# Patient Record
Sex: MALE | Race: WHITE | NOT HISPANIC OR LATINO | Employment: OTHER | URBAN - METROPOLITAN AREA
[De-identification: names, ages, dates, MRNs, and addresses within clinical notes are randomized per-mention and may not be internally consistent; named-entity substitution may affect disease eponyms.]

---

## 2020-10-26 ENCOUNTER — TRANSCRIBE ORDERS (OUTPATIENT)
Dept: ADMINISTRATIVE | Facility: HOSPITAL | Age: 75
End: 2020-10-26

## 2020-10-26 DIAGNOSIS — C44.229 SQUAMOUS CELL CARCINOMA OF SKIN OF LEFT EAR: Primary | ICD-10-CM

## 2020-11-09 ENCOUNTER — HOSPITAL ENCOUNTER (OUTPATIENT)
Dept: PET IMAGING | Facility: HOSPITAL | Age: 75
Discharge: HOME | End: 2020-11-09

## 2020-11-09 DIAGNOSIS — C44.229 SQUAMOUS CELL CARCINOMA OF SKIN OF LEFT EAR: ICD-10-CM

## 2020-11-09 LAB — GLUCOSE BLDC GLUCOMTR-MCNC: 90 MG/DL (ref 70–130)

## 2020-11-09 PROCEDURE — 0 FLUDEOXYGLUCOSE F18 SOLUTION: Performed by: OTOLARYNGOLOGY

## 2020-11-09 PROCEDURE — 78815 PET IMAGE W/CT SKULL-THIGH: CPT

## 2020-11-09 PROCEDURE — 82962 GLUCOSE BLOOD TEST: CPT

## 2020-11-09 PROCEDURE — A9552 F18 FDG: HCPCS | Performed by: OTOLARYNGOLOGY

## 2020-11-09 RX ADMIN — FLUDEOXYGLUCOSE F18 1 DOSE: 300 INJECTION INTRAVENOUS at 14:01

## 2020-11-16 ENCOUNTER — APPOINTMENT (OUTPATIENT)
Dept: PREADMISSION TESTING | Facility: HOSPITAL | Age: 75
End: 2020-11-16

## 2020-11-16 VITALS — WEIGHT: 205.03 LBS | HEIGHT: 67 IN | BODY MASS INDEX: 32.18 KG/M2

## 2020-11-16 LAB
ALBUMIN SERPL-MCNC: 4.3 G/DL (ref 3.5–5.2)
ALBUMIN/GLOB SERPL: 2.3 G/DL
ALP SERPL-CCNC: 68 U/L (ref 39–117)
ALT SERPL W P-5'-P-CCNC: 18 U/L (ref 1–41)
ANION GAP SERPL CALCULATED.3IONS-SCNC: 8 MMOL/L (ref 5–15)
AST SERPL-CCNC: 25 U/L (ref 1–40)
BILIRUB SERPL-MCNC: 0.2 MG/DL (ref 0–1.2)
BUN SERPL-MCNC: 20 MG/DL (ref 8–23)
BUN/CREAT SERPL: 19.4 (ref 7–25)
CALCIUM SPEC-SCNC: 9.5 MG/DL (ref 8.6–10.5)
CHLORIDE SERPL-SCNC: 106 MMOL/L (ref 98–107)
CO2 SERPL-SCNC: 27 MMOL/L (ref 22–29)
CREAT SERPL-MCNC: 1.03 MG/DL (ref 0.76–1.27)
DEPRECATED RDW RBC AUTO: 43.2 FL (ref 37–54)
ERYTHROCYTE [DISTWIDTH] IN BLOOD BY AUTOMATED COUNT: 12.7 % (ref 12.3–15.4)
GFR SERPL CREATININE-BSD FRML MDRD: 70 ML/MIN/1.73
GLOBULIN UR ELPH-MCNC: 1.9 GM/DL
GLUCOSE SERPL-MCNC: 93 MG/DL (ref 65–99)
HCT VFR BLD AUTO: 37.9 % (ref 37.5–51)
HGB BLD-MCNC: 12.6 G/DL (ref 13–17.7)
MCH RBC QN AUTO: 30.7 PG (ref 26.6–33)
MCHC RBC AUTO-ENTMCNC: 33.2 G/DL (ref 31.5–35.7)
MCV RBC AUTO: 92.4 FL (ref 79–97)
PLATELET # BLD AUTO: 179 10*3/MM3 (ref 140–450)
PMV BLD AUTO: 9.1 FL (ref 6–12)
POTASSIUM SERPL-SCNC: 4 MMOL/L (ref 3.5–5.2)
PROT SERPL-MCNC: 6.2 G/DL (ref 6–8.5)
QT INTERVAL: 400 MS
QTC INTERVAL: 409 MS
RBC # BLD AUTO: 4.1 10*6/MM3 (ref 4.14–5.8)
SODIUM SERPL-SCNC: 141 MMOL/L (ref 136–145)
WBC # BLD AUTO: 6.71 10*3/MM3 (ref 3.4–10.8)

## 2020-11-16 PROCEDURE — 85027 COMPLETE CBC AUTOMATED: CPT

## 2020-11-16 PROCEDURE — 36415 COLL VENOUS BLD VENIPUNCTURE: CPT

## 2020-11-16 PROCEDURE — 80053 COMPREHEN METABOLIC PANEL: CPT

## 2020-11-16 PROCEDURE — U0004 COV-19 TEST NON-CDC HGH THRU: HCPCS

## 2020-11-16 PROCEDURE — 93005 ELECTROCARDIOGRAM TRACING: CPT

## 2020-11-16 PROCEDURE — C9803 HOPD COVID-19 SPEC COLLECT: HCPCS

## 2020-11-16 PROCEDURE — 93010 ELECTROCARDIOGRAM REPORT: CPT | Performed by: INTERNAL MEDICINE

## 2020-11-16 RX ORDER — LISINOPRIL 10 MG/1
10 TABLET ORAL DAILY
COMMUNITY

## 2020-11-16 RX ORDER — MULTIPLE VITAMINS W/ MINERALS TAB 9MG-400MCG
1 TAB ORAL DAILY
COMMUNITY

## 2020-11-16 RX ORDER — SIMVASTATIN 40 MG
40 TABLET ORAL NIGHTLY
COMMUNITY

## 2020-11-16 NOTE — PAT

## 2020-11-17 LAB — SARS-COV-2 RNA RESP QL NAA+PROBE: NOT DETECTED

## 2020-11-18 ENCOUNTER — HOSPITAL ENCOUNTER (INPATIENT)
Facility: HOSPITAL | Age: 75
LOS: 2 days | Discharge: HOME | End: 2020-11-20
Attending: OTOLARYNGOLOGY | Admitting: OTOLARYNGOLOGY

## 2020-11-18 ENCOUNTER — ANESTHESIA EVENT (OUTPATIENT)
Dept: PERIOP | Facility: HOSPITAL | Age: 75
End: 2020-11-18

## 2020-11-18 ENCOUNTER — ANESTHESIA (OUTPATIENT)
Dept: PERIOP | Facility: HOSPITAL | Age: 75
End: 2020-11-18

## 2020-11-18 DIAGNOSIS — IMO0002 METASTATIC SQUAMOUS CELL CARCINOMA: ICD-10-CM

## 2020-11-18 PROBLEM — R22.1 NECK MASS: Status: ACTIVE | Noted: 2020-11-18

## 2020-11-18 PROCEDURE — 25010000002 SUCCINYLCHOLINE PER 20 MG: Performed by: NURSE ANESTHETIST, CERTIFIED REGISTERED

## 2020-11-18 PROCEDURE — 25010000002 FENTANYL CITRATE (PF) 100 MCG/2ML SOLUTION: Performed by: NURSE ANESTHETIST, CERTIFIED REGISTERED

## 2020-11-18 PROCEDURE — 00BR0ZZ EXCISION OF ACCESSORY NERVE, OPEN APPROACH: ICD-10-PCS | Performed by: OTOLARYNGOLOGY

## 2020-11-18 PROCEDURE — 25010000002 HYDROMORPHONE PER 4 MG: Performed by: OTOLARYNGOLOGY

## 2020-11-18 PROCEDURE — 25010000002 ONDANSETRON PER 1 MG: Performed by: NURSE ANESTHETIST, CERTIFIED REGISTERED

## 2020-11-18 PROCEDURE — 0KB30ZZ EXCISION OF LEFT NECK MUSCLE, OPEN APPROACH: ICD-10-PCS | Performed by: OTOLARYNGOLOGY

## 2020-11-18 PROCEDURE — 25010000002 PROPOFOL 10 MG/ML EMULSION: Performed by: NURSE ANESTHETIST, CERTIFIED REGISTERED

## 2020-11-18 PROCEDURE — 25010000002 DEXAMETHASONE PER 1 MG: Performed by: NURSE ANESTHETIST, CERTIFIED REGISTERED

## 2020-11-18 PROCEDURE — 0CT90ZZ RESECTION OF LEFT PAROTID GLAND, OPEN APPROACH: ICD-10-PCS | Performed by: OTOLARYNGOLOGY

## 2020-11-18 PROCEDURE — 0HB1XZX EXCISION OF FACE SKIN, EXTERNAL APPROACH, DIAGNOSTIC: ICD-10-PCS | Performed by: OTOLARYNGOLOGY

## 2020-11-18 PROCEDURE — 25010000002 PHENYLEPHRINE PER 1 ML: Performed by: NURSE ANESTHETIST, CERTIFIED REGISTERED

## 2020-11-18 PROCEDURE — 88305 TISSUE EXAM BY PATHOLOGIST: CPT | Performed by: OTOLARYNGOLOGY

## 2020-11-18 PROCEDURE — 07B20ZZ EXCISION OF LEFT NECK LYMPHATIC, OPEN APPROACH: ICD-10-PCS | Performed by: OTOLARYNGOLOGY

## 2020-11-18 PROCEDURE — 88307 TISSUE EXAM BY PATHOLOGIST: CPT | Performed by: OTOLARYNGOLOGY

## 2020-11-18 DEVICE — LIGACLIP MCA MULTIPLE CLIP APPLIERS, 20 SMALL CLIPS
Type: IMPLANTABLE DEVICE | Status: FUNCTIONAL
Brand: LIGACLIP

## 2020-11-18 RX ORDER — LIDOCAINE HYDROCHLORIDE 10 MG/ML
0.5 INJECTION, SOLUTION EPIDURAL; INFILTRATION; INTRACAUDAL; PERINEURAL ONCE AS NEEDED
Status: COMPLETED | OUTPATIENT
Start: 2020-11-18 | End: 2020-11-18

## 2020-11-18 RX ORDER — DEXAMETHASONE SODIUM PHOSPHATE 4 MG/ML
INJECTION, SOLUTION INTRA-ARTICULAR; INTRALESIONAL; INTRAMUSCULAR; INTRAVENOUS; SOFT TISSUE AS NEEDED
Status: DISCONTINUED | OUTPATIENT
Start: 2020-11-18 | End: 2020-11-18 | Stop reason: SURG

## 2020-11-18 RX ORDER — SODIUM CHLORIDE 0.9 % (FLUSH) 0.9 %
10 SYRINGE (ML) INJECTION AS NEEDED
Status: DISCONTINUED | OUTPATIENT
Start: 2020-11-18 | End: 2020-11-18 | Stop reason: HOSPADM

## 2020-11-18 RX ORDER — SODIUM CHLORIDE, SODIUM LACTATE, POTASSIUM CHLORIDE, CALCIUM CHLORIDE 600; 310; 30; 20 MG/100ML; MG/100ML; MG/100ML; MG/100ML
9 INJECTION, SOLUTION INTRAVENOUS CONTINUOUS
Status: DISCONTINUED | OUTPATIENT
Start: 2020-11-18 | End: 2020-11-18

## 2020-11-18 RX ORDER — FAMOTIDINE 10 MG/ML
20 INJECTION, SOLUTION INTRAVENOUS ONCE
Status: CANCELLED | OUTPATIENT
Start: 2020-11-18 | End: 2020-11-18

## 2020-11-18 RX ORDER — MAGNESIUM HYDROXIDE 1200 MG/15ML
LIQUID ORAL AS NEEDED
Status: DISCONTINUED | OUTPATIENT
Start: 2020-11-18 | End: 2020-11-18 | Stop reason: HOSPADM

## 2020-11-18 RX ORDER — HYDROMORPHONE HYDROCHLORIDE 1 MG/ML
0.5 INJECTION, SOLUTION INTRAMUSCULAR; INTRAVENOUS; SUBCUTANEOUS
Status: DISCONTINUED | OUTPATIENT
Start: 2020-11-18 | End: 2020-11-18 | Stop reason: HOSPADM

## 2020-11-18 RX ORDER — ONDANSETRON 2 MG/ML
4 INJECTION INTRAMUSCULAR; INTRAVENOUS ONCE AS NEEDED
Status: DISCONTINUED | OUTPATIENT
Start: 2020-11-18 | End: 2020-11-18 | Stop reason: HOSPADM

## 2020-11-18 RX ORDER — LIDOCAINE HYDROCHLORIDE 10 MG/ML
INJECTION, SOLUTION EPIDURAL; INFILTRATION; INTRACAUDAL; PERINEURAL AS NEEDED
Status: DISCONTINUED | OUTPATIENT
Start: 2020-11-18 | End: 2020-11-18 | Stop reason: SURG

## 2020-11-18 RX ORDER — SODIUM CHLORIDE 0.9 % (FLUSH) 0.9 %
10 SYRINGE (ML) INJECTION AS NEEDED
Status: DISCONTINUED | OUTPATIENT
Start: 2020-11-18 | End: 2020-11-20 | Stop reason: HOSPADM

## 2020-11-18 RX ORDER — CLINDAMYCIN PHOSPHATE 900 MG/50ML
900 INJECTION INTRAVENOUS ONCE
Status: COMPLETED | OUTPATIENT
Start: 2020-11-18 | End: 2020-11-18

## 2020-11-18 RX ORDER — DIAPER,BRIEF,INFANT-TODD,DISP
EACH MISCELLANEOUS 3 TIMES DAILY
Status: DISCONTINUED | OUTPATIENT
Start: 2020-11-18 | End: 2020-11-20 | Stop reason: HOSPADM

## 2020-11-18 RX ORDER — IBUPROFEN 400 MG/1
400 TABLET ORAL EVERY 4 HOURS PRN
Status: DISCONTINUED | OUTPATIENT
Start: 2020-11-18 | End: 2020-11-20 | Stop reason: HOSPADM

## 2020-11-18 RX ORDER — HYDROMORPHONE HYDROCHLORIDE 1 MG/ML
0.5 INJECTION, SOLUTION INTRAMUSCULAR; INTRAVENOUS; SUBCUTANEOUS
Status: DISCONTINUED | OUTPATIENT
Start: 2020-11-18 | End: 2020-11-20 | Stop reason: HOSPADM

## 2020-11-18 RX ORDER — FAMOTIDINE 20 MG/1
20 TABLET, FILM COATED ORAL ONCE
Status: COMPLETED | OUTPATIENT
Start: 2020-11-18 | End: 2020-11-18

## 2020-11-18 RX ORDER — HYDRALAZINE HYDROCHLORIDE 20 MG/ML
5 INJECTION INTRAMUSCULAR; INTRAVENOUS
Status: DISCONTINUED | OUTPATIENT
Start: 2020-11-18 | End: 2020-11-18 | Stop reason: HOSPADM

## 2020-11-18 RX ORDER — HYDROCODONE BITARTRATE AND ACETAMINOPHEN 7.5; 325 MG/1; MG/1
1 TABLET ORAL EVERY 4 HOURS PRN
Status: DISCONTINUED | OUTPATIENT
Start: 2020-11-18 | End: 2020-11-20 | Stop reason: HOSPADM

## 2020-11-18 RX ORDER — SODIUM CHLORIDE 0.9 % (FLUSH) 0.9 %
3 SYRINGE (ML) INJECTION EVERY 12 HOURS SCHEDULED
Status: DISCONTINUED | OUTPATIENT
Start: 2020-11-18 | End: 2020-11-20 | Stop reason: HOSPADM

## 2020-11-18 RX ORDER — ONDANSETRON 2 MG/ML
4 INJECTION INTRAMUSCULAR; INTRAVENOUS ONCE AS NEEDED
Status: DISCONTINUED | OUTPATIENT
Start: 2020-11-18 | End: 2020-11-20 | Stop reason: HOSPADM

## 2020-11-18 RX ORDER — FENTANYL CITRATE 50 UG/ML
50 INJECTION, SOLUTION INTRAMUSCULAR; INTRAVENOUS
Status: DISCONTINUED | OUTPATIENT
Start: 2020-11-18 | End: 2020-11-18 | Stop reason: HOSPADM

## 2020-11-18 RX ORDER — NALOXONE HCL 0.4 MG/ML
0.4 VIAL (ML) INJECTION
Status: DISCONTINUED | OUTPATIENT
Start: 2020-11-18 | End: 2020-11-20 | Stop reason: HOSPADM

## 2020-11-18 RX ORDER — HYDROCODONE BITARTRATE AND ACETAMINOPHEN 10; 325 MG/1; MG/1
1 TABLET ORAL EVERY 4 HOURS PRN
Status: DISCONTINUED | OUTPATIENT
Start: 2020-11-18 | End: 2020-11-20 | Stop reason: HOSPADM

## 2020-11-18 RX ORDER — NALOXONE HCL 0.4 MG/ML
0.1 VIAL (ML) INJECTION
Status: DISCONTINUED | OUTPATIENT
Start: 2020-11-18 | End: 2020-11-20 | Stop reason: HOSPADM

## 2020-11-18 RX ORDER — LABETALOL HYDROCHLORIDE 5 MG/ML
5 INJECTION, SOLUTION INTRAVENOUS
Status: DISCONTINUED | OUTPATIENT
Start: 2020-11-18 | End: 2020-11-18 | Stop reason: HOSPADM

## 2020-11-18 RX ORDER — SUCCINYLCHOLINE CHLORIDE 20 MG/ML
INJECTION INTRAMUSCULAR; INTRAVENOUS AS NEEDED
Status: DISCONTINUED | OUTPATIENT
Start: 2020-11-18 | End: 2020-11-18 | Stop reason: SURG

## 2020-11-18 RX ORDER — ROCURONIUM BROMIDE 10 MG/ML
INJECTION, SOLUTION INTRAVENOUS AS NEEDED
Status: DISCONTINUED | OUTPATIENT
Start: 2020-11-18 | End: 2020-11-18 | Stop reason: SURG

## 2020-11-18 RX ORDER — IPRATROPIUM BROMIDE AND ALBUTEROL SULFATE 2.5; .5 MG/3ML; MG/3ML
3 SOLUTION RESPIRATORY (INHALATION) ONCE AS NEEDED
Status: DISCONTINUED | OUTPATIENT
Start: 2020-11-18 | End: 2020-11-18 | Stop reason: HOSPADM

## 2020-11-18 RX ORDER — SODIUM CHLORIDE 0.9 % (FLUSH) 0.9 %
10 SYRINGE (ML) INJECTION EVERY 12 HOURS SCHEDULED
Status: DISCONTINUED | OUTPATIENT
Start: 2020-11-18 | End: 2020-11-18 | Stop reason: HOSPADM

## 2020-11-18 RX ORDER — FENTANYL CITRATE 50 UG/ML
INJECTION, SOLUTION INTRAMUSCULAR; INTRAVENOUS AS NEEDED
Status: DISCONTINUED | OUTPATIENT
Start: 2020-11-18 | End: 2020-11-18 | Stop reason: SURG

## 2020-11-18 RX ORDER — PROPOFOL 10 MG/ML
VIAL (ML) INTRAVENOUS AS NEEDED
Status: DISCONTINUED | OUTPATIENT
Start: 2020-11-18 | End: 2020-11-18 | Stop reason: SURG

## 2020-11-18 RX ORDER — DIPHENHYDRAMINE HCL 25 MG
25 CAPSULE ORAL EVERY 6 HOURS PRN
Status: DISCONTINUED | OUTPATIENT
Start: 2020-11-18 | End: 2020-11-20 | Stop reason: HOSPADM

## 2020-11-18 RX ORDER — ASPIRIN 81 MG/1
81 TABLET ORAL DAILY
COMMUNITY

## 2020-11-18 RX ORDER — MORPHINE SULFATE 4 MG/ML
4 INJECTION, SOLUTION INTRAMUSCULAR; INTRAVENOUS
Status: DISCONTINUED | OUTPATIENT
Start: 2020-11-18 | End: 2020-11-20 | Stop reason: HOSPADM

## 2020-11-18 RX ORDER — LIDOCAINE HYDROCHLORIDE AND EPINEPHRINE 10; 10 MG/ML; UG/ML
INJECTION, SOLUTION INFILTRATION; PERINEURAL AS NEEDED
Status: DISCONTINUED | OUTPATIENT
Start: 2020-11-18 | End: 2020-11-18 | Stop reason: HOSPADM

## 2020-11-18 RX ORDER — SODIUM CHLORIDE, SODIUM LACTATE, POTASSIUM CHLORIDE, CALCIUM CHLORIDE 600; 310; 30; 20 MG/100ML; MG/100ML; MG/100ML; MG/100ML
100 INJECTION, SOLUTION INTRAVENOUS CONTINUOUS
Status: DISCONTINUED | OUTPATIENT
Start: 2020-11-18 | End: 2020-11-19

## 2020-11-18 RX ORDER — ONDANSETRON 2 MG/ML
INJECTION INTRAMUSCULAR; INTRAVENOUS AS NEEDED
Status: DISCONTINUED | OUTPATIENT
Start: 2020-11-18 | End: 2020-11-18 | Stop reason: SURG

## 2020-11-18 RX ADMIN — EPHEDRINE SULFATE 10 MG: 50 INJECTION INTRAMUSCULAR; INTRAVENOUS; SUBCUTANEOUS at 12:36

## 2020-11-18 RX ADMIN — FENTANYL CITRATE 50 MCG: 0.05 INJECTION, SOLUTION INTRAMUSCULAR; INTRAVENOUS at 19:17

## 2020-11-18 RX ADMIN — BACITRACIN: 500 OINTMENT TOPICAL at 20:44

## 2020-11-18 RX ADMIN — CLINDAMYCIN PHOSPHATE 900 MG: 18 INJECTION, SOLUTION INTRAVENOUS at 17:45

## 2020-11-18 RX ADMIN — FENTANYL CITRATE 50 MCG: 50 INJECTION, SOLUTION INTRAMUSCULAR; INTRAVENOUS at 13:20

## 2020-11-18 RX ADMIN — EPHEDRINE SULFATE 5 MG: 50 INJECTION INTRAMUSCULAR; INTRAVENOUS; SUBCUTANEOUS at 12:38

## 2020-11-18 RX ADMIN — SODIUM CHLORIDE, PRESERVATIVE FREE 3 ML: 5 INJECTION INTRAVENOUS at 20:34

## 2020-11-18 RX ADMIN — LIDOCAINE HYDROCHLORIDE 0.2 ML: 10 INJECTION, SOLUTION EPIDURAL; INFILTRATION; INTRACAUDAL; PERINEURAL at 10:24

## 2020-11-18 RX ADMIN — ROCURONIUM BROMIDE 10 MG: 10 INJECTION INTRAVENOUS at 12:26

## 2020-11-18 RX ADMIN — EPHEDRINE SULFATE 10 MG: 50 INJECTION INTRAMUSCULAR; INTRAVENOUS; SUBCUTANEOUS at 12:34

## 2020-11-18 RX ADMIN — LIDOCAINE HYDROCHLORIDE 50 MG: 10 INJECTION, SOLUTION EPIDURAL; INFILTRATION; INTRACAUDAL; PERINEURAL at 12:26

## 2020-11-18 RX ADMIN — PROPOFOL 200 MG: 10 INJECTION, EMULSION INTRAVENOUS at 12:26

## 2020-11-18 RX ADMIN — DEXAMETHASONE SODIUM PHOSPHATE 8 MG: 4 INJECTION, SOLUTION INTRAMUSCULAR; INTRAVENOUS at 12:33

## 2020-11-18 RX ADMIN — SODIUM CHLORIDE, POTASSIUM CHLORIDE, SODIUM LACTATE AND CALCIUM CHLORIDE: 600; 310; 30; 20 INJECTION, SOLUTION INTRAVENOUS at 15:14

## 2020-11-18 RX ADMIN — HYDROMORPHONE HYDROCHLORIDE 0.5 MG: 1 INJECTION, SOLUTION INTRAMUSCULAR; INTRAVENOUS; SUBCUTANEOUS at 20:44

## 2020-11-18 RX ADMIN — FAMOTIDINE 20 MG: 20 TABLET, FILM COATED ORAL at 10:24

## 2020-11-18 RX ADMIN — SODIUM CHLORIDE, POTASSIUM CHLORIDE, SODIUM LACTATE AND CALCIUM CHLORIDE 9 ML/HR: 600; 310; 30; 20 INJECTION, SOLUTION INTRAVENOUS at 10:24

## 2020-11-18 RX ADMIN — FENTANYL CITRATE 50 MCG: 50 INJECTION, SOLUTION INTRAMUSCULAR; INTRAVENOUS at 12:26

## 2020-11-18 RX ADMIN — SODIUM CHLORIDE, POTASSIUM CHLORIDE, SODIUM LACTATE AND CALCIUM CHLORIDE 100 ML/HR: 600; 310; 30; 20 INJECTION, SOLUTION INTRAVENOUS at 20:43

## 2020-11-18 RX ADMIN — SUCCINYLCHOLINE CHLORIDE 100 MG: 20 INJECTION, SOLUTION INTRAMUSCULAR; INTRAVENOUS; PARENTERAL at 12:26

## 2020-11-18 RX ADMIN — CLINDAMYCIN PHOSPHATE 900 MG: 18 INJECTION, SOLUTION INTRAVENOUS at 12:20

## 2020-11-18 RX ADMIN — ONDANSETRON 4 MG: 2 INJECTION INTRAMUSCULAR; INTRAVENOUS at 18:46

## 2020-11-18 RX ADMIN — PHENYLEPHRINE HYDROCHLORIDE 1 MCG/KG/MIN: 10 INJECTION INTRAVENOUS at 12:43

## 2020-11-18 NOTE — ANESTHESIA PROCEDURE NOTES
Airway  Urgency: elective    Date/Time: 11/18/2020 12:27 PM  Airway not difficult    General Information and Staff    Patient location during procedure: OR  CRNA: Ericka Sigala CRNA    Indications and Patient Condition  Indications for airway management: airway protection    Preoxygenated: yes  MILS not maintained throughout  Mask difficulty assessment: 2 - vent by mask + OA or adjuvant +/- NMBA    Final Airway Details  Final airway type: endotracheal airway      Successful airway: ETT and NIM tube  Cuffed: yes   Successful intubation technique: direct laryngoscopy  Facilitating devices/methods: intubating stylet  Endotracheal tube insertion site: oral  Blade: Ivan  Blade size: 4  ETT size (mm): 7.0  Cormack-Lehane Classification: grade I - full view of glottis  Placement verified by: chest auscultation and capnometry   Measured from: lips  ETT/EBT  to lips (cm): 20  Number of attempts at approach: 1  Assessment: lips, teeth, and gum same as pre-op and atraumatic intubation    Additional Comments  Negative epigastric sounds, Breath sound equal bilaterally with symmetric chest rise and fall

## 2020-11-18 NOTE — INTERVAL H&P NOTE
"[facility] Pre-op    Full history and physical note from office is up to date.  See office note attached.    Pulse 61   Temp 98.6 °F (37 °C) (Temporal)   Resp 16   Ht 170.2 cm (67\")   Wt 93 kg (205 lb)   SpO2 99%   BMI 32.11 kg/m²       LAB Results:  Lab Results   Component Value Date    WBC 6.71 11/16/2020    HGB 12.6 (L) 11/16/2020    HCT 37.9 11/16/2020    MCV 92.4 11/16/2020     11/16/2020    GLUCOSE 93 11/16/2020    BUN 20 11/16/2020    CREATININE 1.03 11/16/2020    EGFRIFNONA 70 11/16/2020     11/16/2020    K 4.0 11/16/2020     11/16/2020    CO2 27.0 11/16/2020    CALCIUM 9.5 11/16/2020    ALBUMIN 4.30 11/16/2020    AST 25 11/16/2020    ALT 18 11/16/2020    BILITOT 0.2 11/16/2020     *I have reviewed the patient's recent clinical results.    11/16/20 COVID-19: negative    Cancer Staging (if applicable)  Cancer Patient: _X_ yes __no __unknown__N/A; If yes, clinical stage T:__ N:__M:__, stage group or __N/A      Virgie Almonte, APRN 11/18/2020 10:56 EST  "

## 2020-11-18 NOTE — ANESTHESIA PREPROCEDURE EVALUATION
Anesthesia Evaluation     Patient summary reviewed and Nursing notes reviewed   no history of anesthetic complications:  NPO Solid Status: > 8 hours  NPO Liquid Status: > 8 hours           Airway   Mallampati: II  TM distance: >3 FB  Neck ROM: full  No difficulty expected  Dental      Pulmonary - negative pulmonary ROS and normal exam   Cardiovascular - normal exam    (+) hypertension, hyperlipidemia,       Neuro/Psych- negative ROS  GI/Hepatic/Renal/Endo      Musculoskeletal     Abdominal    Substance History      OB/GYN          Other   arthritis,                      Anesthesia Plan    ASA 3     general     intravenous induction     Anesthetic plan, all risks, benefits, and alternatives have been provided, discussed and informed consent has been obtained with: patient.    Plan discussed with CRNA.

## 2020-11-19 PROCEDURE — 97165 OT EVAL LOW COMPLEX 30 MIN: CPT

## 2020-11-19 RX ADMIN — BACITRACIN: 500 OINTMENT TOPICAL at 15:11

## 2020-11-19 RX ADMIN — HYDROCODONE BITARTRATE AND ACETAMINOPHEN 1 TABLET: 7.5; 325 TABLET ORAL at 18:32

## 2020-11-19 RX ADMIN — HYDROCODONE BITARTRATE AND ACETAMINOPHEN 1 TABLET: 7.5; 325 TABLET ORAL at 03:52

## 2020-11-19 RX ADMIN — BACITRACIN 1 APPLICATION: 500 OINTMENT TOPICAL at 20:35

## 2020-11-19 RX ADMIN — BACITRACIN: 500 OINTMENT TOPICAL at 08:54

## 2020-11-19 RX ADMIN — HYDROCODONE BITARTRATE AND ACETAMINOPHEN 1 TABLET: 7.5; 325 TABLET ORAL at 11:13

## 2020-11-19 RX ADMIN — SODIUM CHLORIDE, PRESERVATIVE FREE 3 ML: 5 INJECTION INTRAVENOUS at 08:54

## 2020-11-19 NOTE — PLAN OF CARE
Problem: Adult Inpatient Plan of Care  Goal: Plan of Care Review  Outcome: Ongoing, Progressing  Goal: Patient-Specific Goal (Individualized)  Outcome: Ongoing, Progressing  Goal: Absence of Hospital-Acquired Illness or Injury  Outcome: Ongoing, Progressing  Intervention: Identify and Manage Fall Risk  Recent Flowsheet Documentation  Taken 11/19/2020 1200 by Oliva Encarnacion RN  Safety Promotion/Fall Prevention:   activity supervised   fall prevention program maintained   nonskid shoes/slippers when out of bed   room organization consistent   safety round/check completed  Taken 11/19/2020 1000 by Oliva Encarnacion RN  Safety Promotion/Fall Prevention:   activity supervised   fall prevention program maintained   nonskid shoes/slippers when out of bed   room organization consistent   safety round/check completed  Taken 11/19/2020 0800 by Oliva Encarnacion RN  Safety Promotion/Fall Prevention:   activity supervised   fall prevention program maintained   nonskid shoes/slippers when out of bed   room organization consistent   safety round/check completed  Intervention: Prevent and Manage VTE (venous thromboembolism) Risk  Recent Flowsheet Documentation  Taken 11/19/2020 1200 by Oliva Encarnacion RN  VTE Prevention/Management:   bilateral   sequential compression devices off  Taken 11/19/2020 1000 by Oliva Encarnacion RN  VTE Prevention/Management: (ambulating)   bilateral   sequential compression devices off   other (see comments)  Taken 11/19/2020 0800 by Oliva Encarnacion RN  VTE Prevention/Management:   bilateral   sequential compression devices off  Goal: Optimal Comfort and Wellbeing  Outcome: Ongoing, Progressing  Intervention: Provide Person-Centered Care  Recent Flowsheet Documentation  Taken 11/19/2020 0800 by Oliva Encarnacion RN  Trust Relationship/Rapport:   care explained   choices provided   emotional support provided   empathic listening provided   questions answered   questions encouraged   reassurance  provided   thoughts/feelings acknowledged  Goal: Readiness for Transition of Care  Outcome: Ongoing, Progressing     Problem: Skin Injury Risk Increased  Goal: Skin Health and Integrity  Outcome: Ongoing, Progressing  Intervention: Optimize Skin Protection  Recent Flowsheet Documentation  Taken 11/19/2020 1200 by Oliva Encarnacion RN  Pressure Reduction Techniques: frequent weight shift encouraged  Pressure Reduction Devices: pressure-redistributing mattress utilized  Skin Protection: skin sealant/moisture barrier applied  Taken 11/19/2020 1000 by Oliva Encarnacion, RN  Pressure Reduction Techniques: frequent weight shift encouraged  Pressure Reduction Devices: pressure-redistributing mattress utilized  Skin Protection: skin sealant/moisture barrier applied  Taken 11/19/2020 0800 by Oliva Encarnacion, RN  Pressure Reduction Techniques: frequent weight shift encouraged  Pressure Reduction Devices: pressure-redistributing mattress utilized  Skin Protection:   skin sealant/moisture barrier applied   adhesive use limited   incontinence pads utilized   tubing/devices free from skin contact     Problem: Pain Acute  Goal: Optimal Pain Control  Outcome: Ongoing, Progressing  Intervention: Develop Pain Management Plan  Recent Flowsheet Documentation  Taken 11/19/2020 1113 by Oliva Encarnacion, RN  Pain Management Interventions:   see MAR   quiet environment facilitated     Problem: Bleeding (Surgery Nonspecified)  Goal: Absence of Bleeding  Outcome: Ongoing, Progressing     Problem: Bowel Elimination Impaired (Surgery Nonspecified)  Goal: Effective Bowel Elimination  Outcome: Ongoing, Progressing     Problem: Infection (Surgery Nonspecified)  Goal: Absence of Infection Signs and Symptoms  Outcome: Ongoing, Progressing     Problem: Ongoing Anesthesia Effects (Surgery Nonspecified)  Goal: Anesthesia/Sedation Recovery  Outcome: Ongoing, Progressing  Intervention: Optimize Anesthesia Recovery  Recent Flowsheet Documentation  Taken  11/19/2020 1200 by Oliva Encarnacion, RN  Safety Promotion/Fall Prevention:   activity supervised   fall prevention program maintained   nonskid shoes/slippers when out of bed   room organization consistent   safety round/check completed  Taken 11/19/2020 1000 by Oliva Encarnacion, RN  Safety Promotion/Fall Prevention:   activity supervised   fall prevention program maintained   nonskid shoes/slippers when out of bed   room organization consistent   safety round/check completed  Taken 11/19/2020 0800 by Oliva Encarnacion, RN  Safety Promotion/Fall Prevention:   activity supervised   fall prevention program maintained   nonskid shoes/slippers when out of bed   room organization consistent   safety round/check completed     Problem: Pain (Surgery Nonspecified)  Goal: Acceptable Pain Control  Outcome: Ongoing, Progressing  Intervention: Prevent or Manage Pain  Recent Flowsheet Documentation  Taken 11/19/2020 1113 by Oliva Encarnacion, RN  Pain Management Interventions:   see MAR   quiet environment facilitated     Problem: Postoperative Nausea and Vomiting (Surgery Nonspecified)  Goal: Nausea and Vomiting Relief  Outcome: Ongoing, Progressing     Problem: Postoperative Urinary Retention (Surgery Nonspecified)  Goal: Effective Urinary Elimination  Outcome: Ongoing, Progressing   Goal Outcome Evaluation:  Plan of Care Reviewed With: patient  Progress: no change

## 2020-11-19 NOTE — BRIEF OP NOTE
NECK DISSECTION, PAROTIDECTOMY  Progress Note    Ajith Gallego  11/18/2020    Pre-op Diagnosis:   SCCa left neck and parotid  Left facial skin lesion       Post-Op Diagnosis Codes:  same    Procedure/CPT® Codes:        Procedure(s):  LEFT MODIFIED RADICAL NECK DISSECTION  LEFT PAROTIDECTOMY WITH FACIAL NERVE DISSECTION  EXCISION LEFT FACIAL SKIN LESION 1 CM    Surgeon(s):  Adams Patten MD Alexander, Keith J, MD    Anesthesia: General    Staff:   Circulator: Yulisa Costello RN; Yordan Wall RN; Tonya Valdivia RN; Lali Pascual RN  Scrub Person: Celeste Ruiz; Herber Frederick; Kip Shah RN         Estimated Blood Loss: 100 cc    Urine Voided: 300 mL    Specimens:                Specimens     ID Source Type Tests Collected By Collected At Frozen?      A Face Tissue · TISSUE PATHOLOGY EXAM   Adams Patten MD 11/18/20 1322 No     Description: LEFT FACIAL SKIN LESION, CLIP IS  AT 1200    B Neck Tissue · TISSUE PATHOLOGY EXAM   Adams Patten MD 11/18/20 1622 No     Description: left neck dissection level 2-4    C Parotid Tissue · TISSUE PATHOLOGY EXAM   Adams Patten MD 11/18/20 1815      Description: Left superior parotidectomy for permanent    D Head Tissue · TISSUE PATHOLOGY EXAM   Adams Patten MD 11/18/20 1830      Description: Skull base for permanent                Drains:   Closed/Suction Drain 1 Left Neck Bulb 19 Fr. (Active)   Drainage Appearance Bright red;Bloody 11/18/20 1915       [REMOVED] Urethral Catheter Silicone 16 Fr. (Removed)       [REMOVED] Urethral Catheter Latex 14 Fr. (Removed)       Findings: large left neck mass invading CN XI and SCM   Abnormal appearing left facial skin lesion excised   Firm mass in inferior aspect of left parotid       Complications: none           Adams Patten MD     Date: 11/18/2020  Time: 19:18 EST

## 2020-11-19 NOTE — PLAN OF CARE
Problem: Adult Inpatient Plan of Care  Goal: Plan of Care Review  Recent Flowsheet Documentation  Taken 11/19/2020 0750 by Tatiana Hightower OT  Plan of Care Reviewed With: patient  Outcome Summary: OT eval complete.  Pt reports 0/10 neck pain and tolerated neck and UE ROM.  Pt educated in neck and shld ROM HEP to carryover, and pt able to give return demo. Pt was independent with bed mobility and ambulating 200ft in hallway without AD. Pt independent to don/doff socks, and Ind with toileting tasks. Pt does not demo any deficits which require OT intervention at this time.  Recommend home upon d/c.

## 2020-11-19 NOTE — ANESTHESIA POSTPROCEDURE EVALUATION
Patient: Ajith Gallego    Procedure Summary     Date: 11/18/20 Room / Location:  [facility] /  [facility]    Anesthesia Start: 1220 Anesthesia Stop:     Procedures:       LEFT MODIFIED RADICAL NECK DISSECTION (Left Neck)      LEFT PAROTIDECTOMY WITH FACIAL NERVE DISSECTION (Left Neck) Diagnosis:     Surgeon: Adams Patten MD Provider: Jan Holt Jr., MD    Anesthesia Type: general ASA Status: 3          Anesthesia Type: general    Vitals  No vitals data found for the desired time range.          Post Anesthesia Care and Evaluation    Patient location during evaluation: PACU  Patient participation: complete - patient participated  Level of consciousness: awake and alert  Pain management: adequate  Airway patency: patent  Anesthetic complications: No anesthetic complications  PONV Status: none  Cardiovascular status: hemodynamically stable and acceptable  Respiratory status: nonlabored ventilation, acceptable and nasal cannula  Hydration status: acceptable

## 2020-11-19 NOTE — PROGRESS NOTES
ENT Progress Note    Patient seen and examined around 0615 this AM - note reflects that encounter    Subjective   NAONE   Pain controlled   Drinking water  Has ambulated   Voiding without issue     Review of Systems:    All systems were reviewed and negative.     Objective     Vital Signs  Temp:  [97.6 °F (36.4 °C)-99.1 °F (37.3 °C)] 97.8 °F (36.6 °C)  Heart Rate:  [60-81] 69  Resp:  [16-18] 18  BP: ()/(38-68) 129/63    Physical Exam:  General Appearance:    Alert, cooperative, in no acute distress, no audible stridor   Head:    Normocephalic   Eyes:          conjunctivae and sclerae normal, corneas clear, PERRLA       Oral Exam:   No oral lesions   Neck:   Soft, incision CDI, no significant swelling, no hematoma or seroma, drain with SS output        Lungs:     No inc WOB   MSK: Expected left shoulder weakness, ROM relatively well maintained             Results Review:    Results from last 7 days   Lab Units 11/16/20  1503   WBC 10*3/mm3 6.71   HEMOGLOBIN g/dL 12.6*   HEMATOCRIT % 37.9   PLATELETS 10*3/mm3 179     Results from last 7 days   Lab Units 11/16/20  1503   SODIUM mmol/L 141   POTASSIUM mmol/L 4.0   CHLORIDE mmol/L 106   CO2 mmol/L 27.0   BUN mg/dL 20   CREATININE mg/dL 1.03   GLUCOSE mg/dL 93   CALCIUM mg/dL 9.5     Imaging Results (Last 24 Hours)     ** No results found for the last 24 hours. **          Assessment/Plan   POD1 left modified radical neck dissection, left superficial parotidectomy    -oob/a/IS  -OT for shoulder excises - CNXI, SCM taken during case  -encouraged PO   -cont to monitor drain output   -HLIV   -norco for pain control     Adams Patten MD  11/19/20  18:32 EST

## 2020-11-19 NOTE — THERAPY DISCHARGE NOTE
Acute Care - Occupational Therapy Discharge  [facility]    Patient Name: Ajith Gallego  : 1945    MRN: [id number]                              Today's Date: 2020       Admit Date: 2020    Visit Dx:     ICD-10-CM ICD-9-CM   1. Metastatic squamous cell carcinoma (CMS/HCC)  C79.9 199.1     Patient Active Problem List   Diagnosis   • Neck mass   • Metastatic squamous cell carcinoma (CMS/HCC)     Past Medical History:   Diagnosis Date   • Arthritis    • Cancer of neck (CMS/HCC)    • Elevated cholesterol    • Hypertension    • Pacemaker    • Prostate cancer (CMS/HCC)    • Tattoo    • Wears glasses      Past Surgical History:   Procedure Laterality Date   • NECK DISSECTION Left 2020    Procedure: MODIFIED RADICAL NECK DISSECTION LEFT;  Surgeon: Adams Patten MD;  Location: [facility];  Service: ENT;  Laterality: Left;   • PACEMAKER IMPLANTATION     • PAROTIDECTOMY Left 2020    Procedure: PAROTIDECTOMY WITH FACIAL NERVE DISSECTION LEFT;  Surgeon: Adams Patten MD;  Location: [facility];  Service: ENT;  Laterality: Left;   • PROSTATECTOMY       General Information     Row Name 20 0750          OT Time and Intention    Document Type  discharge evaluation/summary  -     Mode of Treatment  occupational therapy  -     Row Name 20 0750          General Information    Patient Profile Reviewed  yes  -AC     Prior Level of Function  independent:;ADL's;all household mobility;community mobility;driving  -     Existing Precautions/Restrictions  -- KATHERINE drain L neck; S/P  L neck dissection  -     Row Name 20 0750          Living Environment    Lives With  spouse  -     Row Name 20 0750          Home Main Entrance    Number of Stairs, Main Entrance  three  -AC     Stair Railings, Main Entrance  railings on both sides of stairs  -     Row Name 20 0750          Cognition    Orientation Status (Cognition)  oriented x 4  -AC       User Key  (r) = Recorded  By, (t) = Taken By, (c) = Cosigned By    Initials Name Provider Type    Tatiana Dalal, OT Occupational Therapist        Mobility/ADL's     Row Name 11/19/20 0750          Bed Mobility    Bed Mobility  supine-sit  -     Supine-Sit [place] (Bed Mobility)  independent  -     Row Name 11/19/20 0750          Transfers    Transfers  sit-stand transfer;toilet transfer  -     Sit-Stand [place] (Transfers)  independent  -     [place] Level (Toilet Transfer)  independent  -     Assistive Device (Toilet Transfer)  grab bars/safety frame  -     Row Name 11/19/20 0750          Sit-Stand Transfer    Assistive Device (Sit-Stand Transfers)  -- gt belt  -     Row Name 11/19/20 0750          Functional Mobility    Functional Mobility- Ind. Level  independent  -     Functional Mobility-Distance (Feet)  200  -       User Key  (r) = Recorded By, (t) = Taken By, (c) = Cosigned By    Initials Name Provider Type    Tatiana Dalal, OT Occupational Therapist        Obj/Interventions     Row Name 11/19/20 0838          Sensory Assessment (Somatosensory)    Sensory Assessment (Somatosensory)  UE sensation intact;bilateral UE  -     Row Name 11/19/20 0838          Vision Assessment/Intervention    Visual Impairment/Limitations  corrective lenses full-time  -     Row Name 11/19/20 0838          Range of Motion Comprehensive    General Range of Motion  bilateral upper extremity ROM WNL  -     Row Name 11/19/20 0838          Strength Comprehensive (MMT)    General Manual Muscle Testing (MMT) Assessment  no strength deficits identified  -     Row Name 11/19/20 0838          Therapeutic Exercise    Therapeutic Exercise  -- educated in neck ROM and shld f/e, ab/ad HEP, and pt gave return demo  -       User Key  (r) = Recorded By, (t) = Taken By, (c) = Cosigned By    Initials Name Provider Type    Tatiana Dalal, OT Occupational Therapist        Goals/Plan    No documentation.       Clinical  Impression     Row Name 11/19/20 [facility]          Pain Assessment    Additional Documentation  Pain Scale: Numbers Pre/Post-Treatment (Group)  -[facility] Name 11/19/20 [facility]          Pain Scale: Numbers Pre/Post-Treatment    Pretreatment Pain Rating  0/10 - no pain  -AC     Posttreatment Pain Rating  0/10 - no pain  -     Row Name 11/19/20 [facility]          Plan of Care Review    Plan of Care Reviewed With  patient  -AC     Outcome Summary  OT eval complete.  Pt reports 0/10 neck pain and tolerated neck and UE ROM.  Pt educated in neck and shld ROM HEP to carryover, and pt able to give return demo. Pt was independent with bed mobility and ambulating 200ft in hallway without AD. Pt independent to don/doff socks, and Ind with toileting tasks. Pt does not demo any deficits which require OT intervention at this time.  Recommend home upon d/c.  -[facility] Name 11/19/20 [facility]          Therapy Assessment/Plan (OT)    Therapy Frequency (OT)  evaluation only  -AC     Row Name 11/19/20 [facility]          Therapy Plan Review/Discharge Plan (OT)    Anticipated Discharge Disposition (OT)  home  -[facility] 11/19/20 [facility]          Vital Signs    Pre Systolic BP Rehab  132  -AC     Pre Treatment Diastolic BP  59  -AC     Post Systolic BP Rehab  151  -AC     Post Treatment Diastolic BP  62  -AC     Pretreatment Heart Rate (beats/min)  61  -AC     Posttreatment Heart Rate (beats/min)  66  -AC     Pre SpO2 (%)  97  -AC     O2 Delivery Pre Treatment  room air  -AC     Post SpO2 (%)  98  -AC     O2 Delivery Post Treatment  room air  -AC     Pre Patient Position  Supine  -AC     Intra Patient Position  Standing  -AC     Post Patient Position  Sitting  -[facility] 11/19/20 [facility]          Positioning and Restraints    Pre-Treatment Position  in bed  -AC     Post Treatment Position  chair  -AC       User Key  (r) = Recorded By, (t) = Taken By, (c) = Cosigned By    Initials Name Provider Type    Tatiana Dalal, OT Occupational Therapist         Outcome Measures     Row Name 11/19/20 0750          How much help from another is currently needed...    Putting on and taking off regular lower body clothing?  4  -AC     Bathing (including washing, rinsing, and drying)  4  -AC     Toileting (which includes using toilet bed pan or urinal)  4  -AC     Putting on and taking off regular upper body clothing  4  -AC     Taking care of personal grooming (such as brushing teeth)  4  -AC     Eating meals  4  -AC     AM-PAC 6 Clicks Score (OT)  24  -AC     Row Name 11/19/20 0750          Functional Assessment    Outcome Measure Options  AM-PAC 6 Clicks Daily Activity (OT)  -       User Key  (r) = Recorded By, (t) = Taken By, (c) = Cosigned By    Initials Name Provider Type    Tatiana Dalal OT Occupational Therapist        Occupational Therapy Education                 Title: PT OT SLP Therapies (In Progress)     Topic: Occupational Therapy (In Progress)     Point: ADL training (Done)     Description:   Instruct learner(s) on proper safety adaptation and remediation techniques during self care or transfers.   Instruct in proper use of assistive devices.              Learning Progress Summary           Patient Acceptance, E,TB,D, VU by  at 11/19/2020 0750    Comment: benefits of activity,  neck and shld ROM HEP                   Point: Home exercise program (Not Started)     Description:   Instruct learner(s) on appropriate technique for monitoring, assisting and/or progressing therapeutic exercises/activities.              Learner Progress:  Not documented in this visit.          Point: Precautions (Not Started)     Description:   Instruct learner(s) on prescribed precautions during self-care and functional transfers.              Learner Progress:  Not documented in this visit.          Point: Body mechanics (Not Started)     Description:   Instruct learner(s) on proper positioning and spine alignment during self-care, functional mobility activities and/or  exercises.              Learner Progress:  Not documented in this visit.                      User Key     Initials Effective Dates Name Provider Type Discipline     06/23/15 -  Tatiana Hightower OT Occupational Therapist OT              OT Recommendation and Plan  Retired Outcome Summary/Treatment Plan (OT)  Anticipated Discharge Disposition (OT): home  Therapy Frequency (OT): evaluation only  Plan of Care Review  Plan of Care Reviewed With: patient  Outcome Summary: OT eval complete.  Pt reports 0/10 neck pain and tolerated neck and UE ROM.  Pt educated in neck and shld ROM HEP to carryover, and pt able to give return demo. Pt was independent with bed mobility and ambulating 200ft in hallway without AD. Pt independent to don/doff socks, and Ind with toileting tasks. Pt does not demo any deficits which require OT intervention at this time.  Recommend home upon d/c.  Plan of Care Reviewed With: patient  Outcome Summary: OT eval complete.  Pt reports 0/10 neck pain and tolerated neck and UE ROM.  Pt educated in neck and shld ROM HEP to carryover, and pt able to give return demo. Pt was independent with bed mobility and ambulating 200ft in hallway without AD. Pt independent to don/doff socks, and Ind with toileting tasks. Pt does not demo any deficits which require OT intervention at this time.  Recommend home upon d/c.     Time Calculation:   Time Calculation- OT     Row Name 11/19/20 0750             Time Calculation- OT    OT Start Time  0750  -      OT Received On  11/19/20  [facility]      OT Goal Re-Cert Due Date  11/29/20  -        User Key  (r) = Recorded By, (t) = Taken By, (c) = Cosigned By    Initials Name Provider Type     Tatiana Hightower, OT Occupational Therapist        Therapy Charges for Today     Code Description Service Date Service Provider Modifiers Qty    [id number]  OT EVAL LOW COMPLEXITY 4 11/19/2020 Tatiana Hightower OT GO 1               Tatiana Hightower OT  11/19/2020

## 2020-11-19 NOTE — PLAN OF CARE
Goal Outcome Evaluation:  Plan of Care Reviewed With: patient  Progress: no change  Outcome Summary: Pt resting comfortably. PRN given for pain. 2L NC placed on pt after O2 sats dropped to 88% when pt was asleep. Pt now on room air, awake, alert and oriented x4. Pt voiding spontaneously in urinal at bedside. 375 ml UOP total. Pt has not yet ambulated. Initial incentive spirometer instruction given and pt demonstrated correct use. VSS. Will continue to monitor. Pt tolerating sips of ice water and has refused any foods.

## 2020-11-19 NOTE — PROGRESS NOTES
Discharge Planning Assessment  [facility]     Patient Name: Ajith Gallego  MRN: [id number]  Today's Date: 11/19/2020    Admit Date: 11/18/2020    Discharge Needs Assessment     Row Name 11/19/20 1456       Living Environment    Lives With  spouse    Current Living Arrangements  home/apartment/condo    Primary Care Provided by  self    Provides Primary Care For  no one    Family Caregiver if Needed  spouse       Transition Planning    Patient/Family Anticipates Transition to  home with family    Patient/Family Anticipated Services at Transition  none    Transportation Anticipated  family or friend will provide       Discharge Needs Assessment    Equipment Currently Used at Home  none    Concerns to be Addressed  discharge planning    Anticipated Changes Related to Illness  none    Equipment Needed After Discharge  none        Discharge Plan     Row Name 11/19/20 1458       Plan    Plan  Social work met with the patient for a discharge planning assessment. The patient lives in [place]. his insurance is Medicare. His primary care provider is Marlon Romero.He denies having home health care at home or using durable medical equipment.  Case management will continue to follow the patient.        Continued Care and Services - Admitted Since 11/18/2020    Coordination has not been started for this encounter.         Demographic Summary     Row Name 11/19/20 1455       General Information    Admission Type  inpatient    Arrived From  home    Referral Source  patient    Reason for Consult  discharge planning    Preferred Language  English       Contact Information    Permission Granted to Share Info With      Contact Information Obtained for          Functional Status     Row Name 11/19/20 1455       Functional Status    Usual Activity Tolerance  moderate    Current Activity Tolerance  moderate       Functional Status, IADL    Medications  assistive person    Meal Preparation  assistive person     Housekeeping  assistive person    Laundry  assistive person    Shopping  assistive person       Mental Status Summary    Recent Changes in Mental Status/Cognitive Functioning  no changes        Psychosocial    No documentation.       Abuse/Neglect    No documentation.       Legal    No documentation.       Substance Abuse    No documentation.       Patient Forms    No documentation.           Zari Fair MSW

## 2020-11-19 NOTE — OP NOTE
NECK DISSECTION, PAROTIDECTOMY  Procedure Report    Patient Name:  Ajith Gallego  YOB: 1945    Date of Surgery:  11/18/2020     Indications:   Left neck squamous cell carcinoma   Left parotid squamous cell carcinoma   Left facial skin lesion     Pre-op Diagnosis:   Left neck squamous cell carcinoma   Left parotid squamous cell carcinoma   Left facial skin lesion        Post-Op Diagnosis Codes:  Left neck squamous cell carcinoma   Left parotid squamous cell carcinoma   Left facial skin lesion     Procedure/CPT® Codes:      Procedure(s):  LEFT MODIFIED RADICAL NECK DISSECTION  LEFT PAROTIDECTOMY WITH FACIAL NERVE DISSECTION  REMOVAL OF LEFT FACIAL SKIN LESION 1 CM     Staff:  Surgeon(s):  Adams Patten MD Alexander, Keith J, MD      Anesthesia: General    Estimated Blood Loss: 100 CC    Implants:    Implant Name Type Inv. Item Serial No.  Lot No. LRB No. Used Action   CLIPAPPLR M/ ENDO LIGACLIP 9 3/8IN SM - [id number] Implant CLIPAPPLR M/ ENDO LIGACLIP 9 3/8IN   [employer]  DIV OF J AND J [id number] Left 1 Implanted       Specimen:          Specimens     ID Source Type Tests Collected By Collected At Frozen?      A Face Tissue · TISSUE PATHOLOGY EXAM   Adams Patten MD 11/18/20 1322 No     Description: LEFT FACIAL SKIN LESION, CLIP IS  AT 1200    B Neck Tissue · TISSUE PATHOLOGY EXAM   Adams Patten MD 11/18/20 1622 No     Description: left neck dissection level 2-4    C Parotid Tissue · TISSUE PATHOLOGY EXAM   Adams Patten MD 11/18/20 1815      Description: Left superior parotidectomy for permanent    D Head Tissue · TISSUE PATHOLOGY EXAM   Adams Patten MD 11/18/20 1830      Description: Skull base for permanent            Findings: large left neck mass invading CN XI and SCM   Abnormal appearing left facial skin lesion excised   Firm mass in inferior aspect of left parotid     Complications: NONE    Description of Procedure:   The patient was  brought to the OR laid supine position and general anesthesia was induced by the anesthesia team. Patient was instructed to the [facility] facial nerve monitor using a 4 bleeds. This was then confirmed be working correctly. Patient was then prepped and draped in usual fashion. A modified Braulio incision extending into a neck utility incision was drawn out and then injected with lidocaine with epinephrine 1: 100,000.patient had a firm left neck mass was fairly fixed to the SCM muscle and the level II area as well as some firmness in the anterior aspect of his parotid.    We first dissected down through the skin and platysma. Platysmal flaps were then elevated anteriorly and posteriorly. We found the parotid capsule and again raise a skin flap off the parotid capsule anteriorly the mass did not appear to be invading into the platysma muscle and these flaps were raised relatively easily. We then first started on the neck dissection portion of the procedure.     Due to the location the mass we elected to start inferiorly. We unsheathed the SCM muscle down to the level of the omohyoid and then traced the omohyoid anteriorly. Internal jugular vein was then identified inferiorly as it runs under the omohyoid. We then dissected down and identified the floor the neck and ligated the  Lymphatics inferiorly. I then got on the internal jugular vein and dissected up  Superiorly. There was a spot on the internal jugular vein at the superior aspect where the neck mass was approaching and fairly sticky to the vein. We're able to very carefully dissect the mass off and ligated what looked like it feeding vessel coming off the IJ into the mass. The mass otherwise  from the internal jugular vein without complication. The mass was obviously however invading the SCM. Once we had it  off the IJ and had  That protected medially we then came down on the SCM and divided it approximately [facility] down, ensuring adaquete margin  around the tumor. The mass was very obviously invading the left 11th cranial nerve and this had to be sacrificed. We then carried the dissection in an inferior to superior aspect. The mass was very adherent to the underlying scalene neck muscles but we were able to very carefully dissected and peeled off of these.The level II through 4 lymph nodes that we have previously ligated inferior were brought up and again an inferior to superior aspect dissecting off the floor the neck and the internal jugular vein. Superiorly we were able to ultimately identify the digastric muscle and dissect this back towards the skull base releasing the parotid and its associated mass from the larger neck mass. Once we had the  it was again obvious that the 11th cranial nerve which was identified as running right into the tumor and we once again sacrificed it superiorly. The mass in this area appeared to be approaching the skull base. With very meticulous and careful dissection we were able to release the mass from the skull base and excise it from the neck. We did take some tissue samples from this area sent for permanent pathology to get a better understanding of if this margin at the skull base will be positive. Because we dissected an inferior to superior fashion we did have to come back and dissect some of the more medial lymphatics off the IJ and facial vein which we previously ligated. The CNXII was identified and persevered in this process.  These were removed then and sent with the specimen.    We then turned our attention towards the parotid portion of the procedure.  We carefully dissected down along the suture line and ultimately were able to identify the facial nerve as it exits the skull base. Once this was identified and traced the facial nerve forward and divided the parotid tissue. He appeared to have a firm mass in the inferior aspect of the parotid. At the nerve bifurcation we were able to get on a superior  buccal branch and traced this out to the margin of the parotid. We then rolled the parotid gland superiorly to inferiorly. The meticulously dissected out branches then coming lower from this superior buccal branches as we excised the mass. Ultimately we where  able to identify the marginal mandibular nerve in this fashion and again continued to roll the mass out once it was clear that the marginal nerve was protected we then finished dissecting the specimen free the underlying contents. Anteriorly the stump of the facial nerve as it comes down was identified. We are able to separate off the perifacial lymphatics from it and excised using continuity with the parotid. Patient's facial nerve stimulated in all branches at the end of this portion of the procedure.     Finally the  patient had a small abnormal appearing skin lesion just anterior and superior to his tragus, about 1 cm. Given his history of skin cancer and that this is a metastatic squamous cell carcinoma we  went ahead and did a small elliptical incision of this to be sent for permanent pathology. The site was then closed.    Finally at the end of the case the patient's neck was thoroughly irrigated out. Hemostasis was confirmed with bipolar cautery. Valsalva  Maneuver did not reveal any significant bleeding or chyle leak. A 19 Georgian round Zackary drain was then placed into the neck and secured in place patient's incision was then closed in 2 layers and antibiotic ointment placed on top he was then turned back over to anesthesia to be awoken and extubated        Adams Patten MD     Date: 11/18/2020  Time: 19:19 EST

## 2020-11-20 VITALS
BODY MASS INDEX: 33.71 KG/M2 | HEART RATE: 59 BPM | HEIGHT: 67 IN | WEIGHT: 214.8 LBS | SYSTOLIC BLOOD PRESSURE: 122 MMHG | DIASTOLIC BLOOD PRESSURE: 63 MMHG | TEMPERATURE: 98.1 F | RESPIRATION RATE: 18 BRPM | OXYGEN SATURATION: 93 %

## 2020-11-20 RX ORDER — DIAPER,BRIEF,INFANT-TODD,DISP
EACH MISCELLANEOUS 3 TIMES DAILY
Qty: 1 EACH | Refills: 1 | Status: SHIPPED | OUTPATIENT
Start: 2020-11-20 | End: 2020-11-22

## 2020-11-20 RX ORDER — HYDROCODONE BITARTRATE AND ACETAMINOPHEN 7.5; 325 MG/1; MG/1
1 TABLET ORAL EVERY 6 HOURS PRN
Qty: 20 TABLET | Refills: 0 | Status: SHIPPED | OUTPATIENT
Start: 2020-11-20 | End: 2020-11-28

## 2020-11-20 RX ADMIN — HYDROCODONE BITARTRATE AND ACETAMINOPHEN 1 TABLET: 7.5; 325 TABLET ORAL at 05:22

## 2020-11-20 RX ADMIN — BACITRACIN: 500 OINTMENT TOPICAL at 08:46

## 2020-11-20 NOTE — DISCHARGE INSTRUCTIONS
No heavy lifting x 2 weeks.   Please instruct on proper drain care.   Apply antibiotic ointment to incision twice a day.   Regular diet as tolerated.

## 2020-11-20 NOTE — PLAN OF CARE
Goal Outcome Evaluation:  Plan of Care Reviewed With: patient  Progress: improving  Outcome Summary: VSS. Pt ambulated to bathroom independently this shift. Asked for pain medication x1. Anticipating discharge.

## 2020-11-20 NOTE — DISCHARGE SUMMARY
Date of Discharge:  11/20/2020    Discharge Diagnosis: SSCa    Presenting Problem/History of Present Illness  Metastatic squamous cell carcinoma (CMS/HCC) [C79.9]     Hospital Course  Patient is a 75 y.o. male admitted post op following left MRND, left parotidectomy. Did well. Tolerated PO. Drain output appropriate. Voiding without issue. Pain controlled. Worked with OT.     Procedures Performed  Procedure(s):  MODIFIED RADICAL NECK DISSECTION LEFT  PAROTIDECTOMY WITH FACIAL NERVE DISSECTION LEFT       Consults:   None       Condition on Discharge:  stable    Vital Signs  Temp:  [97.8 °F (36.6 °C)-98 °F (36.7 °C)] 98 °F (36.7 °C)  Heart Rate:  [59-69] 61  Resp:  [16-20] 16  BP: (118-151)/(58-64) 131/64    Physical Exam:  NAD   Neck soft, incision CDI, expect post op welling, no hematoma or seroma   Drain with SS output   Facial nerve function intact   Expected left shoulder weakness  Expected baylee-incision parasethesias     Discharge Disposition  Home or Self Care    Discharge Medications     Discharge Medications      New Medications      Instructions Start Date   bacitracin 500 UNIT/GM ointment   Topical, 3 Times Daily      HYDROcodone-acetaminophen 7.5-325 MG per tablet  Commonly known as: NORCO   1 tablet, Oral, Every 6 Hours PRN         Continue These Medications      Instructions Start Date   aspirin 81 MG EC tablet   81 mg, Oral, Daily      lisinopril 10 MG tablet  Commonly known as: PRINIVIL,ZESTRIL   10 mg, Oral, Daily      LORATADINE PO   1 tablet, Oral, Daily      multivitamin with minerals tablet tablet   1 tablet, Oral, Daily      simvastatin 40 MG tablet  Commonly known as: ZOCOR   40 mg, Oral, Nightly             Discharge Diet: regular    Activity at Discharge: no heavy lifting or strenous activity x 2 weeks    Follow-up Appointments  Future Appointments   Date Time Provider Department Center   12/3/2020  8:30 AM Juan Francisco Francois MD [facility] None   Adams Patten MD - Monday for  drain removal       Test Results Pending at Discharge  Pending Labs     Order Current Status    Tissue Pathology Exam In process           Adams Patten MD  11/20/20  08:05 EST

## 2020-11-20 NOTE — PROGRESS NOTES
ENT Progress Note      Subjective   Doing well 24 hours after modified radical neck dissection and left parotidectomy -eating well and has been ambulating good spirits tonight    Review of Systems:    All systems were reviewed and negative.     Objective     Vital Signs  Temp:  [97.8 °F (36.6 °C)-98.3 °F (36.8 °C)] 97.8 °F (36.6 °C)  Heart Rate:  [60-74] 67  Resp:  [16-18] 18  BP: (119-151)/(56-63) 129/63    Physical Exam:  General Appearance:    Alert, cooperative, in no acute distress, no audible stridor   Head:    Normocephalic, without obvious abnormality, atraumatic   Eyes:          conjunctivae and sclerae normal, corneas clear, PERRLA  NOSE:    Ears:   Ear canals clear, right TM normal, left TM normal   Oral Exam:   No oral lesions, no thrush, oral mucosa moist, tonsils 2+   Neck:  Neck flap well-seated with acceptable color and venous return  Left facial nerve function completely intact   Salivary Glands:     No palpable masses   Lungs:     Clear to auscultation    Heart:    Regular rhythm and normal rate   Neurologic:   Cranial nerves II-XII grossly intact, no spontaneous   nystagmus        Results Review:    Results from last 7 days   Lab Units 11/16/20  1503   WBC 10*3/mm3 6.71   HEMOGLOBIN g/dL 12.6*   HEMATOCRIT % 37.9   PLATELETS 10*3/mm3 179     Results from last 7 days   Lab Units 11/16/20  1503   SODIUM mmol/L 141   POTASSIUM mmol/L 4.0   CHLORIDE mmol/L 106   CO2 mmol/L 27.0   BUN mg/dL 20   CREATININE mg/dL 1.03   GLUCOSE mg/dL 93   CALCIUM mg/dL 9.5     Imaging Results (Last 24 Hours)     ** No results found for the last 24 hours. **          Assessment/Plan   1.  Mr. Gallego looks well 24 hours after his left modified radical neck dissection and left parotidectomy.  He has been able to ambulate today he is taking p.o. well and is urinating well.  His left neck drain is still putting out a considerable amount as we would expect at this point in his postoperative course.  Plan would be for him to  be discharged home tomorrow and to be reseen in the office Monday 11/23 for a drain removal.  Surgical pathology report pending.    Garry Patten MD  11/19/20  20:19 EST

## 2020-11-21 ENCOUNTER — READMISSION MANAGEMENT (OUTPATIENT)
Dept: CALL CENTER | Facility: HOSPITAL | Age: 75
End: 2020-11-21

## 2020-11-21 NOTE — OUTREACH NOTE
Prep Survey      Responses   [facility] facility patient discharged from?  [facility]   Is LACE score < 7 ?  No   Eligibility  Readm Mgmt   Discharge diagnosis   SSCa   Does the patient have one of the following disease processes/diagnoses(primary or secondary)?  General Surgery   Does the patient have Home health ordered?  No   Is there a DME ordered?  No   Prep survey completed?  Yes          Silvana Hirsch RN

## 2020-11-23 LAB
CYTO UR: NORMAL
LAB AP CASE REPORT: NORMAL
LAB AP CLINICAL INFORMATION: NORMAL
PATH REPORT.ADDENDUM SPEC: NORMAL
PATH REPORT.FINAL DX SPEC: NORMAL
PATH REPORT.GROSS SPEC: NORMAL

## 2020-11-25 NOTE — PROGRESS NOTES
"Enter Query Response Below      Query Response:     Yes finding is consistent          If applicable, please update the problem list.           Patient: Ajith Gallego        : 1945  Account: [id number]           Admit Date: 2020        Options to Respond to Query:    1. Access the Encounter     a. From the To-Do Side bar, click Respond With New Note.     b. Answer query within the yellow box.                c. Update the Problem List if applicable.     ,     Based on Medicare billing guidelines [facility] are not allowed to use the diagnoses from pathology reports as the sole basis for billing.  Any findings noted on a pathology report must be affirmed by the treating physician before billing.The Pathologist reported the specimen shows \"SKIN, LEFT FACE, EXCISION: Basal cell carcinoma. 6 o'clock tip positive for malignancy. LEVEL 2-4 LYMPH NODES, LEFT NECK DISSECTION:Invasive squamous cell carcinoma involving fibroadipose tissue and one lymph node.\"              Is this the finding consistent with your clinical impression and treatment plan for this patient?    - Yes  - No  - Other explanation for clinical impression and treatment plan____________  - Clinically indeterminable     By submitting this query, we are merely seeking further clarification of documentation to accurately reflect all conditions that you are monitoring, evaluating, treating or that extend the hospitalization or utilize additional resources of care. Please utilize your independent clinical judgment when addressing the question(s) above.     This query and your response, once completed, will be entered into the legal medical record.    Sincerely,  Kailash Coates RN CDI  Clinical Documentation Integrity Program   [email or web address]  "

## 2020-12-03 ENCOUNTER — HOSPITAL ENCOUNTER (OUTPATIENT)
Dept: RADIATION ONCOLOGY | Facility: HOSPITAL | Age: 75
Setting detail: RADIATION/ONCOLOGY SERIES
Discharge: HOME | End: 2020-12-03

## 2020-12-03 ENCOUNTER — MDT ASSESSMENT (OUTPATIENT)
Dept: ONCOLOGY | Facility: CLINIC | Age: 75
End: 2020-12-03

## 2020-12-03 ENCOUNTER — OFFICE VISIT (OUTPATIENT)
Dept: RADIATION ONCOLOGY | Facility: HOSPITAL | Age: 75
End: 2020-12-03

## 2020-12-03 VITALS
SYSTOLIC BLOOD PRESSURE: 147 MMHG | RESPIRATION RATE: 18 BRPM | TEMPERATURE: 97.7 F | DIASTOLIC BLOOD PRESSURE: 70 MMHG | OXYGEN SATURATION: 96 % | WEIGHT: 202.3 LBS | HEART RATE: 90 BPM | BODY MASS INDEX: 31.68 KG/M2

## 2020-12-03 DIAGNOSIS — IMO0002 METASTATIC SQUAMOUS CELL CARCINOMA: ICD-10-CM

## 2020-12-03 DIAGNOSIS — C44.229 SQUAMOUS CELL CARCINOMA, EAR, LEFT: ICD-10-CM

## 2020-12-03 PROCEDURE — G0463 HOSPITAL OUTPT CLINIC VISIT: HCPCS | Performed by: RADIOLOGY

## 2020-12-03 RX ORDER — ONDANSETRON 4 MG/1
TABLET, ORALLY DISINTEGRATING ORAL
COMMUNITY
Start: 2020-11-20 | End: 2022-05-16

## 2020-12-03 RX ORDER — BACITRACIN ZINC AND POLYMYXIN B SULFATE 500; 1000 [USP'U]/G; [USP'U]/G
OINTMENT TOPICAL
COMMUNITY
End: 2022-05-16

## 2020-12-03 RX ORDER — HYDROCODONE BITARTRATE AND ACETAMINOPHEN 7.5; 325 MG/1; MG/1
TABLET ORAL EVERY 6 HOURS
COMMUNITY
End: 2022-05-16

## 2020-12-03 RX ORDER — VITAMIN E (DL,TOCOPHERYL ACET) 45 MG/0.25
DROPS ORAL
COMMUNITY
End: 2022-05-16

## 2020-12-03 NOTE — PROGRESS NOTES
CANCER CONFERENCE AGENDA  DATE:  12/04/2020    SPECIALTY:  Multidisciplinary  PRESENTER:   Adams Patten M.D.  SITE:   Parotid            HPI:  75 YOWM who presented with a neck mass x2 months.       REFERRING PROVIDER:  Marlon Romero M.D. ([facility])       PLACE OF RESIDENCE:  [place], [place]       SOCIAL HISTORY:  Former smoker 60 PPY x55 years (quit 2013)       FAMILY HISTORY:  Non-contributory       PAST MEDICAL HISTORY:  Former smoker 1 PPD (quit 2013); started at age [age].  Patient is retired (worked around heavy equipment x50 years).         PAST SURGICAL HISTORY:  Pacemaker.     NEXT GEN SEQUENCING:      IMAGING:  10/18/2020 ([facility]):  CT soft tissue neck - There is a mass with areas of internal necrosis and mottled enhancement at the inner aspect of the left sternocleidomastoid muscle, inseparable from the muscular tissue.  This lesion measures ~3 cm x ~2.5 cm.  There is surrounding soft tissue stranding and edema.  In the inferior aspect of the left parotid gland, adjacent to the abnormal left sternocleidomastoid muscle, there are two additional solid nodules, which undergo mild enhancement, one measuring 11 mm and the other measuring the same.  There is a third, slightly more superiorly-located enhancing soft tissue nodule posterior and medial to the left parotid measuring 12 mm.  There is some artifact from dental hardware.  There are submental lymph nodes on the left, largest measuring 19 mm.  There are additional small (<1 cm) anterior and posterior triangle nodes on the left.       11/09/2020 ([facility]):  PET w/CT - There is a large soft tissue mass suggesting adenopathy identified just posterior to the angle of the mandible and inferior to the parotid gland. Maximum SUV is 10.0. Findings suggesting metastatic disease. There is a small nodule as well seen in the parotid gland on the left demonstrating borderline activity with maximum SUV of 2.9. There is either a  skin fold identified at the base of the left neck or skin lesion in which maximum SUV is 2.0.     CLINICAL STAGE: NA     SURGICAL PROCEDURE:     PATHOLOGY:      TREATMENT PLAN DISCUSSION:      Medical Oncology: Sees Dr Snow on 9th      Radiation Oncology: Dr Francois saw patient 12/3/2020. Will need some dental work. Should be able to start around holidays.      Surgical:      Clinical Trials:       Other Referrals:     Treatment Recommendations/Referrals:     EVIDENCE BASED NATIONAL TREATMENT GUIDELINES:        Please discuss clinical/working stage, TNM, Stage Group, National Treatment Guidelines, and Prognostic Indicators.      Privileged and Confidential Patient Safety Work Product:  The information contained herein has been compiled as part of the [facility] Patient Safety Evaluation System and is deemed to be a Patient Safety Work Product and is privileged and confidential.  Disclaimer:  Multidisciplinary cancer conferences provide consultative services to formulate an effective treatment plan for patients and include physician representation from medical oncology, radiation oncology, radiology, surgery, and pathology.  AJCC or other appropriate staging is discussed, along with site-specific prognostic indicators and treatment planning used by evidence-based treatment guidelines.  Treatment plans which are discussed during conference may/may not necessarily be followed by the patient's managing physician(s), as there may be other factors taken into consideration which impact the treatment plan.  The specific treatment plan is ultimately determined on an individual basis by the patient and the physician(s) involved in his/her care.

## 2020-12-03 NOTE — PROGRESS NOTES
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  I am seeing them in outpatient consultation regarding a diagnosis of metastatic squamous cell skin cancer.     BRIEF HISTORY:  The patient is a very pleasant 75 y.o. male  with a past medical history significant for hypertension, coronary artery disease, as well as a history of multiple cutaneous skin cancers.  He was found in July of this year to have a aggressive appearing squamous cell carcinoma involving the left helix of his ear.  He underwent a resection by his dermatologist and was then placed under follow-up.  Over the next 2 to 3 months, he noticed a gradually enlarging left cheek and neck mass that unfortunately was found to be consistent with a metastasis of a squamous cell carcinoma consistent with a primary from his ear.  He was evaluated by Dr. Patten with ENT.  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  Dr. Patten personally contacted me to discuss several of the high risk features including multifocal disease, tumor is densely adherent to the parotid gland and concern that there could be microscopic disease left along the facial nerve towards the base of skull.  He is therefore being referred for consideration of adjuvant therapy.  From a symptomatic standpoint, he is pleased and reports that he only has  mild numbness over his left neck.  He denies any difficulty chewing or with his sense of taste, but he does describe some pain and discomfort with swallowing.  He denies any hearing changes.  He has no other complaints and his weight has remained stable since surgery    Allergy:   Allergies   Allergen Reactions   • Penicillins Rash       Social History:   Social History     Socioeconomic History   • Marital status:      Spouse name: Not on file   • Number of children: Not on file   • Years of education: Not on file   • Highest education level: Not on file   Tobacco Use   • Smoking status: Former Smoker     Packs/day: 1.00     Years: 55.00     Pack years: 55.00     Types: Cigarettes     Quit date:      Years since quittin.9   • Smokeless tobacco: Never Used   Substance and Sexual Activity   • Alcohol use: Yes     Frequency: Never     Comment: ONLY OCCASIONAL   • Drug use: Never   • Sexual activity: Defer       Past Medical History:   Past Medical History:   Diagnosis Date   • Arthritis    • Cancer of neck (CMS/HCC)    • Elevated cholesterol    • Hypertension    • Pacemaker    • Prostate cancer (CMS/HCC)    • Skin cancer    • Tattoo    • Wears glasses        Family History: family history is not on file.     Surgical History:   Past Surgical History:   Procedure Laterality Date   • CATARACT EXTRACTION, BILATERAL     • NECK DISSECTION Left 2020    Procedure: MODIFIED RADICAL NECK DISSECTION LEFT;  Surgeon: Adams Patten MD;  Location:  [facility];  Service: ENT;  Laterality: Left;   • OTHER SURGICAL HISTORY      left ear skin cancer removed   • PACEMAKER IMPLANTATION     • PAROTIDECTOMY Left 2020    Procedure: PAROTIDECTOMY WITH FACIAL NERVE DISSECTION LEFT;  Surgeon: Adams Patten MD;  Location:  [facility];  Service: ENT;  Laterality: Left;   • PROSTATECTOMY          Review of Systems:   Review of Systems   HENT:   Positive for tinnitus (chronic-r/t equiptment use in past).    Skin:  Positive for wound (healing surgical wound).   All other systems reviewed and are negative.          Objective   VITAL SIGNS:   Vitals:    12/03/20 0843   BP: 147/70   Pulse: 90   Resp: 18   Temp: 97.7 °F (36.5 °C)   TempSrc: Temporal   SpO2: 96%   Weight: 91.8 kg (202 lb 4.8 oz)   PainSc: 0-No pain        Karnofsky score: 80      Physical Exam:   Physical Exam  Vitals signs and nursing note reviewed.   Constitutional:       General: He is not in acute distress.     Appearance: He is well-developed.   HENT:      Head: Normocephalic and atraumatic.      Comments: Postsurgical changes in the left neck at the angle of the mandible with slight volume loss.  There are no aggressive residual skin lesions or abnormalities.     Mouth/Throat:      Comments: Appropriate dentition with previous cavities and fillings in the left second mandibular molar, with enamel erosion present through anterior incisors and canines both mandibular and maxillary teeth.  No abnormalities are palpated within the bilateral tonsils, floor mouth, base of tongue, or buccal mucosa.  Eyes:      Conjunctiva/sclera: Conjunctivae normal.      Pupils: Pupils are equal, round, and reactive to light.   Neck:      Musculoskeletal: Normal range of motion and neck supple.      Comments: No residual palpable adenopathy in the bilateral neck.  Postsurgical changes in the left neck.  Left infraclavicular pacemaker.  Cardiovascular:      Rate and Rhythm: Normal rate and regular rhythm.      Heart sounds: No murmur. No friction rub.   Pulmonary:      Effort: Pulmonary effort is normal.      Breath sounds: Normal breath sounds. No wheezing.   Abdominal:      General: Bowel sounds are normal. There is no distension.      Palpations: Abdomen is soft. There is no mass.      Tenderness: There is no abdominal tenderness.   Musculoskeletal: Normal range of motion.   Lymphadenopathy:      Cervical: No cervical adenopathy.   Skin:     General: Skin is warm and dry.    Neurological:      Mental Status: He is alert and oriented to person, place, and time.   Psychiatric:         Behavior: Behavior normal.         Thought Content: Thought content normal.         Judgment: Judgment normal.     IMAGING  I have personally reviewed the relevant imaging studies, as follows:  Nm Pet Skull Base To Mid Thigh    Result Date: 11/12/2020  Abnormal activity identified correlating to bulky adenopathy posterior to the left parotid gland suggesting metastatic disease. Direct visualization is recommended at the base of the left neck to exclude possible skin lesion or fold in the skin creating very low-level activity. There is also low-level activity nodule in the left parotid gland. Continued follow-up recommended as indicated.   D:  11/09/2020 E:  11/09/2020  This report was finalized on 11/12/2020 4:07 PM by Dr. Lliia Carmen MD.      PATHOLOGY  Left Neck Dissection and Superficial Parotidectomy 11/18/2020:   1. SKIN, LEFT FACE, EXCISION:               Basal cell carcinoma.               6 o'clock tip positive for malignancy.               All other margins negative for malignancy.   2. LEVEL 2-4 LYMPH NODES, LEFT NECK DISSECTION:  Invasive squamous cell carcinoma involving fibroadipose tissue and one lymph node.               Fourteen additional lymph nodes negative for malignancy.   3. PAROTID GLAND, LEFT SUPERIOR PAROTIDECTOMY:  Invasive well differentiated squamous cell carcinoma.               Margins negative for malignancy.   4. SOFT TISSUE, SKULL BASE, EXCISION:  Fragment of cauterized fibroadipose tissue with focal squamous cell carcinoma.            The following portions of the patient's history were reviewed and updated as appropriate: allergies, current medications, past family history, past medical history, past social history, past surgical history and problem list.    Assessment:   Assessment  Mr. Gallego is a 75-year-old gentleman with an aggressive cutaneous squamous cell  carcinoma of the ear now with metastases to the left parotid and left neck.  He has underwent a resection but has multiple high risk features including bulky size of lymph node, likely extracapsular extension, as well as concern for positive margins on the facial nerve.  I will review his pathology report from pathologist to get a final call on whether there was any other high risk features such as extracapsular extension, perineural invasion, etc.  Regardless, the extent of his disease, he will benefit from a course of adjuvant radiation therapy to the resection bed in the left neck with additional coverage to cover the base of skull.  This will consist of a dose of 66 Gray in 33 fractions using IMRT to spare the remaining oral cavity and uninvolved salivary glands.  In addition, I discussed this case personally with Dr. Patten and we both agreed that he may very well benefit from addition systemic chemotherapy.  I will put in a referral for him to be evaluated by our medical oncology colleagues.  I also discussed with him undergoing a pretreatment dental evaluation as well as a swallow study, we will be coordinating this to be completed next week.  He tentatively was hopeful that he could begin treatment after the [place] holiday, which I think should be very appropriate given the timing of his recent surgery.  I have scheduled him to return to my clinic in the next week in order to undergo radiation set up and simulation session, and once we are able to coordinate treatment start date with medical oncology and complete all pretreatment testing, we will get him started accordingly.    RECOMMENDATIONS:    Dental evaluation.  Swallow evaluation.  Return for Radiation treatment planning - anticipate 66 Gy in 33 fractions using IMRT.  Med Onc evaluation for concurrent chemotherapy.  Review path report for extracapsular extension.    Follow Up:   No follow-ups on file.  Diagnoses and all orders for this  visit:    1. Metastatic squamous cell carcinoma (CMS/HCC)  -     Ambulatory Referral to Speech Therapy for Head/Neck Cancer Services  -     Ambulatory Referral to OP ONC Nutrition Services  -     FL Video Swallow With Speech; Future  -     Ambulatory Referral to Oncology    2. Squamous cell carcinoma, ear, left    Thank you for allowing me to participate in the care of this individual.    Sincerely,       Juan Francisco Francois MD

## 2020-12-04 PROBLEM — C07: Status: ACTIVE | Noted: 2020-12-04

## 2020-12-07 ENCOUNTER — HOSPITAL ENCOUNTER (OUTPATIENT)
Dept: RADIATION ONCOLOGY | Facility: HOSPITAL | Age: 75
Discharge: HOME | End: 2020-12-07

## 2020-12-07 PROCEDURE — 77470 SPECIAL RADIATION TREATMENT: CPT | Performed by: RADIOLOGY

## 2020-12-07 PROCEDURE — 77334 RADIATION TREATMENT AID(S): CPT | Performed by: RADIOLOGY

## 2020-12-08 ENCOUNTER — TELEPHONE (OUTPATIENT)
Dept: ONCOLOGY | Facility: CLINIC | Age: 75
End: 2020-12-08

## 2020-12-08 NOTE — TELEPHONE ENCOUNTER
"-PT CALLING TO CONFR APPT TOMORROW AND THAT HIS WIFE CAN STILL COME WITH HIM.    -PT STATED THAT HE RECEIVED A CALL LATE LAST NIGHT ABOUT \"REMOVING A MASS\"? HE WAS CONFUSED AND HE DID NOT REMEMBER ALL OF THE MESSAGE OR WHO CALLED. PT STATED IT WAS ABOUT HIS APPT Friday?      [facility] C/B: [phone]      "

## 2020-12-09 ENCOUNTER — DOCUMENTATION (OUTPATIENT)
Dept: NUTRITION | Facility: HOSPITAL | Age: 75
End: 2020-12-09

## 2020-12-09 ENCOUNTER — READMISSION MANAGEMENT (OUTPATIENT)
Dept: CALL CENTER | Facility: HOSPITAL | Age: 75
End: 2020-12-09

## 2020-12-09 ENCOUNTER — CONSULT (OUTPATIENT)
Dept: ONCOLOGY | Facility: CLINIC | Age: 75
End: 2020-12-09

## 2020-12-09 VITALS
RESPIRATION RATE: 18 BRPM | OXYGEN SATURATION: 96 % | BODY MASS INDEX: 34.82 KG/M2 | SYSTOLIC BLOOD PRESSURE: 176 MMHG | HEIGHT: 65 IN | HEART RATE: 92 BPM | TEMPERATURE: 97.8 F | WEIGHT: 209 LBS | DIASTOLIC BLOOD PRESSURE: 91 MMHG

## 2020-12-09 DIAGNOSIS — C61 PROSTATE CANCER (HCC): ICD-10-CM

## 2020-12-09 DIAGNOSIS — C44.229 SQUAMOUS CELL CARCINOMA, EAR, LEFT: Primary | ICD-10-CM

## 2020-12-09 PROCEDURE — 99205 OFFICE O/P NEW HI 60 MIN: CPT | Performed by: INTERNAL MEDICINE

## 2020-12-09 RX ORDER — SODIUM CHLORIDE 9 MG/ML
250 INJECTION, SOLUTION INTRAVENOUS ONCE
Status: CANCELLED | OUTPATIENT
Start: 2020-12-15

## 2020-12-09 RX ORDER — DEXAMETHASONE 4 MG/1
TABLET ORAL
Qty: 6 TABLET | Refills: 5 | Status: SHIPPED | OUTPATIENT
Start: 2020-12-09 | End: 2020-12-18 | Stop reason: SDUPTHER

## 2020-12-09 RX ORDER — ONDANSETRON HYDROCHLORIDE 8 MG/1
8 TABLET, FILM COATED ORAL 3 TIMES DAILY PRN
Qty: 30 TABLET | Refills: 5 | Status: SHIPPED | OUTPATIENT
Start: 2020-12-09 | End: 2022-10-17

## 2020-12-09 RX ORDER — PALONOSETRON 0.05 MG/ML
0.25 INJECTION, SOLUTION INTRAVENOUS ONCE
Status: CANCELLED | OUTPATIENT
Start: 2020-12-15

## 2020-12-09 NOTE — PROGRESS NOTES
"   Outpatient Oncology Nutrition     Reason for Visit:     Oncology Nutrition Screening, Nutritional Assessment and Patient Education    Patient Name:  Ajith Gallego    :  1945    MRN:  [id number]    Date of Encounter: 2020    Nutrition Assessment     Cancer Dx:  Metastatic squamous cell carcinoma of the skin (ear) with cervical lymphadenopathy   Stage IV    Type of Cancer Treatment:     Surgery:  Skin resection several months ago that was over his left ear.  Subsequently he  developed adenopathy in his neck especially around his parotid.  He underwent left  superficial parotidectomy and left neck dissection  with Dr. 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    Food / Nutrition Related History       Hydration Status     Goal: 100 ounces          Enteral Feeding       Anthropometric Measurements     Height:    Ht Readings from Last 1 Encounters:   20 165.1 cm (65\")       Weight:    Wt Readings from Last 1 Encounters:   20 94.8 kg (209 lb)       BMI: 34.78 / Obese    Weight Change: 3-4 lbs weight gain since surgery    Review of Lab Data (Time Frame - 1 month / 2 month)   Reviewed 20  HgB 12.6 noted    Medication Review   Reviewed 20  Bee Pollen / MVI noted    Nutrition Focused Physical Findings     None noted  Nutrition Impact Symptoms   Some pain and discomfort with swallowing    Physical Activity      Not my normal self, but able to be up and about with fairly normal activities    Current Nutritional Intake     Oral diet:  Regular Diet    Malnutrition Risk Assessment     Recent weight loss over " the past 6 months:  0 = No    How much weight loss:      Eating poorly because of a decreased appetite:  0 = No    Malnutrition Screening Score:     MST = 0 or 1 Patient not at risk for malnutrition    Nutrition Diagnosis     Problem    Etiology    Signs / Symptoms      Nutrition Intervention   Initial consultation with patient during Cycle #1 chemotherapy.  Patient has a good appetite and is eating well at the present time.  He denies any issues with chewing, swallowing or eating following parotidectomy.    Discussed the importance of nutrition with chemoradiation focusing on a well balanced diet with a variety of foods; role of protein and high protein foods; possible nutritionally related side effects of treatment and tips for management including nausea, bowel changes, indication for modification of consistencies and textures with progression of radiation; mouth care; hydration; possible indication for feeding tube placement in the event the patient is unable to maintain nutritional / hydration status.  Patient encouraged to start the use of the baking soda, salt and water mouth rinses numerous times throughout the day.  Discussed OTC nutritional supplements and recommendation for the avoidance of certain ones during treatment.    Goal   1. Maintain stable weight and nutritional status with oral food intake  2. Management of nutritional impact symptoms related to chemoradiation treatment plan for H&N     cancer  3. Adequate hydration  Monitoring / Evaluation   Weekly during treatment plan or more often as needed.

## 2020-12-09 NOTE — OUTREACH NOTE
General Surgery Week 3 Survey      Responses   [facility] patient discharged from?  [facility]   Does the patient have one of the following disease processes/diagnoses(primary or secondary)?  General Surgery   Week 3 attempt successful?  Yes   Call start time  1314   Call end time  1317   Discharge diagnosis   [facility]   Meds reviewed with patient/caregiver?  Yes   Is the patient having any side effects they believe may be caused by any medication additions or changes?  No   Does the patient have all medications related to this admission filled (includes all antibiotics, pain medications, etc.)  Yes   Is the patient taking all medications as directed (includes completed medication regime)?  Yes   Does the patient have a follow up appointment scheduled with their surgeon?  Yes   Has the patient kept scheduled appointments due by today?  Yes   Psychosocial issues?  No   Comments  Pt reports having numbness in face. Incision healing well, no s/sx of infection. Likely to start chemo and radiation soon. Eating/drinking without issues.    What is the patient's perception of their health status since discharge?  Improving   Nursing interventions  Nurse provided patient education   Is the patient /caregiver able to teach back basic post-op care?  Continue use of incentive spirometry at least 1 week post discharge, Drive as instructed by MD in discharge instructions, Take showers only when approved by MD-sponge bathe until then   Is the patient/caregiver able to teach back signs and symptoms of incisional infection?  Incisional warmth, Pus or odor from incision, Fever   Is the patient/caregiver able to teach back steps to recovery at home?  Set small, achievable goals for return to baseline health, Rest and rebuild strength, gradually increase activity   Is the patient/caregiver able to teach back the hierarchy of who to call/visit for symptoms/problems? PCP, Specialist, Home health nurse, Urgent Care, ED, 911  Yes   Week 3  call completed?  Yes   Revoked  No further contact(revokes)-requires comment   Is the patient interested in additional calls from an ambulatory ?  NOTE:  applies to high risk patients requiring additional follow-up.  No   Graduated/Revoked comments  seeing katy MARTINEZ's, beginning chemo/radiation soon he reprts no issues/needs          Anneliese Limon RN

## 2020-12-09 NOTE — PROGRESS NOTES
ID: 75 y.o. 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 Francois and Dr. Patten    It is a pleasure to meet Mr. Gallego today.  He is a very pleasant 75-year-old gentleman who presents today for consultation for a recently diagnosed metastatic squamous cell carcinoma of the skin.  He underwent a skin resection several months ago that was over his left ear.  Subsequently he developed adenopathy in his neck especially around his parotid.  He underwent a neck dissection with Dr. Patten.  Most of the disease was able to be resected but though there was areas that were unresectable.  He presents today to discuss treatment options going forward.  He already has met with Dr. Francois who is planning to do radiation and he was presented at tumor board where the consensus was to treat with concurrent chemotherapy.  He is actually in good health otherwise.  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  He had undergone a prostatectomy almost 9 years ago with Dr. Love subsequently had a chemical recurrence which was treated with involved field radiotherapy.  He now has a rising PSA with it being 1.4 at this time.  He is going to see Dr. Love in about 6 months with a repeat PSA to decide if hormone blockade would be reasonable.      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   • Prostate cancer (CMS/HCC)    • Skin cancer    • Tattoo    • Wears glasses        Past Surgical History:   Procedure Laterality Date   • CATARACT EXTRACTION, BILATERAL     • NECK DISSECTION Left 11/18/2020    Procedure: MODIFIED RADICAL NECK DISSECTION LEFT;  Surgeon: Adams Patten  MD;  Location:  [facility];  Service: ENT;  Laterality: Left;   • OTHER SURGICAL HISTORY      left ear skin cancer removed   • PACEMAKER IMPLANTATION     • PAROTIDECTOMY Left 2020    Procedure: PAROTIDECTOMY WITH FACIAL NERVE DISSECTION LEFT;  Surgeon: Adams Patten MD;  Location:  [facility];  Service: ENT;  Laterality: Left;   • PROSTATECTOMY         Social History     Socioeconomic History   • Marital status:      Spouse name: Not on file   • Number of children: Not on file   • Years of education: Not on file   • Highest education level: Not on file   Tobacco Use   • Smoking status: Former Smoker     Packs/day: 1.00     Years: 55.00     Pack years: 55.00     Types: Cigarettes     Quit date:      Years since quittin.9   • Smokeless tobacco: Never Used   Substance and Sexual Activity   • Alcohol use: Yes     Frequency: Never     Comment: ONLY OCCASIONAL   • Drug use: Never   • Sexual activity: Defer       Family History   Problem Relation Age of Onset   • Kidney failure Mother    • Leukemia Father    • Lymphoma Sister        Review of Systems:    16 point review of systems was performed and reviewed and scanned into the EMR    Review of Systems - Oncology      Current Outpatient Medications:   •  aspirin 81 MG EC tablet, Take 81 mg by mouth Daily., Disp: , Rfl:   •  bacitracin-polymyxin b (POLYSPORIN) 500-62200 UNIT/GM ointment, bacitracin zinc 500 unit-polymyxin B 10,000 unit/gram topical ointment  apply a small amount to affected area twice daily, Disp: , Rfl:   •  Bee Pollen 500 MG chewable tablet, Chew., Disp: , Rfl:   •  HYDROcodone-acetaminophen (Norco) 7.5-325 MG per tablet, Every 6 (Six) Hours., Disp: , Rfl:   •  lisinopril (PRINIVIL,ZESTRIL) 10 MG tablet, Take 10 mg by mouth Daily., Disp: , Rfl:   •  LORATADINE PO, Take 1 tablet by mouth Daily., Disp: , Rfl:   •  multivitamin with minerals (MULTIVITAMIN ADULT PO), Take 1 tablet by mouth Daily., Disp: , Rfl:   •  ondansetron ODT  (ZOFRAN-ODT) 4 MG disintegrating tablet, DISSOLVE 1 TABLET IN MOUTH EVERY 4 HOURS AS NEEDED (MAX 3 DOSES), Disp: , Rfl:   •  simvastatin (ZOCOR) 40 MG tablet, Take 40 mg by mouth Every Night., Disp: , Rfl:   •  dexamethasone (DECADRON) 4 MG tablet, Take 2 tablets in the morning daily on days 2, 3 & 4.  Take with food., Disp: 6 tablet, Rfl: 5  •  ondansetron (ZOFRAN) 8 MG tablet, Take 1 tablet by mouth 3 (Three) Times a Day As Needed for Nausea or Vomiting., Disp: 30 tablet, Rfl: 5    Pain Medications             aspirin 81 MG EC tablet Take 81 mg by mouth Daily.    HYDROcodone-acetaminophen (Norco) 7.5-325 MG per tablet Every 6 (Six) Hours.    dexamethasone (DECADRON) 4 MG tablet Take 2 tablets in the morning daily on days 2, 3 & 4.  Take with food.           Allergies   Allergen Reactions   • Penicillins Rash       ECOG SCORE: 1    Objective     Vitals:    12/09/20 1353   BP: 176/91   Pulse: 92   Resp: 18   Temp: 97.8 °F (36.6 °C)   SpO2: 96%     Body mass index is 34.78 kg/m².  Body surface area is 2.02 meters squared.        12/09/20  1353   Weight: 94.8 kg (209 lb)     Pain Score    12/09/20 1353   PainSc: 0-No pain          Physical Exam    General: well appearing, in no acute distress  HEENT: sclera anicteric, neck is supple, he does have a well-healed scar on his left neck.  Lymphatics: no cervical, supraclavicular, or axillary adenopathy  Cardiovascular: regular rate and rhythm, no murmurs, rubs or gallops  Lungs: clear to auscultation bilaterally  Abdomen: soft, nontender, nondistended.  No palpable organomegaly  Extremities: no lower extremity edema  Skin: no rashes, lesions, bruising, or petechiae  Msk:  Shows no weakness of the large muscle groups  Psych: Mood is stable        Lab Results   Component Value Date    GLUCOSE 93 11/16/2020    BUN 20 11/16/2020    CREATININE 1.03 11/16/2020     11/16/2020    K 4.0 11/16/2020     11/16/2020    CO2 27.0 11/16/2020    CALCIUM 9.5 11/16/2020     PROTEINTOT 6.2 11/16/2020    ALBUMIN 4.30 11/16/2020    BILITOT 0.2 11/16/2020    ALKPHOS 68 11/16/2020    AST 25 11/16/2020    ALT 18 11/16/2020       Lab Results   Component Value Date    HGB 12.6 (L) 11/16/2020    HCT 37.9 11/16/2020    MCV 92.4 11/16/2020     11/16/2020    WBC 6.71 11/16/2020       Nm Pet Skull Base To Mid Thigh    Result Date: 11/12/2020  Abnormal activity identified correlating to bulky adenopathy posterior to the left parotid gland suggesting metastatic disease. Direct visualization is recommended at the base of the left neck to exclude possible skin lesion or fold in the skin creating very low-level activity. There is also low-level activity nodule in the left parotid gland. Continued follow-up recommended as indicated.   D:  11/09/2020 E:  11/09/2020  This report was finalized on 11/12/2020 4:07 PM by Dr. Lilia Carmen MD.          Assessment/Plan      1.  Metastatic squamous cell carcinoma with mets to the cervical nodes.  He underwent a resection with most of the disease removed by Dr. Patten.  He does have disease present still.  We reviewed his PET scan which shows no distant disease.  Our plan is to start him on concurrent chemotherapy with cisplatin along with radiation with definitive treatment.  We discussed the side effects of the treatment itself.  We also discussed expectations of his disease.    2.  Chemical recurrence of his prostate cancer with a slowly rising PSA.  His PSA currently is 1.4.  He is undergoing watchful waiting.  He will follow-up with Dr. Love in 6 months with a PSA to decide if androgen ablation therapy would be reasonable.  I have recommended he become more active and fit in the next several months to deal with the sequela of androgen ablation.     Highly complex patient with discussion regarding his squamous cell carcinoma and treatment and side effects.  We also had a long discussion about his prostate cancer today.    Thank you for  allowing me to participate in the care of this patient.    Yours sincerely,    Jesus Manuel Viveros MD  [facility]  Hematology and Oncology    Return on: 12/28/20  Return in (Approximately): 3 weeks    Orders Placed This Encounter   Procedures   • Comprehensive metabolic panel     Standing Status:   Future     Standing Expiration Date:   12/15/2021   • Magnesium     Standing Status:   Future     Standing Expiration Date:   12/15/2021   • Ambulatory Referral for PICC     Referral Priority:   Routine     Referral Type:   Consultation     Number of Visits Requested:   1   • CBC and Differential     Standing Status:   Future     Standing Expiration Date:   12/15/2021     Order Specific Question:   Manual Differential     Answer:   No

## 2020-12-10 ENCOUNTER — OFFICE VISIT (OUTPATIENT)
Dept: ONCOLOGY | Facility: CLINIC | Age: 75
End: 2020-12-10

## 2020-12-10 ENCOUNTER — LAB (OUTPATIENT)
Dept: LAB | Facility: HOSPITAL | Age: 75
End: 2020-12-10

## 2020-12-10 VITALS
DIASTOLIC BLOOD PRESSURE: 85 MMHG | SYSTOLIC BLOOD PRESSURE: 174 MMHG | BODY MASS INDEX: 35.16 KG/M2 | HEART RATE: 92 BPM | WEIGHT: 211 LBS | RESPIRATION RATE: 18 BRPM | OXYGEN SATURATION: 94 % | TEMPERATURE: 97.5 F | HEIGHT: 65 IN

## 2020-12-10 DIAGNOSIS — C44.229 SQUAMOUS CELL CARCINOMA, EAR, LEFT: ICD-10-CM

## 2020-12-10 DIAGNOSIS — C44.229 SQUAMOUS CELL CARCINOMA, EAR, LEFT: Primary | ICD-10-CM

## 2020-12-10 LAB
ALBUMIN SERPL-MCNC: 4 G/DL (ref 3.5–5.2)
ALBUMIN/GLOB SERPL: 1.5 G/DL
ALP SERPL-CCNC: 71 U/L (ref 39–117)
ALT SERPL W P-5'-P-CCNC: 17 U/L (ref 1–41)
ANION GAP SERPL CALCULATED.3IONS-SCNC: 6 MMOL/L (ref 5–15)
AST SERPL-CCNC: 20 U/L (ref 1–40)
BILIRUB SERPL-MCNC: 0.2 MG/DL (ref 0–1.2)
BUN SERPL-MCNC: 20 MG/DL (ref 8–23)
BUN/CREAT SERPL: 20 (ref 7–25)
CALCIUM SPEC-SCNC: 9.4 MG/DL (ref 8.6–10.5)
CHLORIDE SERPL-SCNC: 106 MMOL/L (ref 98–107)
CO2 SERPL-SCNC: 28 MMOL/L (ref 22–29)
CREAT SERPL-MCNC: 1 MG/DL (ref 0.76–1.27)
ERYTHROCYTE [DISTWIDTH] IN BLOOD BY AUTOMATED COUNT: 13.3 % (ref 12.3–15.4)
GFR SERPL CREATININE-BSD FRML MDRD: 73 ML/MIN/1.73
GLOBULIN UR ELPH-MCNC: 2.6 GM/DL
GLUCOSE SERPL-MCNC: 91 MG/DL (ref 65–99)
HCT VFR BLD AUTO: 37 % (ref 37.5–51)
HGB BLD-MCNC: 12 G/DL (ref 13–17.7)
LYMPHOCYTES # BLD AUTO: 1.6 10*3/MM3 (ref 0.7–3.1)
LYMPHOCYTES NFR BLD AUTO: 22 % (ref 19.6–45.3)
MAGNESIUM SERPL-MCNC: 1.9 MG/DL (ref 1.6–2.4)
MCH RBC QN AUTO: 30.3 PG (ref 26.6–33)
MCHC RBC AUTO-ENTMCNC: 32.5 G/DL (ref 31.5–35.7)
MCV RBC AUTO: 93 FL (ref 79–97)
MONOCYTES # BLD AUTO: 0.5 10*3/MM3 (ref 0.1–0.9)
MONOCYTES NFR BLD AUTO: 6.7 % (ref 5–12)
NEUTROPHILS NFR BLD AUTO: 5.1 10*3/MM3 (ref 1.7–7)
NEUTROPHILS NFR BLD AUTO: 71.3 % (ref 42.7–76)
PLATELET # BLD AUTO: 280 10*3/MM3 (ref 140–450)
PMV BLD AUTO: 6 FL (ref 6–12)
POTASSIUM SERPL-SCNC: 4.4 MMOL/L (ref 3.5–5.2)
PROT SERPL-MCNC: 6.6 G/DL (ref 6–8.5)
RBC # BLD AUTO: 3.98 10*6/MM3 (ref 4.14–5.8)
SODIUM SERPL-SCNC: 140 MMOL/L (ref 136–145)
WBC # BLD AUTO: 7.2 10*3/MM3 (ref 3.4–10.8)

## 2020-12-10 PROCEDURE — 80053 COMPREHEN METABOLIC PANEL: CPT

## 2020-12-10 PROCEDURE — 85025 COMPLETE CBC W/AUTO DIFF WBC: CPT

## 2020-12-10 PROCEDURE — 99215 OFFICE O/P EST HI 40 MIN: CPT | Performed by: NURSE PRACTITIONER

## 2020-12-10 PROCEDURE — 36415 COLL VENOUS BLD VENIPUNCTURE: CPT

## 2020-12-10 PROCEDURE — 83735 ASSAY OF MAGNESIUM: CPT

## 2020-12-10 NOTE — PROGRESS NOTES
CHEMOTHERAPY PREPARATION    Ajith Gallego  [id number]  1945    Chief Complaint: chemo education     History of present illness:  Ajith Gallego is a 75 y.o. year old male who is here today for chemotherapy preparation and needs assessment. 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     Oncology History:    Oncology/Hematology History   Squamous cell carcinoma, ear, left   11/18/2020 Initial Diagnosis    Squamous cell carcinoma, ear, left     11/19/2020 Cancer Staged    Staging form: Cutaneous Carcinoma Of The Head And Neck, AJCC 8th Edition  - Clinical stage from 11/19/2020: Stage IV (cT1, cN2b(U), cM0) - Signed by Juan Francisco Francois MD on 12/3/2020     12/15/2020 -  Chemotherapy    OP HEAD & NECK CISplatin (weekly) + XRT         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   • Prostate cancer (CMS/HCC)    • Skin cancer    • Tattoo    • Wears glasses        Past Surgical History:   Procedure Laterality Date   • CATARACT EXTRACTION, BILATERAL     • NECK DISSECTION Left 11/18/2020    Procedure: MODIFIED RADICAL NECK DISSECTION LEFT;  Surgeon: Adams Patten MD;  Location: [facility];  Service: ENT;  Laterality: Left;   • OTHER SURGICAL HISTORY      left ear skin cancer removed   • PACEMAKER IMPLANTATION     • PAROTIDECTOMY Left 11/18/2020    Procedure: PAROTIDECTOMY WITH FACIAL NERVE DISSECTION LEFT;  Surgeon: Adams Patten MD;  Location: [facility] OR;  Service: ENT;  Laterality: Left;   • PROSTATECTOMY         Family History   Problem Relation Age of Onset   • Kidney failure Mother    • Leukemia Father    • Lymphoma Sister        Medications: The current medication list was reviewed and reconciled.     Allergies:  is allergic to  penicillins.    Review of Systems:    Review of Systems   Constitutional: Negative for appetite change, fatigue, fever and unexpected weight change.   HENT: Negative for mouth sores, sore throat and trouble swallowing.    Respiratory: Negative for cough, shortness of breath and wheezing.    Cardiovascular: Negative for chest pain, palpitations and leg swelling.   Gastrointestinal: Negative for abdominal distention, abdominal pain, constipation, diarrhea, nausea and vomiting.   Genitourinary: Negative for difficulty urinating, dysuria and frequency.   Musculoskeletal: Negative for arthralgias.   Skin: Negative for pallor, rash and wound.   Neurological: Negative for dizziness and weakness.   Hematological: Does not bruise/bleed easily.   Psychiatric/Behavioral: Negative for confusion and sleep disturbance. The patient is not nervous/anxious.        Physical Exam:    Vitals:    12/10/20 1049   BP: 174/85   Pulse: 92   Resp: 18   Temp: 97.5 °F (36.4 °C)   SpO2: 94%     Vitals:    12/10/20 1049   PainSc: 0-No pain          ECOG: (0) Fully Active - Able to Carry On All Pre-disease Performance Without Restriction    General: well appearing, in no acute distress  HEENT: sclera anicteric, oropharynx clear, neck is supple, healing surgical incision left side of neck  Lymphatics: no cervical, supraclavicular, or axillary adenopathy  Cardiovascular: regular rate and rhythm, no murmurs, rubs or gallops  Lungs: clear to auscultation bilaterally  Abdomen: soft, nontender, nondistended.  No palpable organomegaly  Extremities: no lower extremity edema  Skin: no rashes, lesions, bruising, or petechiae  Msk:  Shows no weakness of the large muscle groups  Psych: Mood is stable            NEEDS ASSESSMENTS    Genetics  The patient's new diagnosis and family history have been reviewed for genetic counseling needs. A genetic referral is not recommended.     Psychosocial  The patient has completed a PHQ-9 Depression Screening and the  "Distress Thermometer (DT) today.   PHQ-9 results show 0 (No Depression). The patient scored their distress today as 0 on a scale of 0-10 with 0 being no distress and 10 being extreme distress.   Problems marked by the patient as being an issue for them within the last week include no problems.   Results were reviewed along with psychosocial resources offered by our cancer center. Our oncology social worker will be flagged for a DT score of 4 or above, and a same day call will be made for a score of 9 or 10. A mental health referral is not recommended at this time. The patient is not accepting of a referral to [facility].   Copies of patient's questionnaires will be scanned into EMR for details and further reference.    Barriers to care  A barriers form was also completed by the patient today. We discussed services offered by our facility to help him have adequate access to care. The patient was given the name and card for our Oncology Social Worker, Nely Waddell. Based upon barriers assessment today, the patient will not require a follow-up call from the  to further discuss needs.   A copy of the barriers form will also be scanned into EMR for details and further reference.     VAD Assessment  The patient and I discussed planned intervenous chemotherapy as well as other IV treatments that are often needed throughout the course of treatment. These may include, but are not limited to blood transfusions, antibiotics, and IV hydration. Plan is for PICC line placement prior to starting treatment.      Advanced Care Planning  The patient and I discussed advanced care planning, \"Conversations that Matter\".   This service was offered, free of charge, for development of advance directives with a certified ACP facilitator.  The patient does not have an up-to-date advanced directive. The patient is not interested in an appointment with one of our facilitators to create or update their advanced " directives.      Palliative Care  The patient and I discussed palliative care services. Palliative care is not the same as Hospice care. This is specialized medical care for people living with serious illness with the goal of improving quality of life for the patient and their family. [facility] has partnered with [facility] to offer our patients outpatient palliative care early along with their treatment to assist in coordination of care, symptom management, pain management, and medical decision making.  Oncology criteria for palliative care referral is not met at this time. The patient is not interested in a palliative care consultation.     Additional Referral needs  none      CHEMOTHERAPY EDUCATION    Booklets Given: Chemotherapy and You [x]  Eating Hints [x]    Sexuality/Fertility Books []      Chemotherapy/Biotherapy Education Sheets: (list all that apply)  nausea management, acid reflux management, diarrhea management, Cancer resourse contacts information, skin and mouth care and vaccination information                                                                                                                                                                 Chemotherapy Regimen:   Treatment Plans     Name Type Plan Dates Plan Provider         Active    OP HEAD & NECK CISplatin (weekly) + XRT ONCOLOGY TREATMENT  12/13/2020 - Present Jesus Manuel Viveros MD                    TOPICS EDUCATION PROVIDED COMMENTS   ANEMIA:  role of RBC, cause, s/s, ways to manage, role of transfusion [x]    THROMBOCYTOPENIA:  role of platelet, cause, s/s, ways to prevent bleeding, things to avoid, when to seek help [x]    NEUTROPENIA:  role of WBC, cause, infection precautions, s/s of infection, when to call MD [x]    NUTRITION & APPETITE CHANGES:  importance of maintaining healthy diet & weight, ways to manage to improve intake, dietary consult, exercise regimen [x]    DIARRHEA:  causes, s/s of dehydration, ways to  manage, dietary changes, when to call MD [x]    CONSTIPATION:  causes, ways to manage, dietary changes, when to call MD [x]    NAUSEA & VOMITING:  cause, use of antiemetics, dietary changes, when to call MD [x]    MOUTH SORES:  causes, oral care, ways to manage [x]    ALOPECIA:  cause, ways to manage, resources [x]    INFERTILITY & SEXUALITY:  causes, fertility preservation options, sexuality changes, ways to manage, importance of birth control [x]    NERVOUS SYSTEM CHANGES:  causes, s/s, neuropathies, cognitive changes, ways to manage [x]    PAIN:  causes, ways to manage [x]    SKIN & NAIL CHANGES:  cause, s/s, ways to manage [x]    ORGAN TOXICITIES:  cause, s/s, need for diagnostic tests, labs, when to notify MD [x]    SURVIVORSHIP:  distress, distress assessment, secondary malignancies, early/late effects, follow-up, social issues, social support [x]    HOME CARE:  use of spill kits, storing of PO chemo, how to manage bodily fluids [x]    MISCELLANEOUS:  drug interactions, administration, vesicant, et [x]        Assessment and Plan:    Diagnoses and all orders for this visit:    1. Squamous cell carcinoma, ear, left (Primary)      This was a 45 minute face-to-face visit with 40 minutes spent in  counseling and coordination of care as documented above.   The patient and I have reviewed their new cancer diagnosis and scheduled treatment plan. Needs assessment was completed including genetics, psychosocial needs, barriers to care, VAD evaluation, advanced care planning, and palliative care services. Referrals have been ordered as appropriate based upon our evaluation and patient desires.     Chemotherapy teaching was also completed today as documented above. Adequate time was given to answer all questions to his satisfaction. Patient and family are aware of their care team members and contact information if they have questions or problems throughout the treatment course. Needs assessments and education has been  completed. The patient is adequately prepared to begin treatment as scheduled.     Reviewed with patient education regarding dexamethasone and Zofran prescriptions sent to pharmacy.       I advised the patient that she can take Tylenol or Ibuprofen as needed for aches/pains related to cancer/treatment. I also advised patient she could use Senakot or Miralax as needed for constipation or Imodium as needed for diarrhea.       I reviewed with the patient the care team members. I also reviewed the option of the urgent care clinic through our oncology office for evaluation and management of symptoms related to treatment.    Patient will have labs drawn today in anticipation of starting treatment on 12/15/2020.    Shantel Stapleton, [facility]  12/10/2020

## 2020-12-14 ENCOUNTER — HOSPITAL ENCOUNTER (OUTPATIENT)
Dept: SPEECH THERAPY | Facility: HOSPITAL | Age: 75
Setting detail: THERAPIES SERIES
Discharge: HOME | End: 2020-12-14

## 2020-12-14 ENCOUNTER — APPOINTMENT (OUTPATIENT)
Dept: INFUSION THERAPY | Facility: HOSPITAL | Age: 75
End: 2020-12-14

## 2020-12-14 ENCOUNTER — HOSPITAL ENCOUNTER (OUTPATIENT)
Dept: INFUSION THERAPY | Facility: HOSPITAL | Age: 75
Discharge: HOME | End: 2020-12-14
Admitting: INTERNAL MEDICINE

## 2020-12-14 VITALS
OXYGEN SATURATION: 98 % | DIASTOLIC BLOOD PRESSURE: 82 MMHG | TEMPERATURE: 98.2 F | RESPIRATION RATE: 16 BRPM | HEART RATE: 59 BPM | SYSTOLIC BLOOD PRESSURE: 161 MMHG

## 2020-12-14 DIAGNOSIS — C44.229 SQUAMOUS CELL CARCINOMA, EAR, LEFT: Primary | ICD-10-CM

## 2020-12-14 DIAGNOSIS — C44.229 SQUAMOUS CELL CARCINOMA, EAR, LEFT: ICD-10-CM

## 2020-12-14 PROCEDURE — C1751 CATH, INF, PER/CENT/MIDLINE: HCPCS

## 2020-12-14 PROCEDURE — 92610 EVALUATE SWALLOWING FUNCTION: CPT

## 2020-12-14 PROCEDURE — C1894 INTRO/SHEATH, NON-LASER: HCPCS

## 2020-12-14 RX ORDER — SODIUM CHLORIDE 0.9 % (FLUSH) 0.9 %
10 SYRINGE (ML) INJECTION EVERY 12 HOURS SCHEDULED
Status: DISCONTINUED | OUTPATIENT
Start: 2020-12-14 | End: 2020-12-16 | Stop reason: HOSPADM

## 2020-12-14 RX ORDER — SODIUM CHLORIDE 0.9 % (FLUSH) 0.9 %
10 SYRINGE (ML) INJECTION AS NEEDED
Status: DISCONTINUED | OUTPATIENT
Start: 2020-12-14 | End: 2020-12-16 | Stop reason: HOSPADM

## 2020-12-14 NOTE — NURSING NOTE
Arrived for PICC placement. Pleasant and cooperative. Denies c/o's at present.    1450- PICC placed  per  upper arm, occlusive dressing intact. Pt denies c/o's. Pt d/c'd to home.

## 2020-12-16 PROCEDURE — 77338 DESIGN MLC DEVICE FOR IMRT: CPT | Performed by: RADIOLOGY

## 2020-12-16 PROCEDURE — 77301 RADIOTHERAPY DOSE PLAN IMRT: CPT | Performed by: RADIOLOGY

## 2020-12-16 PROCEDURE — 77300 RADIATION THERAPY DOSE PLAN: CPT | Performed by: RADIOLOGY

## 2020-12-18 ENCOUNTER — EDUCATION (OUTPATIENT)
Dept: ONCOLOGY | Facility: HOSPITAL | Age: 75
End: 2020-12-18

## 2020-12-18 ENCOUNTER — HOSPITAL ENCOUNTER (OUTPATIENT)
Dept: ONCOLOGY | Facility: HOSPITAL | Age: 75
Setting detail: INFUSION SERIES
Discharge: HOME | End: 2020-12-18

## 2020-12-18 VITALS
DIASTOLIC BLOOD PRESSURE: 77 MMHG | WEIGHT: 208 LBS | HEART RATE: 75 BPM | BODY MASS INDEX: 34.66 KG/M2 | SYSTOLIC BLOOD PRESSURE: 165 MMHG | TEMPERATURE: 97.7 F | RESPIRATION RATE: 18 BRPM | HEIGHT: 65 IN

## 2020-12-18 DIAGNOSIS — C44.229 SQUAMOUS CELL CARCINOMA, EAR, LEFT: ICD-10-CM

## 2020-12-18 DIAGNOSIS — C44.229 SQUAMOUS CELL CARCINOMA, EAR, LEFT: Primary | ICD-10-CM

## 2020-12-18 PROCEDURE — 96366 THER/PROPH/DIAG IV INF ADDON: CPT

## 2020-12-18 PROCEDURE — 25010000002 PALONOSETRON 0.25 MG/5ML SOLUTION PREFILLED SYRINGE: Performed by: INTERNAL MEDICINE

## 2020-12-18 PROCEDURE — 25010000002 CISPLATIN PER 50 MG: Performed by: INTERNAL MEDICINE

## 2020-12-18 PROCEDURE — 25010000002 FOSAPREPITANT PER 1 MG: Performed by: INTERNAL MEDICINE

## 2020-12-18 PROCEDURE — 96375 TX/PRO/DX INJ NEW DRUG ADDON: CPT

## 2020-12-18 PROCEDURE — 25010000002 MAGNESIUM SULFATE PER 500 MG OF MAGNESIUM: Performed by: INTERNAL MEDICINE

## 2020-12-18 PROCEDURE — 96413 CHEMO IV INFUSION 1 HR: CPT

## 2020-12-18 PROCEDURE — 25010000002 POTASSIUM CHLORIDE PER 2 MEQ OF POTASSIUM: Performed by: INTERNAL MEDICINE

## 2020-12-18 PROCEDURE — 25010000002 DEXAMETHASONE SODIUM PHOSPHATE 100 MG/10ML SOLUTION: Performed by: INTERNAL MEDICINE

## 2020-12-18 PROCEDURE — 96367 TX/PROPH/DG ADDL SEQ IV INF: CPT

## 2020-12-18 PROCEDURE — 96376 TX/PRO/DX INJ SAME DRUG ADON: CPT

## 2020-12-18 RX ORDER — SODIUM CHLORIDE 9 MG/ML
250 INJECTION, SOLUTION INTRAVENOUS ONCE
Status: COMPLETED | OUTPATIENT
Start: 2020-12-18 | End: 2020-12-18

## 2020-12-18 RX ORDER — DEXAMETHASONE 4 MG/1
TABLET ORAL
Qty: 30 TABLET | Refills: 5 | Status: SHIPPED | OUTPATIENT
Start: 2020-12-18 | End: 2021-03-05

## 2020-12-18 RX ORDER — PALONOSETRON 0.05 MG/ML
0.25 INJECTION, SOLUTION INTRAVENOUS ONCE
Status: COMPLETED | OUTPATIENT
Start: 2020-12-18 | End: 2020-12-18

## 2020-12-18 RX ADMIN — POTASSIUM CHLORIDE 500 ML: 2 INJECTION, SOLUTION, CONCENTRATE INTRAVENOUS at 10:10

## 2020-12-18 RX ADMIN — DEXAMETHASONE SODIUM PHOSPHATE 12 MG: 10 INJECTION, SOLUTION INTRAMUSCULAR; INTRAVENOUS at 11:14

## 2020-12-18 RX ADMIN — PALONOSETRON HYDROCHLORIDE 0.25 MG: 0.05 INJECTION, SOLUTION INTRAVENOUS at 11:12

## 2020-12-18 RX ADMIN — CISPLATIN 82 MG: 1 INJECTION INTRAVENOUS at 11:48

## 2020-12-18 RX ADMIN — SODIUM CHLORIDE 150 MG: 9 INJECTION, SOLUTION INTRAVENOUS at 11:15

## 2020-12-18 RX ADMIN — POTASSIUM CHLORIDE 500 ML: 2 INJECTION, SOLUTION, CONCENTRATE INTRAVENOUS at 13:00

## 2020-12-18 RX ADMIN — SODIUM CHLORIDE 250 ML: 9 INJECTION, SOLUTION INTRAVENOUS at 11:12

## 2020-12-18 NOTE — PLAN OF CARE
[facility] • [address]  • [address] • [place] [address] • [phone]      CHEMOTHERAPY EDUCATION SHEET    NAME:  Ajith Gallego      : 1945           DATE: 20    Booklets Given: Chemotherapy and You []  Eating Hints []    Sexuality/Fertility Books []     Chemotherapy/Biotherapy Education Sheets: (list all that apply)  Cisplatin                                                                                                                                                                Chemotherapy Regimen: Cisplatin IV infused on day 1 of each 7 day cycle    TOPICS EDUCATION PROVIDED EDUCATION REINFORCED COMMENTS   ANEMIA:  role of RBC, cause, s/s, ways to manage, role of transfusion [x] [] Discussed the role of red blood cells in our body as well as the signs and symptoms of anemia, such as fatigue and weakness.   THROMBOCYTOPENIA:  role of platelet, cause, s/s, ways to prevent bleeding, things to avoid, when to seek help [x] [] Discussed the role of platelets in our body and the potential for decreased platelet counts with therapy which could lead to an increased risk of bleeding.   NEUTROPENIA:  role of WBC, cause, infection precautions, s/s of infection, when to call MD [x] [] Discussed the role of white blood cells and how they help to decrease infection in our body, and how these are often decreased with chemotherapy. Discussed the importance of hand hygiene, avoiding large crowds and people that could possibly be sick. Instructed patient to call MD if temperature reaches 100.4.    NUTRITION & APPETITE CHANGES:  importance of maintaining healthy diet & weight, ways to manage to improve intake, dietary consult, exercise regimen [x] [] Discussed risk of decreased appetite, reviewed plan for small more frequent meals.   DIARRHEA:  causes, s/s of dehydration, ways to manage, dietary changes, when to call MD [x] [] Discussed the possibility of diarrhea while on therapy. Instructed patient  that they can use OTC loperamide, but to contact MD if they find no relief in 24 hours.   CONSTIPATION:  causes, ways to manage, dietary changes, when to call MD [x] [] Discussed risk of constipation associated with antiemetic regimen. Reviewed over the counter use of stool softeners and stimulants as needed should constipation present as a problem.   NAUSEA & VOMITING:  cause, use of antiemetics, dietary changes, when to call MD [x] [] Reviewed risk of Nausea and vomiting, counseled on prn ondansetron and to call MD if taking the max dose and unrelieved.   Reviewed use of Dexamethasone scheduled on Days 2-4 of each cycle to prevent delayed N/V   MOUTH SORES:  causes, oral care, ways to manage [] []    ALOPECIA:  cause, ways to manage, resources [] []    INFERTILITY & SEXUALITY:  causes, fertility preservation options, sexuality changes, ways to manage, importance of birth control [] []    NERVOUS SYSTEM CHANGES:  causes, s/s, neuropathies, cognitive changes, ways to manage [x] [] Discussed the potential for neuropathy while on this therapy. Patient educated on what to expect and when to call MD.   PAIN:  causes, ways to manage [] [] ????   SKIN & NAIL CHANGES:  cause, s/s, ways to manage [] []    ORGAN TOXICITIES:  cause, s/s, need for diagnostic tests, labs, when to notify MD [x] [] Discussed the potential for renal toxicities. Informed patient that we would be monitoring patients organ function when we take their labs.   SURVIVORSHIP:  distress, distress assessment, secondary malignancies, early/late effects, follow-up, social issues, social support [] []    HOME CARE:  use of spill kits, storing of PO chemo, how to manage bodily fluids [x] [] Counseled on management of soiled linens and proper flush technique.  Discussed how to manage all the side effects at home and advised when to contact the MD office.    MISCELLANEOUS:  drug interactions, administration, vesicant, et [x] [] DDI: No DDI to discuss. Discussed  frequency and length of each infusion medication.     Referrals:    N/A    Notes:   Patient really appreciated the calendar and stated that it would help to keep everything organized. Patient asked really good questions and seemed comfortable with this therapy. Discussed above material with patient in the infusion center. All questions and concerns were addressed. Patient was provided with a personalized calendar, written educational materials, and Quiana Doss's card. Instructed patient to call should they have any additional questions

## 2020-12-21 ENCOUNTER — HOSPITAL ENCOUNTER (OUTPATIENT)
Dept: RADIATION ONCOLOGY | Facility: HOSPITAL | Age: 75
Discharge: HOME | End: 2020-12-21

## 2020-12-21 PROCEDURE — 77386: CPT | Performed by: RADIOLOGY

## 2020-12-22 ENCOUNTER — DOCUMENTATION (OUTPATIENT)
Dept: NUTRITION | Facility: HOSPITAL | Age: 75
End: 2020-12-22

## 2020-12-22 ENCOUNTER — HOSPITAL ENCOUNTER (OUTPATIENT)
Dept: RADIATION ONCOLOGY | Facility: HOSPITAL | Age: 75
Discharge: HOME | End: 2020-12-22

## 2020-12-22 ENCOUNTER — HOSPITAL ENCOUNTER (OUTPATIENT)
Dept: ONCOLOGY | Facility: HOSPITAL | Age: 75
Setting detail: INFUSION SERIES
Discharge: HOME | End: 2020-12-22

## 2020-12-22 VITALS
RESPIRATION RATE: 18 BRPM | BODY MASS INDEX: 34.16 KG/M2 | SYSTOLIC BLOOD PRESSURE: 135 MMHG | DIASTOLIC BLOOD PRESSURE: 73 MMHG | HEART RATE: 84 BPM | WEIGHT: 205 LBS | HEIGHT: 65 IN | TEMPERATURE: 97.5 F

## 2020-12-22 VITALS — WEIGHT: 206.8 LBS | BODY MASS INDEX: 34.41 KG/M2

## 2020-12-22 DIAGNOSIS — C44.229 SQUAMOUS CELL CARCINOMA, EAR, LEFT: Primary | ICD-10-CM

## 2020-12-22 PROCEDURE — 77386: CPT | Performed by: RADIOLOGY

## 2020-12-22 RX ORDER — PALONOSETRON 0.05 MG/ML
0.25 INJECTION, SOLUTION INTRAVENOUS ONCE
Status: CANCELLED | OUTPATIENT
Start: 2020-12-23

## 2020-12-22 RX ORDER — SODIUM CHLORIDE 9 MG/ML
250 INJECTION, SOLUTION INTRAVENOUS ONCE
Status: CANCELLED | OUTPATIENT
Start: 2020-12-23

## 2020-12-22 NOTE — PROGRESS NOTES
Pt came to be treated today, but it was too early to treat since last treatment was 12/18/20.  Pt will return tomorrow 12/23/20 for treatment per Dr Snow.

## 2020-12-22 NOTE — PROGRESS NOTES
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  Subsequently he developed adenopathy in his neck especially around his parotid.  He underwent left       superficial parotidectomy and left neck dissection  with Dr. Patten   Chemotherapy: Concurrent with radiation / Cisplatin / 7 weekly cycles   Radiation: 66 Gray in 33 fractions using IMRT to spare the remaining oral cavity and     uninvolved salivary glands    Weight 205 lbs    Patient denies any nutritional impact symptoms related to chemo treatment on Friday.  He continues with good appetite and eating well; stressed importance of focus on hydration.  Will continue to follow.

## 2020-12-23 ENCOUNTER — HOSPITAL ENCOUNTER (OUTPATIENT)
Dept: ONCOLOGY | Facility: HOSPITAL | Age: 75
Setting detail: INFUSION SERIES
Discharge: HOME | End: 2020-12-23

## 2020-12-23 ENCOUNTER — HOSPITAL ENCOUNTER (OUTPATIENT)
Dept: RADIATION ONCOLOGY | Facility: HOSPITAL | Age: 75
Discharge: HOME | End: 2020-12-23

## 2020-12-23 VITALS
WEIGHT: 206 LBS | SYSTOLIC BLOOD PRESSURE: 138 MMHG | RESPIRATION RATE: 20 BRPM | HEIGHT: 65 IN | BODY MASS INDEX: 34.32 KG/M2 | TEMPERATURE: 97.8 F | HEART RATE: 73 BPM | DIASTOLIC BLOOD PRESSURE: 69 MMHG

## 2020-12-23 DIAGNOSIS — C44.229 SQUAMOUS CELL CARCINOMA, EAR, LEFT: Primary | ICD-10-CM

## 2020-12-23 LAB
ANION GAP SERPL CALCULATED.3IONS-SCNC: 8 MMOL/L (ref 5–15)
BUN SERPL-MCNC: 22 MG/DL (ref 8–23)
BUN/CREAT SERPL: 26.5 (ref 7–25)
CALCIUM SPEC-SCNC: 8.4 MG/DL (ref 8.6–10.5)
CHLORIDE SERPL-SCNC: 106 MMOL/L (ref 98–107)
CO2 SERPL-SCNC: 23 MMOL/L (ref 22–29)
CREAT BLDA-MCNC: 0.8 MG/DL
CREAT BLDA-MCNC: 0.8 MG/DL (ref 0.6–1.3)
CREAT SERPL-MCNC: 0.83 MG/DL (ref 0.76–1.27)
ERYTHROCYTE [DISTWIDTH] IN BLOOD BY AUTOMATED COUNT: 14.1 % (ref 12.3–15.4)
GFR SERPL CREATININE-BSD FRML MDRD: 90 ML/MIN/1.73
GLUCOSE SERPL-MCNC: 76 MG/DL (ref 65–99)
HCT VFR BLD AUTO: 37.2 % (ref 37.5–51)
HGB BLD-MCNC: 12.1 G/DL (ref 13–17.7)
LYMPHOCYTES # BLD AUTO: 1.2 10*3/MM3 (ref 0.7–3.1)
LYMPHOCYTES NFR BLD AUTO: 15.4 % (ref 19.6–45.3)
MAGNESIUM SERPL-MCNC: 1.7 MG/DL (ref 1.6–2.4)
MCH RBC QN AUTO: 30.3 PG (ref 26.6–33)
MCHC RBC AUTO-ENTMCNC: 32.6 G/DL (ref 31.5–35.7)
MCV RBC AUTO: 92.8 FL (ref 79–97)
MONOCYTES # BLD AUTO: 0.5 10*3/MM3 (ref 0.1–0.9)
MONOCYTES NFR BLD AUTO: 5.6 % (ref 5–12)
NEUTROPHILS NFR BLD AUTO: 6.4 10*3/MM3 (ref 1.7–7)
NEUTROPHILS NFR BLD AUTO: 79 % (ref 42.7–76)
PLATELET # BLD AUTO: 145 10*3/MM3 (ref 140–450)
PMV BLD AUTO: 6.7 FL (ref 6–12)
POTASSIUM SERPL-SCNC: 3.8 MMOL/L (ref 3.5–5.2)
RBC # BLD AUTO: 4 10*6/MM3 (ref 4.14–5.8)
SODIUM SERPL-SCNC: 137 MMOL/L (ref 136–145)
WBC # BLD AUTO: 8.1 10*3/MM3 (ref 3.4–10.8)

## 2020-12-23 PROCEDURE — 80048 BASIC METABOLIC PNL TOTAL CA: CPT | Performed by: NURSE PRACTITIONER

## 2020-12-23 PROCEDURE — 83735 ASSAY OF MAGNESIUM: CPT | Performed by: NURSE PRACTITIONER

## 2020-12-23 PROCEDURE — 77386: CPT | Performed by: RADIOLOGY

## 2020-12-23 PROCEDURE — 96366 THER/PROPH/DIAG IV INF ADDON: CPT

## 2020-12-23 PROCEDURE — 96375 TX/PRO/DX INJ NEW DRUG ADDON: CPT

## 2020-12-23 PROCEDURE — 25010000002 DEXAMETHASONE SODIUM PHOSPHATE 100 MG/10ML SOLUTION: Performed by: NURSE PRACTITIONER

## 2020-12-23 PROCEDURE — 25010000002 FOSAPREPITANT PER 1 MG: Performed by: NURSE PRACTITIONER

## 2020-12-23 PROCEDURE — 25010000002 PALONOSETRON 0.25 MG/5ML SOLUTION PREFILLED SYRINGE: Performed by: NURSE PRACTITIONER

## 2020-12-23 PROCEDURE — 85025 COMPLETE CBC W/AUTO DIFF WBC: CPT | Performed by: NURSE PRACTITIONER

## 2020-12-23 PROCEDURE — 96360 HYDRATION IV INFUSION INIT: CPT

## 2020-12-23 PROCEDURE — 96367 TX/PROPH/DG ADDL SEQ IV INF: CPT

## 2020-12-23 PROCEDURE — 96361 HYDRATE IV INFUSION ADD-ON: CPT

## 2020-12-23 PROCEDURE — 25010000002 POTASSIUM CHLORIDE PER 2 MEQ OF POTASSIUM: Performed by: NURSE PRACTITIONER

## 2020-12-23 PROCEDURE — 77336 RADIATION PHYSICS CONSULT: CPT | Performed by: RADIOLOGY

## 2020-12-23 PROCEDURE — 25010000002 MAGNESIUM SULFATE PER 500 MG OF MAGNESIUM: Performed by: NURSE PRACTITIONER

## 2020-12-23 PROCEDURE — 96413 CHEMO IV INFUSION 1 HR: CPT

## 2020-12-23 PROCEDURE — 25010000002 CISPLATIN PER 50 MG: Performed by: NURSE PRACTITIONER

## 2020-12-23 PROCEDURE — 82565 ASSAY OF CREATININE: CPT

## 2020-12-23 RX ORDER — PALONOSETRON 0.05 MG/ML
0.25 INJECTION, SOLUTION INTRAVENOUS ONCE
Status: COMPLETED | OUTPATIENT
Start: 2020-12-23 | End: 2020-12-23

## 2020-12-23 RX ORDER — SODIUM CHLORIDE 9 MG/ML
250 INJECTION, SOLUTION INTRAVENOUS ONCE
Status: COMPLETED | OUTPATIENT
Start: 2020-12-23 | End: 2020-12-23

## 2020-12-23 RX ADMIN — SODIUM CHLORIDE 250 ML: 9 INJECTION, SOLUTION INTRAVENOUS at 09:14

## 2020-12-23 RX ADMIN — POTASSIUM CHLORIDE 500 ML: 2 INJECTION, SOLUTION, CONCENTRATE INTRAVENOUS at 11:03

## 2020-12-23 RX ADMIN — CISPLATIN 82 MG: 1 INJECTION INTRAVENOUS at 09:55

## 2020-12-23 RX ADMIN — DEXAMETHASONE SODIUM PHOSPHATE 12 MG: 10 INJECTION, SOLUTION INTRAMUSCULAR; INTRAVENOUS at 09:16

## 2020-12-23 RX ADMIN — SODIUM CHLORIDE 150 MG: 9 INJECTION, SOLUTION INTRAVENOUS at 09:14

## 2020-12-23 RX ADMIN — POTASSIUM CHLORIDE 500 ML: 2 INJECTION, SOLUTION, CONCENTRATE INTRAVENOUS at 08:00

## 2020-12-23 RX ADMIN — PALONOSETRON HYDROCHLORIDE 0.25 MG: 0.05 INJECTION, SOLUTION INTRAVENOUS at 09:13

## 2020-12-24 ENCOUNTER — HOSPITAL ENCOUNTER (OUTPATIENT)
Dept: RADIATION ONCOLOGY | Facility: HOSPITAL | Age: 75
Discharge: HOME | End: 2020-12-24

## 2020-12-24 PROCEDURE — 77386: CPT | Performed by: RADIOLOGY

## 2020-12-28 ENCOUNTER — OFFICE VISIT (OUTPATIENT)
Dept: ONCOLOGY | Facility: CLINIC | Age: 75
End: 2020-12-28

## 2020-12-28 ENCOUNTER — HOSPITAL ENCOUNTER (OUTPATIENT)
Dept: RADIATION ONCOLOGY | Facility: HOSPITAL | Age: 75
Discharge: HOME | End: 2020-12-28

## 2020-12-28 VITALS
BODY MASS INDEX: 34.72 KG/M2 | DIASTOLIC BLOOD PRESSURE: 79 MMHG | HEIGHT: 65 IN | RESPIRATION RATE: 16 BRPM | WEIGHT: 208.4 LBS | SYSTOLIC BLOOD PRESSURE: 135 MMHG | TEMPERATURE: 98 F | HEART RATE: 91 BPM | OXYGEN SATURATION: 99 %

## 2020-12-28 DIAGNOSIS — C44.229 SQUAMOUS CELL CARCINOMA, EAR, LEFT: Primary | ICD-10-CM

## 2020-12-28 PROCEDURE — 99214 OFFICE O/P EST MOD 30 MIN: CPT | Performed by: INTERNAL MEDICINE

## 2020-12-28 PROCEDURE — 77386: CPT | Performed by: RADIOLOGY

## 2020-12-28 RX ORDER — SODIUM CHLORIDE 9 MG/ML
250 INJECTION, SOLUTION INTRAVENOUS ONCE
Status: CANCELLED | OUTPATIENT
Start: 2021-01-06

## 2020-12-28 RX ORDER — PALONOSETRON 0.05 MG/ML
0.25 INJECTION, SOLUTION INTRAVENOUS ONCE
Status: CANCELLED | OUTPATIENT
Start: 2021-01-06

## 2020-12-28 RX ORDER — PALONOSETRON 0.05 MG/ML
0.25 INJECTION, SOLUTION INTRAVENOUS ONCE
Status: CANCELLED | OUTPATIENT
Start: 2020-12-30

## 2020-12-28 RX ORDER — SODIUM CHLORIDE 9 MG/ML
250 INJECTION, SOLUTION INTRAVENOUS ONCE
Status: CANCELLED | OUTPATIENT
Start: 2020-12-30

## 2020-12-28 NOTE — PROGRESS NOTES
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  male presents today for follow-up of metastatic squamous cell of the skin.  He is currently on cisplatin weekly with concurrent radiation due to unresectable disease.  He is doing reasonably well from the treatment.  Denies any issues with nausea vomiting.  Has some taste changes.  No recent infections.    Past medical history, social history and family history was reviewed and unchanged from prior visit.    Review of Systems:    Review of Systems - Oncology         Medications:        Current Outpatient Medications:   •  aspirin 81 MG EC tablet, Take 81 mg by mouth Daily., Disp: , Rfl:   •  bacitracin-polymyxin b (POLYSPORIN) 500-02731 UNIT/GM ointment, bacitracin zinc 500 unit-polymyxin B 10,000 unit/gram topical ointment  apply a small amount to affected area twice daily, Disp: , Rfl:   •  Bee Pollen 500 MG chewable tablet, Chew., Disp: , Rfl:   •  dexamethasone (DECADRON) 4 MG tablet, Take 2 tablets in the morning daily on days 2, 3 & 4.  Take with food., Disp: 30 tablet, Rfl: 5  •  HYDROcodone-acetaminophen (Norco) 7.5-325 MG per tablet, Every 6 (Six) Hours., Disp: , Rfl:   •  lisinopril (PRINIVIL,ZESTRIL) 10 MG tablet, Take 10 mg by mouth Daily., Disp: , Rfl:   •  LORATADINE PO, Take 1 tablet by mouth Daily., Disp: , Rfl:   •  multivitamin with minerals (MULTIVITAMIN ADULT PO), Take 1 tablet by mouth Daily., Disp: , Rfl:   •  ondansetron (ZOFRAN) 8 MG tablet,  "Take 1 tablet by mouth 3 (Three) Times a Day As Needed for Nausea or Vomiting., Disp: 30 tablet, Rfl: 5  •  ondansetron ODT (ZOFRAN-ODT) 4 MG disintegrating tablet, DISSOLVE 1 TABLET IN MOUTH EVERY 4 HOURS AS NEEDED (MAX 3 DOSES), Disp: , Rfl:   •  simvastatin (ZOCOR) 40 MG tablet, Take 40 mg by mouth Every Night., Disp: , Rfl:     Pain Medications             aspirin 81 MG EC tablet Take 81 mg by mouth Daily.    dexamethasone (DECADRON) 4 MG tablet Take 2 tablets in the morning daily on days 2, 3 & 4.  Take with food.    HYDROcodone-acetaminophen (Norco) 7.5-325 MG per tablet Every 6 (Six) Hours.             ALLERGIES:    Allergies   Allergen Reactions   • Penicillins Rash         Physical Exam    VITAL SIGNS:  /79   Pulse 91   Temp 98 °F (36.7 °C) (Infrared)   Resp 16   Ht 165.1 cm (65\")   Wt 94.5 kg (208 lb 6.4 oz)   SpO2 99%   BMI 34.68 kg/m²     Wt Readings from Last 3 Encounters:   12/28/20 94.5 kg (208 lb 6.4 oz)   12/23/20 93.4 kg (206 lb)   12/22/20 93.8 kg (206 lb 12.8 oz)       Body mass index is 34.68 kg/m². Body surface area is 2.01 meters squared.    Pain Score    12/28/20 0850   PainSc: 0-No pain         Performance Status: 0    General: well appearing, in no acute distress  HEENT: sclera anicteric, neck is supple  Lymphatics: no cervical, supraclavicular, or axillary adenopathy  Cardiovascular: regular rate and rhythm, no murmurs, rubs or gallops  Lungs: clear to auscultation bilaterally  Abdomen: soft, nontender, nondistended.  No palpable organomegaly  Extremities: no lower extremity edema  Skin: no rashes, lesions, bruising, or petechiae  Msk:  Shows no weakness of the large muscle groups  Psych: Mood is stable        RECENT LABS:    Lab Results   Component Value Date    HGB 12.1 (L) 12/23/2020    HCT 37.2 (L) 12/23/2020    MCV 92.8 12/23/2020     12/23/2020    WBC 8.10 12/23/2020    NEUTROABS 6.40 12/23/2020    LYMPHSABS 1.20 12/23/2020    MONOSABS 0.50 12/23/2020       Lab " Results   Component Value Date    GLUCOSE 76 12/23/2020    BUN 22 12/23/2020    CREATININE 0.80 12/23/2020     12/23/2020    K 3.8 12/23/2020     12/23/2020    CO2 23.0 12/23/2020    CALCIUM 8.4 (L) 12/23/2020    PROTEINTOT 6.6 12/10/2020    ALBUMIN 4.00 12/10/2020    BILITOT 0.2 12/10/2020    ALKPHOS 71 12/10/2020    AST 20 12/10/2020    ALT 17 12/10/2020       Nm Pet Skull Base To Mid Thigh    Result Date: 11/12/2020  Abnormal activity identified correlating to bulky adenopathy posterior to the left parotid gland suggesting metastatic disease. Direct visualization is recommended at the base of the left neck to exclude possible skin lesion or fold in the skin creating very low-level activity. There is also low-level activity nodule in the left parotid gland. Continued follow-up recommended as indicated.   D:  11/09/2020 E:  11/09/2020  This report was finalized on 11/12/2020 4:07 PM by Dr. Lilia Carmen MD.            Assessment/Plan    1.  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  He had resection.  Now he is undergoing chemotherapy and radiation to the unresectable disease.  Clinically tolerating it well.  Plan to continue weekly cisplatin with no dose changes.  Plan to see him back in my clinic in 2 weeks to make sure he is doing well.  I will likely need to give him fluids at some point if he becomes more nauseous and dehydrated.  He still early in his treatment.        Moderately complex patient with discussion regarding his treatment and symptoms and management of those symptoms.      Jesus Manuel Viveros MD  [facility] Hematology and Oncology    Return on: 01/11/21  Return in (Approximately): 2 weeks    Orders Placed This Encounter   Procedures   • Basic metabolic panel   • Magnesium   • Basic metabolic panel   • Magnesium   • CBC and Differential   • CBC and Differential       12/28/2020

## 2020-12-29 ENCOUNTER — HOSPITAL ENCOUNTER (OUTPATIENT)
Dept: RADIATION ONCOLOGY | Facility: HOSPITAL | Age: 75
Discharge: HOME | End: 2020-12-29

## 2020-12-29 VITALS — WEIGHT: 209.9 LBS | BODY MASS INDEX: 34.93 KG/M2

## 2020-12-29 PROCEDURE — 77386: CPT | Performed by: RADIOLOGY

## 2020-12-30 ENCOUNTER — HOSPITAL ENCOUNTER (OUTPATIENT)
Dept: RADIATION ONCOLOGY | Facility: HOSPITAL | Age: 75
Discharge: HOME | End: 2020-12-30

## 2020-12-30 ENCOUNTER — HOSPITAL ENCOUNTER (OUTPATIENT)
Dept: ONCOLOGY | Facility: HOSPITAL | Age: 75
Setting detail: INFUSION SERIES
Discharge: HOME | End: 2020-12-30

## 2020-12-30 ENCOUNTER — DOCUMENTATION (OUTPATIENT)
Dept: NUTRITION | Facility: HOSPITAL | Age: 75
End: 2020-12-30

## 2020-12-30 VITALS
RESPIRATION RATE: 18 BRPM | WEIGHT: 204 LBS | HEIGHT: 65 IN | SYSTOLIC BLOOD PRESSURE: 157 MMHG | HEART RATE: 78 BPM | DIASTOLIC BLOOD PRESSURE: 68 MMHG | TEMPERATURE: 97.7 F | BODY MASS INDEX: 33.99 KG/M2

## 2020-12-30 DIAGNOSIS — C44.229 SQUAMOUS CELL CARCINOMA, EAR, LEFT: Primary | ICD-10-CM

## 2020-12-30 LAB
ANION GAP SERPL CALCULATED.3IONS-SCNC: 10 MMOL/L (ref 5–15)
BUN SERPL-MCNC: 23 MG/DL (ref 8–23)
BUN/CREAT SERPL: 24.7 (ref 7–25)
CALCIUM SPEC-SCNC: 9.2 MG/DL (ref 8.6–10.5)
CHLORIDE SERPL-SCNC: 104 MMOL/L (ref 98–107)
CO2 SERPL-SCNC: 25 MMOL/L (ref 22–29)
CREAT BLDA-MCNC: 0.9 MG/DL (ref 0.6–1.3)
CREAT SERPL-MCNC: 0.93 MG/DL (ref 0.76–1.27)
ERYTHROCYTE [DISTWIDTH] IN BLOOD BY AUTOMATED COUNT: 14 % (ref 12.3–15.4)
GFR SERPL CREATININE-BSD FRML MDRD: 79 ML/MIN/1.73
GLUCOSE SERPL-MCNC: 92 MG/DL (ref 65–99)
HCT VFR BLD AUTO: 34.2 % (ref 37.5–51)
HGB BLD-MCNC: 11.2 G/DL (ref 13–17.7)
LYMPHOCYTES # BLD AUTO: 1.1 10*3/MM3 (ref 0.7–3.1)
LYMPHOCYTES NFR BLD AUTO: 12.6 % (ref 19.6–45.3)
MAGNESIUM SERPL-MCNC: 2.2 MG/DL (ref 1.6–2.4)
MCH RBC QN AUTO: 30.3 PG (ref 26.6–33)
MCHC RBC AUTO-ENTMCNC: 32.7 G/DL (ref 31.5–35.7)
MCV RBC AUTO: 92.7 FL (ref 79–97)
MONOCYTES # BLD AUTO: 0.5 10*3/MM3 (ref 0.1–0.9)
MONOCYTES NFR BLD AUTO: 5.8 % (ref 5–12)
NEUTROPHILS NFR BLD AUTO: 7.3 10*3/MM3 (ref 1.7–7)
NEUTROPHILS NFR BLD AUTO: 81.6 % (ref 42.7–76)
PLATELET # BLD AUTO: 161 10*3/MM3 (ref 140–450)
PMV BLD AUTO: 6.1 FL (ref 6–12)
POTASSIUM SERPL-SCNC: 4.5 MMOL/L (ref 3.5–5.2)
RBC # BLD AUTO: 3.69 10*6/MM3 (ref 4.14–5.8)
SODIUM SERPL-SCNC: 139 MMOL/L (ref 136–145)
WBC # BLD AUTO: 8.9 10*3/MM3 (ref 3.4–10.8)

## 2020-12-30 PROCEDURE — 80048 BASIC METABOLIC PNL TOTAL CA: CPT | Performed by: INTERNAL MEDICINE

## 2020-12-30 PROCEDURE — 25010000002 MAGNESIUM SULFATE PER 500 MG OF MAGNESIUM: Performed by: INTERNAL MEDICINE

## 2020-12-30 PROCEDURE — 25010000002 DEXAMETHASONE SODIUM PHOSPHATE 100 MG/10ML SOLUTION: Performed by: INTERNAL MEDICINE

## 2020-12-30 PROCEDURE — 25010000002 POTASSIUM CHLORIDE PER 2 MEQ OF POTASSIUM: Performed by: INTERNAL MEDICINE

## 2020-12-30 PROCEDURE — 96361 HYDRATE IV INFUSION ADD-ON: CPT

## 2020-12-30 PROCEDURE — 85025 COMPLETE CBC W/AUTO DIFF WBC: CPT | Performed by: INTERNAL MEDICINE

## 2020-12-30 PROCEDURE — 96374 THER/PROPH/DIAG INJ IV PUSH: CPT

## 2020-12-30 PROCEDURE — 96367 TX/PROPH/DG ADDL SEQ IV INF: CPT

## 2020-12-30 PROCEDURE — 96413 CHEMO IV INFUSION 1 HR: CPT

## 2020-12-30 PROCEDURE — 77336 RADIATION PHYSICS CONSULT: CPT | Performed by: RADIOLOGY

## 2020-12-30 PROCEDURE — 77386: CPT | Performed by: RADIOLOGY

## 2020-12-30 PROCEDURE — 82565 ASSAY OF CREATININE: CPT

## 2020-12-30 PROCEDURE — 96375 TX/PRO/DX INJ NEW DRUG ADDON: CPT

## 2020-12-30 PROCEDURE — 36592 COLLECT BLOOD FROM PICC: CPT

## 2020-12-30 PROCEDURE — 25010000002 FOSAPREPITANT PER 1 MG: Performed by: INTERNAL MEDICINE

## 2020-12-30 PROCEDURE — 25010000002 PALONOSETRON 0.25 MG/5ML SOLUTION PREFILLED SYRINGE: Performed by: INTERNAL MEDICINE

## 2020-12-30 PROCEDURE — 25010000002 CISPLATIN PER 50 MG: Performed by: INTERNAL MEDICINE

## 2020-12-30 PROCEDURE — 83735 ASSAY OF MAGNESIUM: CPT | Performed by: INTERNAL MEDICINE

## 2020-12-30 PROCEDURE — 96360 HYDRATION IV INFUSION INIT: CPT

## 2020-12-30 RX ORDER — PALONOSETRON 0.05 MG/ML
0.25 INJECTION, SOLUTION INTRAVENOUS ONCE
Status: COMPLETED | OUTPATIENT
Start: 2020-12-30 | End: 2020-12-30

## 2020-12-30 RX ORDER — SODIUM CHLORIDE 9 MG/ML
250 INJECTION, SOLUTION INTRAVENOUS ONCE
Status: COMPLETED | OUTPATIENT
Start: 2020-12-30 | End: 2020-12-30

## 2020-12-30 RX ADMIN — CISPLATIN 82 MG: 1 INJECTION INTRAVENOUS at 11:40

## 2020-12-30 RX ADMIN — DEXAMETHASONE SODIUM PHOSPHATE 12 MG: 10 INJECTION, SOLUTION INTRAMUSCULAR; INTRAVENOUS at 11:06

## 2020-12-30 RX ADMIN — PALONOSETRON 0.25 MG: 0.25 INJECTION, SOLUTION INTRAVENOUS at 11:04

## 2020-12-30 RX ADMIN — SODIUM CHLORIDE 150 MG: 9 INJECTION, SOLUTION INTRAVENOUS at 11:08

## 2020-12-30 RX ADMIN — POTASSIUM CHLORIDE 500 ML: 2 INJECTION, SOLUTION, CONCENTRATE INTRAVENOUS at 09:58

## 2020-12-30 RX ADMIN — POTASSIUM CHLORIDE 500 ML: 2 INJECTION, SOLUTION, CONCENTRATE INTRAVENOUS at 12:53

## 2020-12-30 RX ADMIN — SODIUM CHLORIDE 250 ML: 9 INJECTION, SOLUTION INTRAVENOUS at 11:03

## 2020-12-30 NOTE — PROGRESS NOTES
ONC Nutrition     Diagnosis: Metastatic squamous cell carcinoma of the skin (ear) with cervical lymphadenopathy / Stage IV  Surgery:  Skin resection several months ago that was over his left ear.  Subsequently he developed adenopathy in his neck especially around his parotid.  He underwent left superficial parotidectomy and left neck dissection  with Dr. Patten  Chemotherapy: Concurrent with radiation / Cisplatin / 7 weekly cycles  Radiation: 66 Gray in 33 fractions using IMRT to spare the remaining oral cavity and uninvolved salivary glands    Weight 209.9 lbs / Weight stable    FU with patient during status checks.  Patient states that the only side effect of chemotherapy is a slight HA.  He remains with a good appetite and is eating well.  He is experiencing dry mouth; continues to use the baking soda, salt and water mouth rinses numerous times throughout the day.  Discussed commercial products available for retail sale for dry mouth care.  Will continue to follow.

## 2020-12-31 ENCOUNTER — HOSPITAL ENCOUNTER (OUTPATIENT)
Dept: RADIATION ONCOLOGY | Facility: HOSPITAL | Age: 75
Discharge: HOME | End: 2020-12-31

## 2020-12-31 PROCEDURE — 77386: CPT | Performed by: RADIOLOGY

## 2021-01-03 ENCOUNTER — APPOINTMENT (OUTPATIENT)
Dept: PREADMISSION TESTING | Facility: HOSPITAL | Age: 76
End: 2021-01-03

## 2021-01-03 LAB — SARS-COV-2 RNA RESP QL NAA+PROBE: NOT DETECTED

## 2021-01-03 PROCEDURE — C9803 HOPD COVID-19 SPEC COLLECT: HCPCS

## 2021-01-03 PROCEDURE — U0004 COV-19 TEST NON-CDC HGH THRU: HCPCS

## 2021-01-04 ENCOUNTER — HOSPITAL ENCOUNTER (OUTPATIENT)
Dept: RADIATION ONCOLOGY | Facility: HOSPITAL | Age: 76
Setting detail: RADIATION/ONCOLOGY SERIES
Discharge: HOME | End: 2021-01-04

## 2021-01-04 ENCOUNTER — HOSPITAL ENCOUNTER (OUTPATIENT)
Dept: RADIATION ONCOLOGY | Facility: HOSPITAL | Age: 76
Discharge: HOME | End: 2021-01-04

## 2021-01-04 PROCEDURE — 77386: CPT | Performed by: RADIOLOGY

## 2021-01-05 ENCOUNTER — HOSPITAL ENCOUNTER (OUTPATIENT)
Dept: RADIATION ONCOLOGY | Facility: HOSPITAL | Age: 76
Discharge: HOME | End: 2021-01-05

## 2021-01-05 ENCOUNTER — HOSPITAL ENCOUNTER (OUTPATIENT)
Dept: GENERAL RADIOLOGY | Facility: HOSPITAL | Age: 76
Discharge: HOME | End: 2021-01-05
Admitting: RADIOLOGY

## 2021-01-05 VITALS — BODY MASS INDEX: 33.83 KG/M2 | WEIGHT: 203.3 LBS

## 2021-01-05 DIAGNOSIS — IMO0002 METASTATIC SQUAMOUS CELL CARCINOMA: ICD-10-CM

## 2021-01-05 DIAGNOSIS — R13.12 OROPHARYNGEAL DYSPHAGIA: Primary | ICD-10-CM

## 2021-01-05 PROCEDURE — 63710000001 BARIUM SULFATE 40 % PASTE: Performed by: RADIOLOGY

## 2021-01-05 PROCEDURE — A9270 NON-COVERED ITEM OR SERVICE: HCPCS | Performed by: RADIOLOGY

## 2021-01-05 PROCEDURE — 77386: CPT | Performed by: RADIOLOGY

## 2021-01-05 PROCEDURE — 63710000001 BARIUM SULFATE 40 % SUSPENSION: Performed by: RADIOLOGY

## 2021-01-05 PROCEDURE — 63710000001 BARIUM SULFATE 40 % RECONSTITUTED SUSPENSION: Performed by: RADIOLOGY

## 2021-01-05 PROCEDURE — 92611 MOTION FLUOROSCOPY/SWALLOW: CPT

## 2021-01-05 PROCEDURE — 74230 X-RAY XM SWLNG FUNCJ C+: CPT

## 2021-01-05 RX ADMIN — BARIUM SULFATE 50 ML: 400 SUSPENSION ORAL at 10:36

## 2021-01-05 RX ADMIN — BARIUM SULFATE 20 ML: 400 PASTE ORAL at 10:36

## 2021-01-05 RX ADMIN — BARIUM SULFATE 100 ML: 0.81 POWDER, FOR SUSPENSION ORAL at 10:36

## 2021-01-05 NOTE — MBS/VFSS/FEES
Outpatient Speech Language Pathology   Adult Swallow Initial Evaluation   Loren   Modified Barium Swallow Study (MBS)     Patient Name: Ajith Gallego  : 1945  MRN: [id number]  Today's Date: 2021         Visit Date: 2021   Patient Active Problem List   Diagnosis   • Neck mass   • Squamous cell carcinoma, ear, left   • Prostate cancer (CMS/HCC)        Past Medical History:   Diagnosis Date   • Arthritis    • Cancer of neck (CMS/HCC)    • Elevated cholesterol    • Hypertension    • Pacemaker    • Primary parotid gland squamous cell carcinoma (CMS/HCC) 2020   • Prostate cancer (CMS/HCC)    • Skin cancer    • Tattoo    • Wears glasses         Past Surgical History:   Procedure Laterality Date   • CATARACT EXTRACTION, BILATERAL     • NECK DISSECTION Left 2020    Procedure: MODIFIED RADICAL NECK DISSECTION LEFT;  Surgeon: Adasm Patten MD;  Location: [facility] OR;  Service: ENT;  Laterality: Left;   • OTHER SURGICAL HISTORY      left ear skin cancer removed   • PACEMAKER IMPLANTATION     • PAROTIDECTOMY Left 2020    Procedure: PAROTIDECTOMY WITH FACIAL NERVE DISSECTION LEFT;  Surgeon: Adams Patten MD;  Location: [facility] OR;  Service: ENT;  Laterality: Left;   • PROSTATECTOMY           Visit Dx:     ICD-10-CM ICD-9-CM   1. Oropharyngeal dysphagia  R13.12 787.22   2. Metastatic squamous cell carcinoma (CMS/HCC)  C79.9 199.1           SLP Adult Swallow Evaluation     Row Name 21 1020       Rehab Evaluation    Document Type  evaluation  -RD    Subjective Information  no complaints  -RD    Patient Observations  alert;cooperative;agree to therapy  -RD    Patient/Family/Caregiver Comments/Observations  No family present  -RD    Patient Effort  excellent  -RD       General Information    Patient Profile Reviewed  yes  -RD    Pertinent History Of Current Problem  Pt presents for OP MBS for baseline study prior to H/N radiation per OP SLP recs. Pt is following OP SLP for  prophylactic dysphagia treatment 2' H&N cancer. Pt reports no immediate dysphagia concerns, other that needing softer whole foods since resection and needing to eat slowly. Pt reports some coughing t/o day. PMH significant for Squamous Cell Carcinoma of L ear, L parotid, neck mass and facial lesion. S/p L modified radical neck dissection, L parotidectomy w/ facial nerve dissection and removal of 1cm skin lesion.   -RD    Current Method of Nutrition  soft textures;whole;thin liquids  -RD    Precautions/Limitations, Vision  WFL with corrective lenses;for purposes of eval  -RD    Precautions/Limitations, Hearing  WFL;for purposes of eval  -RD    Prior Level of Function-Communication  WFL  -RD    Prior Level of Function-Swallowing  mechanical soft textures;other (see comments) Pt reports being careful when eating  -RD    Plans/Goals Discussed with  patient;agreed upon  -RD    Barriers to Rehab  medically complex  -RD    Patient's Goals for Discharge  eat/drink without coughing/choking;return to regular diet  -RD       Pain    Additional Documentation  Pain Scale: Numbers Pre/Post-Treatment (Group)  -RD       Pain Scale: Numbers Pre/Post-Treatment    Pretreatment Pain Rating  0/10 - no pain  -RD    Posttreatment Pain Rating  0/10 - no pain  -RD       MBS/VFSS    Utensils Used  spoon;cup;straw  -RD    Consistencies Trialed  thin liquids;nectar/syrup-thick liquids;pudding thick;regular textures  -RD       MBS/VFSS Interpretation    Oral Phase, Comment  Mild oral dysphagia. Pre-spill of liquids to the pharynx 2' reduced lingual/back of tongue control. ? recent anatomy changes impacting somewhat. Mild lingual residue w/ all consistencies. Cleared w/ consecutive swallows. Mastication adequate for soft whole diet. Pt prefers softer solids 2' oral pain.   -RD       Initiation of Pharyngeal Swallow    Initiation of Pharyngeal Swallow  bolus in pyriform sinuses  -RD    Response to Aspiration  no response, silent aspiration  -RD     Rosenbek's Scale  thin:;8--->level 8;nectar:;pudding/puree:;2--->level 2;regular textures:;1--->level 1  -RD    Attempted Compensatory Maneuvers  bolus size;bolus presentation style;additional subsequent swallow;effortful (hard swallow);throat clear after swallow;combination of maneuvers (see comments);other (see comments) 3 second prep; hold bolus anteriorly prior to swallow  -RD    Response to Attempted Compensatory Maneuvers  did not prevent aspiration;other (see comments) only prevented aspiration w/ nectar-thick liquids  -RD    Pharyngeal Phase, Comment  Mild-moderate pharyngeal dysphagia. Penetration/aspiration before/during the swallow w/ thin liquids 2' pre-spill, delayed initiation, and delayed/reduced vestibular closure. Aspiration was completely silent. Compensations were unsuccessful to prevent aspiration w/ thin liquids. Penetration during the swallow w/ nectar-thick liquids and pudding x1, however penetration was high and cleared upon completion of the swallow. No aspiration w/ nectar-thick liquids, pudding, or solids. Mild-moderate diffuse pharyngeal residue (> on base of tongue and in valleculae) 2' reduced base of tongue retraction, poor elevation and excursion, and decr'd pharyngeal stripping. Reduced sustained hyolaryngeal excursion also impacting sustained opening of pharyngoesophageal segment (PES). Discussed silent aspiration and risks w/ pt as well as modified diet recs and need for oropharyngeal strengthening w/ OP SLP. Pt agreeable to OP SLP treatment and recommended diet modifications. Nectar-thick (simply thick) starter kit pack provided for pt and education provided on ordering thickener. Pt needs to follow up w/ OP SLP for further education as well.   -RD       Esophageal Phase    Esophageal Phase  esophageal retention with retrograde flow through PES;other (see comments) reduced PES opening contributing, minimal retrograde noted  -RD    Esophageal Phase, Comment  pt would benefit  from general reflux precautions.   -RD       Clinical Impression    SLP Swallowing Diagnosis  mild oral dysphagia and mild-moderate pharyngeal dysphagia  -RD    Functional Impact  risk of aspiration/pneumonia;risk of malnutrition;risk of dehydration  -RD    Rehab Potential/Prognosis, Swallowing  good, to achieve stated therapy goals  -RD    Swallow Criteria for Skilled Therapeutic Interventions Met  demonstrates skilled criteria  -RD       Recommendations    Therapy Frequency (Swallow)  evaluation only  -RD    Predicted Duration Therapy Intervention (Days)  other (see comments) per treating SLP & MD discretion  -RD    SLP Diet Recommendation 1. mechanical soft with no mixed consistencies (whole solids okay, avoid/modify mixed consistencies)  2. Nectar thick liquids  -RD    Recommended Diagnostics 3. reassess via VFSS (MBS)-as strength improves, per treating SLP discretion  -RD    Recommended Precautions and Strategies 4. upright posture during/after eating  5. small bites of food and sips of liquid  6. general aspiration and reflux precautions  -RD    Oral Care Recommendations 7. Oral Care BID/PRN  -RD    SLP Rec. for Method of Medication Administration 8. meds whole with pudding or applesauce as tolerated  -RD    Monitor for Signs of Aspiration  yes;notify SLP if any concerns  -RD    Anticipated Discharge Disposition (SLP) 9. home with OP services-anticipate therapy at next level of care- pt would benefit from cont'd OP SLP services to address current oropharyngeal dysphagia  -RD      User Key  (r) = Recorded By, (t) = Taken By, (c) = Cosigned By    Initials Name Provider Type    Isela Lopez MS CCC-SLP Speech and Language Pathologist                                              Time Calculation:   SLP Start Time: 1020    Therapy Charges for Today     Code Description Service Date Service Provider Modifiers Qty    [id number] HC ST MOTION FLUORO EVAL SWALLOW 5 1/5/2021 Isela Kim MS CCC-SLP GN 1             Patient was not wearing a face mask and did exhibit coughing during this therapy encounter.  Procedure performed was aerosolizing, involved close contact (within 6 feet for at least 15 minutes or longer), and did not involve contact with infectious secretions or specimens.  Therapist used appropriate personal protective equipment including gloves, standard procedure mask and eye protection.  Appropriate PPE was worn during the entire therapy session.  Hand hygiene was completed before and after therapy session.           Isela Kim MS CCC-SLP  1/5/2021

## 2021-01-06 ENCOUNTER — HOSPITAL ENCOUNTER (OUTPATIENT)
Dept: RADIATION ONCOLOGY | Facility: HOSPITAL | Age: 76
Discharge: HOME | End: 2021-01-06

## 2021-01-06 ENCOUNTER — DOCUMENTATION (OUTPATIENT)
Dept: NUTRITION | Facility: HOSPITAL | Age: 76
End: 2021-01-06

## 2021-01-06 ENCOUNTER — HOSPITAL ENCOUNTER (OUTPATIENT)
Dept: ONCOLOGY | Facility: HOSPITAL | Age: 76
Setting detail: INFUSION SERIES
Discharge: HOME | End: 2021-01-06

## 2021-01-06 VITALS
RESPIRATION RATE: 20 BRPM | WEIGHT: 204 LBS | SYSTOLIC BLOOD PRESSURE: 156 MMHG | BODY MASS INDEX: 33.99 KG/M2 | TEMPERATURE: 97.4 F | HEIGHT: 65 IN | DIASTOLIC BLOOD PRESSURE: 75 MMHG | HEART RATE: 107 BPM

## 2021-01-06 DIAGNOSIS — C44.229 SQUAMOUS CELL CARCINOMA, EAR, LEFT: Primary | ICD-10-CM

## 2021-01-06 LAB
ANION GAP SERPL CALCULATED.3IONS-SCNC: 13 MMOL/L (ref 5–15)
BUN SERPL-MCNC: 30 MG/DL (ref 8–23)
BUN/CREAT SERPL: 26.8 (ref 7–25)
CALCIUM SPEC-SCNC: 8.9 MG/DL (ref 8.6–10.5)
CHLORIDE SERPL-SCNC: 101 MMOL/L (ref 98–107)
CO2 SERPL-SCNC: 21 MMOL/L (ref 22–29)
CREAT BLDA-MCNC: 1.2 MG/DL
CREAT BLDA-MCNC: 1.2 MG/DL (ref 0.6–1.3)
CREAT SERPL-MCNC: 1.12 MG/DL (ref 0.76–1.27)
ERYTHROCYTE [DISTWIDTH] IN BLOOD BY AUTOMATED COUNT: 13.7 % (ref 12.3–15.4)
GFR SERPL CREATININE-BSD FRML MDRD: 64 ML/MIN/1.73
GLUCOSE SERPL-MCNC: 91 MG/DL (ref 65–99)
HCT VFR BLD AUTO: 34.5 % (ref 37.5–51)
HGB BLD-MCNC: 11.4 G/DL (ref 13–17.7)
LYMPHOCYTES # BLD AUTO: 0.5 10*3/MM3 (ref 0.7–3.1)
LYMPHOCYTES NFR BLD AUTO: 5.8 % (ref 19.6–45.3)
MAGNESIUM SERPL-MCNC: 1.9 MG/DL (ref 1.6–2.4)
MCH RBC QN AUTO: 30.6 PG (ref 26.6–33)
MCHC RBC AUTO-ENTMCNC: 33.2 G/DL (ref 31.5–35.7)
MCV RBC AUTO: 92.1 FL (ref 79–97)
MONOCYTES # BLD AUTO: 0.3 10*3/MM3 (ref 0.1–0.9)
MONOCYTES NFR BLD AUTO: 4.2 % (ref 5–12)
NEUTROPHILS NFR BLD AUTO: 7.1 10*3/MM3 (ref 1.7–7)
NEUTROPHILS NFR BLD AUTO: 90 % (ref 42.7–76)
PLATELET # BLD AUTO: 187 10*3/MM3 (ref 140–450)
PMV BLD AUTO: 5.5 FL (ref 6–12)
POTASSIUM SERPL-SCNC: 4.6 MMOL/L (ref 3.5–5.2)
RBC # BLD AUTO: 3.74 10*6/MM3 (ref 4.14–5.8)
SODIUM SERPL-SCNC: 135 MMOL/L (ref 136–145)
WBC # BLD AUTO: 7.9 10*3/MM3 (ref 3.4–10.8)

## 2021-01-06 PROCEDURE — 96366 THER/PROPH/DIAG IV INF ADDON: CPT

## 2021-01-06 PROCEDURE — 96367 TX/PROPH/DG ADDL SEQ IV INF: CPT

## 2021-01-06 PROCEDURE — 83735 ASSAY OF MAGNESIUM: CPT | Performed by: INTERNAL MEDICINE

## 2021-01-06 PROCEDURE — 96375 TX/PRO/DX INJ NEW DRUG ADDON: CPT

## 2021-01-06 PROCEDURE — 25010000002 FOSAPREPITANT PER 1 MG: Performed by: INTERNAL MEDICINE

## 2021-01-06 PROCEDURE — 25010000002 DEXAMETHASONE SODIUM PHOSPHATE 100 MG/10ML SOLUTION: Performed by: INTERNAL MEDICINE

## 2021-01-06 PROCEDURE — 80048 BASIC METABOLIC PNL TOTAL CA: CPT | Performed by: INTERNAL MEDICINE

## 2021-01-06 PROCEDURE — 25010000002 MAGNESIUM SULFATE PER 500 MG OF MAGNESIUM: Performed by: INTERNAL MEDICINE

## 2021-01-06 PROCEDURE — 82565 ASSAY OF CREATININE: CPT

## 2021-01-06 PROCEDURE — 25010000002 POTASSIUM CHLORIDE PER 2 MEQ OF POTASSIUM: Performed by: INTERNAL MEDICINE

## 2021-01-06 PROCEDURE — 25010000002 CISPLATIN PER 50 MG: Performed by: INTERNAL MEDICINE

## 2021-01-06 PROCEDURE — 77386: CPT | Performed by: RADIOLOGY

## 2021-01-06 PROCEDURE — 25010000002 PALONOSETRON 0.25 MG/5ML SOLUTION PREFILLED SYRINGE: Performed by: INTERNAL MEDICINE

## 2021-01-06 PROCEDURE — 85025 COMPLETE CBC W/AUTO DIFF WBC: CPT | Performed by: INTERNAL MEDICINE

## 2021-01-06 PROCEDURE — 96413 CHEMO IV INFUSION 1 HR: CPT

## 2021-01-06 RX ORDER — SODIUM CHLORIDE 9 MG/ML
250 INJECTION, SOLUTION INTRAVENOUS ONCE
Status: COMPLETED | OUTPATIENT
Start: 2021-01-06 | End: 2021-01-06

## 2021-01-06 RX ORDER — PALONOSETRON 0.05 MG/ML
0.25 INJECTION, SOLUTION INTRAVENOUS ONCE
Status: COMPLETED | OUTPATIENT
Start: 2021-01-06 | End: 2021-01-06

## 2021-01-06 RX ADMIN — POTASSIUM CHLORIDE 500 ML: 2 INJECTION, SOLUTION, CONCENTRATE INTRAVENOUS at 13:26

## 2021-01-06 RX ADMIN — POTASSIUM CHLORIDE 500 ML: 2 INJECTION, SOLUTION, CONCENTRATE INTRAVENOUS at 10:24

## 2021-01-06 RX ADMIN — PALONOSETRON 0.25 MG: 0.25 INJECTION, SOLUTION INTRAVENOUS at 11:30

## 2021-01-06 RX ADMIN — CISPLATIN 82 MG: 1 INJECTION INTRAVENOUS at 12:19

## 2021-01-06 RX ADMIN — SODIUM CHLORIDE 150 MG: 9 INJECTION, SOLUTION INTRAVENOUS at 11:34

## 2021-01-06 RX ADMIN — DEXAMETHASONE SODIUM PHOSPHATE 12 MG: 10 INJECTION, SOLUTION INTRAMUSCULAR; INTRAVENOUS at 11:34

## 2021-01-06 RX ADMIN — SODIUM CHLORIDE 250 ML: 9 INJECTION, SOLUTION INTRAVENOUS at 10:24

## 2021-01-06 NOTE — PROGRESS NOTES
ONC Nutrition     Diagnosis: Metastatic squamous cell carcinoma of the skin (ear) with cervical lymphadenopathy / Stage IV  Surgery:  Skin resection several months ago that was over his left ear.  Subsequently he developed adenopathy in his neck especially around his parotid.  He underwent left superficial parotidectomy and left neck dissection  with Dr. Patten  Chemotherapy: Concurrent with radiation / Cisplatin / 7 weekly cycles  Radiation: 66 Gray in 33 fractions using IMRT to spare the remaining oral cavity and uninvolved salivary glands / has completed 10/33 fractions     Weight 203.3 lbs / 6.6 lbs weight loss over the past week - ? Accuracy of last week's weight of 209.9 lbs.    Patient seen during [facility] ONC status checks.  He continues to eat well and denies any difficulty with swallowing most normal consistencies and textures at this time.      States that he was constipated for several days last week and didn't eat as much as normal, thus accounting for part of the weight loss experienced over the past week.  He has had a daily habit of using Metamucil 2x per day for years and stopped doing it at the beginning of treatment; strongly encouraged patient to restart, as this would have no effect on treatment and would aid to maintain regularity.    He continues to have dry mouth, but being proactive with doing the baking soda, salt and water mouth rinses several times throughout the day.    Reinforced his positive efforts to maintain nutritional status.  Discussed changes that he might experience over the coming week with progression of treatment.

## 2021-01-07 ENCOUNTER — HOSPITAL ENCOUNTER (OUTPATIENT)
Dept: SPEECH THERAPY | Facility: HOSPITAL | Age: 76
Setting detail: THERAPIES SERIES
Discharge: HOME | End: 2021-01-07

## 2021-01-07 ENCOUNTER — HOSPITAL ENCOUNTER (OUTPATIENT)
Dept: RADIATION ONCOLOGY | Facility: HOSPITAL | Age: 76
Discharge: HOME | End: 2021-01-07

## 2021-01-07 DIAGNOSIS — C44.229 SQUAMOUS CELL CARCINOMA, EAR, LEFT: Primary | ICD-10-CM

## 2021-01-07 PROCEDURE — 92526 ORAL FUNCTION THERAPY: CPT

## 2021-01-07 PROCEDURE — 77336 RADIATION PHYSICS CONSULT: CPT | Performed by: RADIOLOGY

## 2021-01-07 PROCEDURE — 77386: CPT | Performed by: RADIOLOGY

## 2021-01-07 NOTE — THERAPY TREATMENT NOTE
Outpatient Speech Language Pathology   Adult Swallow Treatment Note  [facility]     Patient Name: Ajith Gallego  : 1945  MRN: [id number]  Today's Date: 2021         Visit Date: 2021   Patient Active Problem List   Diagnosis   • Neck mass   • Squamous cell carcinoma, ear, left   • Prostate cancer (CMS/HCC)        Visit Dx:    ICD-10-CM ICD-9-CM   1. Squamous cell carcinoma, ear, left  C44.229 173.22                         SLP OP Goals     Row Name 21 0800          Goal Type Needed    Goal Type Needed  Dysphagia;Other Adult Goals  -HG        Subjective Comments    Subjective Comments  Pt alert, cooperative, reviewed MBS with pt.   -HG        Dysphagia Goals    Patient will safely consume the recommended diet without complications such as aspiration pneumonia  NTLs/mech soft diet.  -HG     Status: Patient will safely consume the recommended diet without complications such as aspiration pneumonia  Progressing as expected;Revised  -HG     Comments: Patient will safely consume the recommended diet without complications such as aspiration pneumonia  21: Pt with recent MBS with results: Mild-moderate pharyngeal dysphagia. Penetration/aspiration before/during the swallow w/ thin liquids 2' pre-spill, delayed initiation, and delayed/reduced vestibular closure. Aspiration was completely silent- please see MBS full report.  Diet and liquids changed to mech soft no mixed consistencies and NTLs.   -HG     Comments: Patient will improve oral skills to enhance safety and increase eating efficiency by increasing accuracy of back of tongue control  N/A at this time.  -HG     Patient will improve stage transition duration of swallow/improve timing to reduce food falling into the airway by decreasing swallow delay after neurosensory  stimulation  super-supraglottic swallow;mendelsohn maneuver;with cues  -HG     Status: Patient will improve stage transition duration of swallow/improve timing to reduce  food falling into the airway by decreasing swallow delay after neurosensory  stimulation  Progressing as expected  -HG     Comments: Patient will improve stage transition duration of swallow/improve timing to reduce food falling into the airway by decreasing swallow delay after neurosensory  stimulation  1/7/21: Pt completed each x 2-3x.   -HG     Patient will increase laryngeal elevation to reduce residue that might fall into airway by completing  Mendelsohn maneuver;super-supraglottic swallow;falsetto/pitch glide;with cues  -HG     Status: Patient will increase laryngeal elevation to reduce residue that might fall into airway by completing  Progressing as expected  -HG     Comments: Patient will increase laryngeal elevation to reduce residue that might fall into airway by completing  1/7/21: Pt feels that he has a small shah apple.  Mendelsohn x 3. Falsetto/pitch completed x 3.   -HG     Patient will increase closure of larynx to keep food from falling into the airway by completing  super-supraglottic swallow;with cues  -HG     Status: Patient will increase closure of larynx to keep food from falling into the airway by completing  Progressing as expected  -HG     Comments: Patient will increase closure of larynx to keep food from falling into the airway by completing  1/7/12: With use of water in session, pt completed super-supraglottic swallow x 2.   -HG     Patient will increase strength of tongue base and posterior pharyngeal walls to reduce residue that might fall into airway by completing  effotful swallow;Carla (tongue hold);with cues  -HG     Status: Patient will increase strength of tongue base and posterior pharyngeal walls to reduce residue that might fall into airway by completing  Progressing as expected  -HG     Comments: Patient will increase strength of tongue base and posterior pharyngeal walls to reduce residue that might fall into airway by completing  1/7/21: No difficulty with effortful  swallow. Carla is challenging and pt made 3 attempts during session.   -HG     Patient will improve hyolaryngeal elevation via completing Luis E (head lift)  sustained lift (comment #/duration of lifts);repetitive lift (comment #/duration of lifts);with cues 30 sustained and 15 reps.  -HG     Status: Patient will improve hyolaryngeal elevation via completing Luis E (head lift)  Progressing as expected  -HG     Comments: Patient will improve hyolaryngeal elevation via completing Luis E (head lift)  1/7/21: Pt reports timing himself and is able to hold for 35 seconds and completes reps with no neck pain.   -HG        Other Goals    Other Adult Goal- 1  Pt will complete stretching of strap muscles in order to improve ROM.  -HG     Status: Other Adult Goal- 1  Progressing as expected  -HG     Comments: Other Adult Goal- 1  1/7/21: Pt reports completing stretching exercises.   -HG     Other Adult Goal- 2  Pt will complete MBS with RECs to follow as indicated.   -HG     Status: Other Adult Goal- 2  Progressing as expected  -HG     Comments: Other Adult Goal- 2  1/7/21: MBS results from 1/5/21: Mild-moderate pharyngeal dysphagia. Penetration/aspiration before/during the swallow w/ thin liquids 2' pre-spill, delayed initiation, and delayed/reduced vestibular closure. Aspiration was completely silent.  -HG     Other Adult Goal- 3  Pt will tolerate water between meals only after oral care.  -HG     Status: Other Adult Goal- 3  New  -HG        SLP Time Calculation    SLP Goal Re-Cert Due Date  01/13/21  -HG       User Key  (r) = Recorded By, (t) = Taken By, (c) = Cosigned By    Initials Name Provider Type    Iris Duran MS CCC-SLP Speech and Language Pathologist          OP SLP Education     Row Name 01/07/21 0800       Education    Education Comments  Education for MBS findings, purpose of each exercise with demonstration and visual using a poster and his own MBS to better relate the exercises. Education and  instruction on water protocol.   -      User Key  (r) = Recorded By, (t) = Taken By, (c) = Cosigned By    Initials Name Effective Dates    Iris Duran MS CCC-SLP 08/09/20 -           OP SLP Assessment/Plan - 01/07/21 0800        SLP Plan    Plan Comments  Cont with Dysphagia tx.    -      User Key  (r) = Recorded By, (t) = Taken By, (c) = Cosigned By    Initials Name Provider Type    Iris Duran MS CCC-JOSELITO Speech and Language Pathologist                Time Calculation:   SLP Start Time: 0800    Therapy Charges for Today     Code Description Service Date Service Provider Modifiers Qty    [id number] HC ST TREATMENT SWALLOW 3 1/7/2021 Iris Zhang MS CCC-SLP GN 1                   MS JAMILA Fabian  1/7/2021

## 2021-01-08 ENCOUNTER — HOSPITAL ENCOUNTER (OUTPATIENT)
Dept: RADIATION ONCOLOGY | Facility: HOSPITAL | Age: 76
Discharge: HOME | End: 2021-01-08

## 2021-01-08 PROCEDURE — 77386: CPT | Performed by: RADIOLOGY

## 2021-01-11 ENCOUNTER — OFFICE VISIT (OUTPATIENT)
Dept: ONCOLOGY | Facility: CLINIC | Age: 76
End: 2021-01-11

## 2021-01-11 ENCOUNTER — HOSPITAL ENCOUNTER (OUTPATIENT)
Dept: RADIATION ONCOLOGY | Facility: HOSPITAL | Age: 76
Discharge: HOME | End: 2021-01-11

## 2021-01-11 VITALS
DIASTOLIC BLOOD PRESSURE: 82 MMHG | BODY MASS INDEX: 33.76 KG/M2 | SYSTOLIC BLOOD PRESSURE: 146 MMHG | RESPIRATION RATE: 16 BRPM | TEMPERATURE: 97.8 F | WEIGHT: 202.6 LBS | HEART RATE: 89 BPM | OXYGEN SATURATION: 96 % | HEIGHT: 65 IN

## 2021-01-11 DIAGNOSIS — C44.229 SQUAMOUS CELL CARCINOMA, EAR, LEFT: Primary | ICD-10-CM

## 2021-01-11 PROCEDURE — 99214 OFFICE O/P EST MOD 30 MIN: CPT | Performed by: INTERNAL MEDICINE

## 2021-01-11 PROCEDURE — 77386: CPT | Performed by: RADIOLOGY

## 2021-01-11 RX ORDER — SODIUM CHLORIDE 9 MG/ML
250 INJECTION, SOLUTION INTRAVENOUS ONCE
Status: CANCELLED | OUTPATIENT
Start: 2021-01-20

## 2021-01-11 RX ORDER — SODIUM CHLORIDE 9 MG/ML
250 INJECTION, SOLUTION INTRAVENOUS ONCE
Status: CANCELLED | OUTPATIENT
Start: 2021-01-13

## 2021-01-11 RX ORDER — PALONOSETRON 0.05 MG/ML
0.25 INJECTION, SOLUTION INTRAVENOUS ONCE
Status: CANCELLED | OUTPATIENT
Start: 2021-01-13

## 2021-01-11 RX ORDER — PALONOSETRON 0.05 MG/ML
0.25 INJECTION, SOLUTION INTRAVENOUS ONCE
Status: CANCELLED | OUTPATIENT
Start: 2021-01-20

## 2021-01-11 RX ORDER — PALONOSETRON 0.05 MG/ML
0.25 INJECTION, SOLUTION INTRAVENOUS ONCE
Status: CANCELLED | OUTPATIENT
Start: 2021-01-27

## 2021-01-11 RX ORDER — SODIUM CHLORIDE 9 MG/ML
250 INJECTION, SOLUTION INTRAVENOUS ONCE
Status: CANCELLED | OUTPATIENT
Start: 2021-01-27

## 2021-01-11 NOTE — PROGRESS NOTES
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  male presents today for follow-up of metastatic squamous cell of the skin.  He is currently on cisplatin weekly with concurrent radiation due to unresectable disease.  He has 20 more radiations left.  He is tolerating both treatments reasonably well.  Denies any issues other than some taste changes.  Does have some fatigue.    Past medical history, social history and family history was reviewed and unchanged from prior visit.    Review of Systems:    Review of Systems   Constitutional: Positive for fatigue.   HENT:  Negative.    Eyes: Negative.    Respiratory: Negative.    Cardiovascular: Negative.    Gastrointestinal: Negative.    Endocrine: Negative.    Genitourinary: Negative.     Musculoskeletal: Negative.    Skin: Negative.    Neurological: Negative.    Hematological: Negative.    Psychiatric/Behavioral: Negative.             Medications:        Current Outpatient Medications:   •  aspirin 81 MG EC tablet, Take 81 mg by mouth Daily., Disp: , Rfl:   •  bacitracin-polymyxin b (POLYSPORIN) 500-33344 UNIT/GM ointment, bacitracin zinc 500 unit-polymyxin B 10,000 unit/gram topical ointment  apply a small amount to affected area twice daily, Disp: , Rfl:   •  Bee Pollen 500 MG chewable tablet, Chew., Disp: , Rfl:   •  dexamethasone (DECADRON) 4 MG tablet, Take 2 tablets in the morning daily on days 2, 3 & 4.  Take with food., Disp: 30 tablet, Rfl: 5  •   "HYDROcodone-acetaminophen (Norco) 7.5-325 MG per tablet, Every 6 (Six) Hours., Disp: , Rfl:   •  lisinopril (PRINIVIL,ZESTRIL) 10 MG tablet, Take 10 mg by mouth Daily., Disp: , Rfl:   •  LORATADINE PO, Take 1 tablet by mouth Daily., Disp: , Rfl:   •  Magic mouthwash Radonc, Swish and swallow 10 mL 4 (Four) Times a Day Before Meals & at Bedtime., Disp: 480 mL, Rfl: 3  •  multivitamin with minerals (MULTIVITAMIN ADULT PO), Take 1 tablet by mouth Daily., Disp: , Rfl:   •  ondansetron (ZOFRAN) 8 MG tablet, Take 1 tablet by mouth 3 (Three) Times a Day As Needed for Nausea or Vomiting., Disp: 30 tablet, Rfl: 5  •  ondansetron ODT (ZOFRAN-ODT) 4 MG disintegrating tablet, DISSOLVE 1 TABLET IN MOUTH EVERY 4 HOURS AS NEEDED (MAX 3 DOSES), Disp: , Rfl:   •  simvastatin (ZOCOR) 40 MG tablet, Take 40 mg by mouth Every Night., Disp: , Rfl:     Pain Medications             aspirin 81 MG EC tablet Take 81 mg by mouth Daily.    dexamethasone (DECADRON) 4 MG tablet Take 2 tablets in the morning daily on days 2, 3 & 4.  Take with food.    HYDROcodone-acetaminophen (Norco) 7.5-325 MG per tablet Every 6 (Six) Hours.    Magic mouthwash Radonc Swish and swallow 10 mL 4 (Four) Times a Day Before Meals & at Bedtime.             ALLERGIES:    Allergies   Allergen Reactions   • Penicillins Rash         Physical Exam    VITAL SIGNS:  /82   Pulse 89   Temp 97.8 °F (36.6 °C) (Infrared)   Resp 16   Ht 165.1 cm (65\")   Wt 91.9 kg (202 lb 9.6 oz)   SpO2 96%   BMI 33.71 kg/m²     Wt Readings from Last 3 Encounters:   01/11/21 91.9 kg (202 lb 9.6 oz)   01/06/21 92.5 kg (204 lb)   01/05/21 92.2 kg (203 lb 4.8 oz)       Body mass index is 33.71 kg/m². Body surface area is 1.99 meters squared.    Pain Score    01/11/21 0943   PainSc:   5   PainLoc: Head         Performance Status: 0    General: well appearing, in no acute distress  HEENT: sclera anicteric, neck is supple  Lymphatics: no cervical, supraclavicular, or axillary " adenopathy  Cardiovascular: regular rate and rhythm, no murmurs, rubs or gallops  Lungs: clear to auscultation bilaterally  Abdomen: soft, nontender, nondistended.  No palpable organomegaly  Extremities: no lower extremity edema  Skin: no rashes, lesions, bruising, or petechiae  Msk:  Shows no weakness of the large muscle groups  Psych: Mood is stable        RECENT LABS:    Lab Results   Component Value Date    HGB 11.4 (L) 01/06/2021    HCT 34.5 (L) 01/06/2021    MCV 92.1 01/06/2021     01/06/2021    WBC 7.90 01/06/2021    NEUTROABS 7.10 (H) 01/06/2021    LYMPHSABS 0.50 (L) 01/06/2021    MONOSABS 0.30 01/06/2021       Lab Results   Component Value Date    GLUCOSE 91 01/06/2021    BUN 30 (H) 01/06/2021    CREATININE 1.20 01/06/2021     (L) 01/06/2021    K 4.6 01/06/2021     01/06/2021    CO2 21.0 (L) 01/06/2021    CALCIUM 8.9 01/06/2021    PROTEINTOT 6.6 12/10/2020    ALBUMIN 4.00 12/10/2020    BILITOT 0.2 12/10/2020    ALKPHOS 71 12/10/2020    AST 20 12/10/2020    ALT 17 12/10/2020       Nm Pet Skull Base To Mid Thigh    Result Date: 11/12/2020  Abnormal activity identified correlating to bulky adenopathy posterior to the left parotid gland suggesting metastatic disease. Direct visualization is recommended at the base of the left neck to exclude possible skin lesion or fold in the skin creating very low-level activity. There is also low-level activity nodule in the left parotid gland. Continued follow-up recommended as indicated.   D:  11/09/2020 E:  11/09/2020  This report was finalized on 11/12/2020 4:07 PM by Dr. Lilia Carmen MD.            Assessment/Plan    1.  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  He had resection.  Now he is undergoing chemotherapy and radiation to the unresectable disease.  Clinically tolerating it well.  Continue cisplatin with no dose changes.  My plan is to continue until radiation is completed.  I plan to repeat  his PET scans in 4 months after completion of radiation.  He will likely need  CAT scans done post radiation.        Moderately complex patient with discussion regarding his treatment and symptoms and management of those symptoms.      Jesus Manuel Viveros MD  [facility] Hematology and Oncology    Return on: 02/03/21  Return in (Approximately): 1 month    Orders Placed This Encounter   Procedures   • Comprehensive metabolic panel   • Magnesium   • Basic metabolic panel   • Magnesium   • Basic metabolic panel   • Magnesium   • CBC and Differential   • CBC and Differential   • CBC and Differential       1/11/2021

## 2021-01-12 ENCOUNTER — HOSPITAL ENCOUNTER (OUTPATIENT)
Dept: RADIATION ONCOLOGY | Facility: HOSPITAL | Age: 76
Discharge: HOME | End: 2021-01-12

## 2021-01-12 VITALS — BODY MASS INDEX: 33.25 KG/M2 | WEIGHT: 199.8 LBS

## 2021-01-12 PROCEDURE — 77386: CPT | Performed by: RADIOLOGY

## 2021-01-13 ENCOUNTER — HOSPITAL ENCOUNTER (OUTPATIENT)
Dept: RADIATION ONCOLOGY | Facility: HOSPITAL | Age: 76
Discharge: HOME | End: 2021-01-13

## 2021-01-13 ENCOUNTER — DOCUMENTATION (OUTPATIENT)
Dept: NUTRITION | Facility: HOSPITAL | Age: 76
End: 2021-01-13

## 2021-01-13 ENCOUNTER — HOSPITAL ENCOUNTER (OUTPATIENT)
Dept: ONCOLOGY | Facility: HOSPITAL | Age: 76
Setting detail: INFUSION SERIES
Discharge: HOME | End: 2021-01-13

## 2021-01-13 VITALS
DIASTOLIC BLOOD PRESSURE: 63 MMHG | HEART RATE: 75 BPM | TEMPERATURE: 97.5 F | SYSTOLIC BLOOD PRESSURE: 130 MMHG | BODY MASS INDEX: 33.15 KG/M2 | WEIGHT: 199 LBS | HEIGHT: 65 IN | RESPIRATION RATE: 20 BRPM

## 2021-01-13 DIAGNOSIS — C44.229 SQUAMOUS CELL CARCINOMA, EAR, LEFT: Primary | ICD-10-CM

## 2021-01-13 LAB
ALBUMIN SERPL-MCNC: 3.6 G/DL (ref 3.5–5.2)
ALBUMIN/GLOB SERPL: 1.5 G/DL
ALP SERPL-CCNC: 80 U/L (ref 39–117)
ALT SERPL W P-5'-P-CCNC: 21 U/L (ref 1–41)
ANION GAP SERPL CALCULATED.3IONS-SCNC: 11 MMOL/L (ref 5–15)
AST SERPL-CCNC: 16 U/L (ref 1–40)
BILIRUB SERPL-MCNC: 0.3 MG/DL (ref 0–1.2)
BUN SERPL-MCNC: 33 MG/DL (ref 8–23)
BUN/CREAT SERPL: 28.4 (ref 7–25)
CALCIUM SPEC-SCNC: 9.2 MG/DL (ref 8.6–10.5)
CHLORIDE SERPL-SCNC: 102 MMOL/L (ref 98–107)
CO2 SERPL-SCNC: 24 MMOL/L (ref 22–29)
CREAT BLDA-MCNC: 1.4 MG/DL (ref 0.6–1.3)
CREAT SERPL-MCNC: 1.16 MG/DL (ref 0.76–1.27)
ERYTHROCYTE [DISTWIDTH] IN BLOOD BY AUTOMATED COUNT: 13.3 % (ref 12.3–15.4)
GFR SERPL CREATININE-BSD FRML MDRD: 61 ML/MIN/1.73
GLOBULIN UR ELPH-MCNC: 2.4 GM/DL
GLUCOSE SERPL-MCNC: 92 MG/DL (ref 65–99)
HCT VFR BLD AUTO: 31 % (ref 37.5–51)
HGB BLD-MCNC: 10.3 G/DL (ref 13–17.7)
LYMPHOCYTES # BLD AUTO: 0.8 10*3/MM3 (ref 0.7–3.1)
LYMPHOCYTES NFR BLD AUTO: 17.1 % (ref 19.6–45.3)
MAGNESIUM SERPL-MCNC: 1.6 MG/DL (ref 1.6–2.4)
MCH RBC QN AUTO: 30.3 PG (ref 26.6–33)
MCHC RBC AUTO-ENTMCNC: 33.3 G/DL (ref 31.5–35.7)
MCV RBC AUTO: 90.8 FL (ref 79–97)
MONOCYTES # BLD AUTO: 0.2 10*3/MM3 (ref 0.1–0.9)
MONOCYTES NFR BLD AUTO: 5.2 % (ref 5–12)
NEUTROPHILS NFR BLD AUTO: 3.5 10*3/MM3 (ref 1.7–7)
NEUTROPHILS NFR BLD AUTO: 77.7 % (ref 42.7–76)
PLATELET # BLD AUTO: 106 10*3/MM3 (ref 140–450)
PMV BLD AUTO: 6.4 FL (ref 6–12)
POTASSIUM SERPL-SCNC: 4.6 MMOL/L (ref 3.5–5.2)
PROT SERPL-MCNC: 6 G/DL (ref 6–8.5)
RBC # BLD AUTO: 3.41 10*6/MM3 (ref 4.14–5.8)
SODIUM SERPL-SCNC: 137 MMOL/L (ref 136–145)
WBC # BLD AUTO: 4.5 10*3/MM3 (ref 3.4–10.8)

## 2021-01-13 PROCEDURE — 25010000002 PALONOSETRON 0.25 MG/5ML SOLUTION PREFILLED SYRINGE: Performed by: INTERNAL MEDICINE

## 2021-01-13 PROCEDURE — 25010000002 POTASSIUM CHLORIDE PER 2 MEQ OF POTASSIUM: Performed by: INTERNAL MEDICINE

## 2021-01-13 PROCEDURE — 82565 ASSAY OF CREATININE: CPT

## 2021-01-13 PROCEDURE — 83735 ASSAY OF MAGNESIUM: CPT | Performed by: INTERNAL MEDICINE

## 2021-01-13 PROCEDURE — 80053 COMPREHEN METABOLIC PANEL: CPT | Performed by: INTERNAL MEDICINE

## 2021-01-13 PROCEDURE — 77386: CPT | Performed by: RADIOLOGY

## 2021-01-13 PROCEDURE — 96413 CHEMO IV INFUSION 1 HR: CPT

## 2021-01-13 PROCEDURE — 25010000002 FOSAPREPITANT PER 1 MG: Performed by: INTERNAL MEDICINE

## 2021-01-13 PROCEDURE — 25010000002 MAGNESIUM SULFATE PER 500 MG OF MAGNESIUM: Performed by: INTERNAL MEDICINE

## 2021-01-13 PROCEDURE — 96375 TX/PRO/DX INJ NEW DRUG ADDON: CPT

## 2021-01-13 PROCEDURE — 85025 COMPLETE CBC W/AUTO DIFF WBC: CPT | Performed by: INTERNAL MEDICINE

## 2021-01-13 PROCEDURE — 96367 TX/PROPH/DG ADDL SEQ IV INF: CPT

## 2021-01-13 PROCEDURE — 25010000002 CISPLATIN PER 50 MG: Performed by: INTERNAL MEDICINE

## 2021-01-13 PROCEDURE — 25010000002 DEXAMETHASONE SODIUM PHOSPHATE 100 MG/10ML SOLUTION: Performed by: INTERNAL MEDICINE

## 2021-01-13 RX ORDER — PALONOSETRON 0.05 MG/ML
0.25 INJECTION, SOLUTION INTRAVENOUS ONCE
Status: COMPLETED | OUTPATIENT
Start: 2021-01-13 | End: 2021-01-13

## 2021-01-13 RX ORDER — SODIUM CHLORIDE 9 MG/ML
250 INJECTION, SOLUTION INTRAVENOUS ONCE
Status: COMPLETED | OUTPATIENT
Start: 2021-01-13 | End: 2021-01-13

## 2021-01-13 RX ADMIN — SODIUM CHLORIDE 150 MG: 9 INJECTION, SOLUTION INTRAVENOUS at 12:10

## 2021-01-13 RX ADMIN — SODIUM CHLORIDE 250 ML: 9 INJECTION, SOLUTION INTRAVENOUS at 10:58

## 2021-01-13 RX ADMIN — POTASSIUM CHLORIDE 500 ML: 2 INJECTION, SOLUTION, CONCENTRATE INTRAVENOUS at 10:58

## 2021-01-13 RX ADMIN — POTASSIUM CHLORIDE 500 ML: 2 INJECTION, SOLUTION, CONCENTRATE INTRAVENOUS at 13:50

## 2021-01-13 RX ADMIN — PALONOSETRON 0.25 MG: 0.25 INJECTION, SOLUTION INTRAVENOUS at 12:09

## 2021-01-13 RX ADMIN — CISPLATIN 82 MG: 1 INJECTION INTRAVENOUS at 12:37

## 2021-01-13 RX ADMIN — DEXAMETHASONE SODIUM PHOSPHATE 12 MG: 10 INJECTION, SOLUTION INTRAMUSCULAR; INTRAVENOUS at 12:10

## 2021-01-13 NOTE — PROGRESS NOTES
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  Subsequently he developed adenopathy in his neck especially around his parotid.  He underwent left superficial parotidectomy and left neck dissection  with Dr. Patten  Chemotherapy: Concurrent with radiation / Cisplatin / 7 weekly cycles - 4 cycles completed  Radiation: 66 Gray in 33 fractions using IMRT to spare the remaining oral cavity and uninvolved salivary glands / has completed 15/33 fractions     Weight 199.8 lbs / approximate 7 lbs weight loss since initial start of treatment / 3% weight change    As patient nears the [facility] completion of treatment, he continues to do well.  He is experiencing some fatigue and waning of appetite, but recall indicates intake remains good and he continues to eat a wide variety of foods.  He continues to supplement with Premier shakes x1 per day.  He continues denial of nutrition impact symptoms that interfere with oral intake.  He is extremely diligent in compliance of SLP recommendations for exercises and good mouth hygiene.     Discussed what to expect over the latter half of treatment.  Will continue to follow closely.

## 2021-01-14 ENCOUNTER — HOSPITAL ENCOUNTER (OUTPATIENT)
Dept: SPEECH THERAPY | Facility: HOSPITAL | Age: 76
Setting detail: THERAPIES SERIES
Discharge: HOME | End: 2021-01-14

## 2021-01-14 ENCOUNTER — HOSPITAL ENCOUNTER (OUTPATIENT)
Dept: RADIATION ONCOLOGY | Facility: HOSPITAL | Age: 76
Discharge: HOME | End: 2021-01-14

## 2021-01-14 DIAGNOSIS — C44.229 SQUAMOUS CELL CARCINOMA, EAR, LEFT: Primary | ICD-10-CM

## 2021-01-14 PROCEDURE — 77336 RADIATION PHYSICS CONSULT: CPT | Performed by: RADIOLOGY

## 2021-01-14 PROCEDURE — 92526 ORAL FUNCTION THERAPY: CPT

## 2021-01-14 PROCEDURE — 77386: CPT | Performed by: RADIOLOGY

## 2021-01-14 NOTE — THERAPY PROGRESS REPORT/RE-CERT
Outpatient Speech Language Pathology   Adult Swallow Progress Note  [facility]     Patient Name: Ajith Gallego  : 1945  MRN: [id number]  Today's Date: 2021         Visit Date: 2021   Patient Active Problem List   Diagnosis   • Neck mass   • Squamous cell carcinoma, ear, left   • Prostate cancer (CMS/HCC)        Visit Dx:    ICD-10-CM ICD-9-CM   1. Squamous cell carcinoma, ear, left  C44.229 173.22                         SLP OP Goals     Row Name 21 0800          Goal Type Needed    Goal Type Needed  Dysphagia;Other Adult Goals  -HG        Subjective Comments    Subjective Comments  Pt alert, cooperative, returned with homework folder with tally sheet for exercises.    -HG        Dysphagia Goals    Patient will safely consume the recommended diet without complications such as aspiration pneumonia  NTLs/mech soft diet.  -HG     Status: Patient will safely consume the recommended diet without complications such as aspiration pneumonia  Progressing as expected;Revised  -HG     Comments: Patient will safely consume the recommended diet without complications such as aspiration pneumonia  21: Pt reports no problems with NTLs and mech soft diet and states that he feels his swallowing has improved. Pt is tolerating soft foods, avoiding tough meats. 21: Pt with recent MBS with results: Mild-moderate pharyngeal dysphagia. Penetration/aspiration before/during the swallow w/ thin liquids 2' pre-spill, delayed initiation, and delayed/reduced vestibular closure. Aspiration was completely silent- please see MBS full report.  Diet and liquids changed to mech soft no mixed consistencies and NTLs.   -HG     Comments: Patient will improve oral skills to enhance safety and increase eating efficiency by increasing accuracy of back of tongue control  N/A at this time.  -HG     Patient will improve stage transition duration of swallow/improve timing to reduce food falling into the airway by  decreasing swallow delay after neurosensory  stimulation  super-supraglottic swallow;mendelsohn maneuver;with cues  -HG     Status: Patient will improve stage transition duration of swallow/improve timing to reduce food falling into the airway by decreasing swallow delay after neurosensory  stimulation  Progressing as expected  -HG     Comments: Patient will improve stage transition duration of swallow/improve timing to reduce food falling into the airway by decreasing swallow delay after neurosensory  stimulation  1/14/21: Pt completed both with accuracy x 3.   1/7/21: Pt completed each x 2-3x.   -HG     Patient will increase laryngeal elevation to reduce residue that might fall into airway by completing  Mendelsohn maneuver;super-supraglottic swallow;falsetto/pitch glide;with cues  -HG     Status: Patient will increase laryngeal elevation to reduce residue that might fall into airway by completing  Progressing as expected  -HG     Comments: Patient will increase laryngeal elevation to reduce residue that might fall into airway by completing  1/14/21: Pt completed with accuracy for super-supraglottic swallow and falsetto- Mendelsohn remains a challenge but pt and SLP feel increased elevation. 1/7/21: Pt feels that he has a small shah apple.  Mendelsohn x 3. Falsetto/pitch completed x 3.   -HG     Patient will increase closure of larynx to keep food from falling into the airway by completing  super-supraglottic swallow;with cues  -HG     Status: Patient will increase closure of larynx to keep food from falling into the airway by completing  Progressing as expected  -HG     Comments: Patient will increase closure of larynx to keep food from falling into the airway by completing  1/14/21: Completed with accuracy. 1/7/12: With use of water in session, pt completed super-supraglottic swallow x 2.   -HG     Patient will increase strength of tongue base and posterior pharyngeal walls to reduce residue that might fall  into airway by completing  effotful swallow;Carla (tongue hold);with cues  -HG     Status: Patient will increase strength of tongue base and posterior pharyngeal walls to reduce residue that might fall into airway by completing  Progressing as expected  -HG     Comments: Patient will increase strength of tongue base and posterior pharyngeal walls to reduce residue that might fall into airway by completing  1/14/21: Carla remains difficult but continues to try to complete daily.  1/7/21: No difficulty with effortful swallow. Carla is challenging and pt made 3 attempts during session.   -HG     Patient will improve hyolaryngeal elevation via completing Luis E (head lift)  sustained lift (comment #/duration of lifts);repetitive lift (comment #/duration of lifts);with cues 30 sustained and 15 reps.  -HG     Status: Patient will improve hyolaryngeal elevation via completing Luis E (head lift)  Progressing as expected  -HG     Comments: Patient will improve hyolaryngeal elevation via completing Luis E (head lift)  1/14/21: Pt completing at home on bench at end of bed. 1/7/21: Pt reports timing himself and is able to hold for 35 seconds and completes reps with no neck pain.   -HG        Other Goals    Other Adult Goal- 1  Pt will complete stretching of strap muscles in order to improve ROM.  -HG     Status: Other Adult Goal- 1  Progressing as expected  -HG     Comments: Other Adult Goal- 1  1/14/21: PT reports completing daily. 1/7/21: Pt reports completing stretching exercises.   -HG     Other Adult Goal- 2  Pt will complete MBS with RECs to follow as indicated.   -HG     Status: Other Adult Goal- 2  Progressing as expected  -HG     Comments: Other Adult Goal- 2  1/7/21: MBS results from 1/5/21: Mild-moderate pharyngeal dysphagia. Penetration/aspiration before/during the swallow w/ thin liquids 2' pre-spill, delayed initiation, and delayed/reduced vestibular closure. Aspiration was completely silent.  -HG     Other  Adult Goal- 3  Pt will tolerate water between meals only after oral care.  -HG     Status: Other Adult Goal- 3  Progressing as expected  -HG     Comments: Other Adult Goal- 3  1/14/21: Pt tolerating water between meals and with exercises.   -HG        SLP Time Calculation    SLP Goal Re-Cert Due Date  02/13/21  -HG       User Key  (r) = Recorded By, (t) = Taken By, (c) = Cosigned By    Initials Name Provider Type    Iris Duran MS CCC-SLP Speech and Language Pathologist          OP SLP Education     Row Name 01/14/21 0800       Education    Education Comments  Reviewed all exercises and tolerance of current [facility] soft/NTL diet and trials of water between meals. Encouraged frequency of exercises.   -HG      User Key  (r) = Recorded By, (t) = Taken By, (c) = Cosigned By    Initials Name Effective Dates    Iris Duran, MS CCC-SLP 08/09/20 -           OP SLP Assessment/Plan - 01/14/21 0800        SLP Assessment    Functional Problems  Swallowing   -HG    Impact on Function: Swallowing  Risk of aspiration;Risk of pneumonia   -HG    Clinical Impression: Swallowing  Mild-Moderate:;pharyngeal phase dysphagia   -HG    Functional Problems Comment  Pt reports toleraing NTLs and [facility] soft diet with water trials between meals and after oral care with no s/s of aspiration.   -HG    Clinical Impression Comments  Per MBS on 1/5/21, pt does exhibit pharyngeal dysphagia with aspiration present.   -HG    SLP Diagnosis  Mild to Moderate pharyngeal dysphagia.   -HG    Prognosis  Excellent (comment)   -HG    Patient/caregiver participated in establishment of treatment plan and goals  Yes   -HG    Patient would benefit from skilled therapy intervention  Yes   -HG       SLP Plan    Frequency  1x/week   -HG    Duration  12 weeks   -HG    Planned CPT's?  SLP SWALLOW THERAPY: 28502   -HG    Expected Duration of Therapy Session (SLP Eval)  45   -HG    Plan Comments  Cont with Dysphagia tx.    -HG      User Key  (r) = Recorded  By, (t) = Taken By, (c) = Cosigned By    Initials Name Provider Type     Iris Zhang MS CCC-SLP Speech and Language Pathologist                Time Calculation:   SLP Start Time: 0800    Therapy Charges for Today     Code Description Service Date Service Provider Modifiers Qty    [id number]  ST TREATMENT SWALLOW 2 1/14/2021 Iris Zhang MS CCC-SLP GN 1                   Iris Zhang MS CCC-SLP  1/14/2021

## 2021-01-15 ENCOUNTER — HOSPITAL ENCOUNTER (OUTPATIENT)
Dept: RADIATION ONCOLOGY | Facility: HOSPITAL | Age: 76
Discharge: HOME | End: 2021-01-15

## 2021-01-15 PROCEDURE — 77386: CPT | Performed by: RADIOLOGY

## 2021-01-18 ENCOUNTER — HOSPITAL ENCOUNTER (OUTPATIENT)
Dept: RADIATION ONCOLOGY | Facility: HOSPITAL | Age: 76
Discharge: HOME | End: 2021-01-18

## 2021-01-18 PROCEDURE — 77386: CPT | Performed by: RADIOLOGY

## 2021-01-19 ENCOUNTER — HOSPITAL ENCOUNTER (OUTPATIENT)
Dept: RADIATION ONCOLOGY | Facility: HOSPITAL | Age: 76
Discharge: HOME | End: 2021-01-19

## 2021-01-19 ENCOUNTER — DOCUMENTATION (OUTPATIENT)
Dept: NUTRITION | Facility: HOSPITAL | Age: 76
End: 2021-01-19

## 2021-01-19 VITALS — BODY MASS INDEX: 32.37 KG/M2 | WEIGHT: 194.5 LBS

## 2021-01-19 PROCEDURE — 77386: CPT | Performed by: RADIOLOGY

## 2021-01-19 NOTE — PROGRESS NOTES
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 Subsequently he developed adenopathy in his neck especially around his parotid. He underwent left superficial parotidectomy and left neck dissection  with Dr. Patten  Chemotherapy: Concurrent with radiation / Cisplatin / 7 weekly cycles - 5 cycles completed  Radiation: 66 Gray in 33 fractions using IMRT to spare the remaining oral cavity and uninvolved salivary glands / has completed 20/33 fractions     Weight 194.5 / approximate 12.3 lbs weight loss since initial start of treatment / 6 % weight change    Patient having more difficulty this week with swallowing; he is using the MMW for management of the swallowing issues and plans to get it refilled today for continued use.  He is eating softer foods, but in lesser amounts over the past week as reflected in the weight loss of approximately 5 lbs.  He continues to supplement with ONS (Premeir - 30 gram protein / 160 calories) 3x per day.    Discussed modification of oral intake for very soft, moist consistencies; smaller, more frequent \"grazing\" throughout the day with inclusion of protein at each meal / snack; indication for increasing calorie intake in light of weight loss over the past week.  Fatigue over the past week has also increased; discussed tips for combating.    Will continue to follow.  "

## 2021-01-20 ENCOUNTER — HOSPITAL ENCOUNTER (OUTPATIENT)
Dept: ONCOLOGY | Facility: HOSPITAL | Age: 76
Setting detail: INFUSION SERIES
Discharge: HOME | End: 2021-01-20

## 2021-01-20 ENCOUNTER — HOSPITAL ENCOUNTER (OUTPATIENT)
Dept: RADIATION ONCOLOGY | Facility: HOSPITAL | Age: 76
Discharge: HOME | End: 2021-01-20

## 2021-01-20 VITALS
WEIGHT: 192 LBS | RESPIRATION RATE: 20 BRPM | HEART RATE: 75 BPM | BODY MASS INDEX: 31.99 KG/M2 | SYSTOLIC BLOOD PRESSURE: 133 MMHG | HEIGHT: 65 IN | TEMPERATURE: 97.2 F | DIASTOLIC BLOOD PRESSURE: 69 MMHG

## 2021-01-20 DIAGNOSIS — C44.229 SQUAMOUS CELL CARCINOMA, EAR, LEFT: Primary | ICD-10-CM

## 2021-01-20 LAB
ANION GAP SERPL CALCULATED.3IONS-SCNC: 12 MMOL/L (ref 5–15)
BUN SERPL-MCNC: 42 MG/DL (ref 8–23)
BUN/CREAT SERPL: 35.9 (ref 7–25)
CALCIUM SPEC-SCNC: 9.3 MG/DL (ref 8.6–10.5)
CHLORIDE SERPL-SCNC: 104 MMOL/L (ref 98–107)
CO2 SERPL-SCNC: 21 MMOL/L (ref 22–29)
CREAT BLDA-MCNC: 1.2 MG/DL (ref 0.6–1.3)
CREAT SERPL-MCNC: 1.17 MG/DL (ref 0.76–1.27)
ERYTHROCYTE [DISTWIDTH] IN BLOOD BY AUTOMATED COUNT: 13 % (ref 12.3–15.4)
GFR SERPL CREATININE-BSD FRML MDRD: 61 ML/MIN/1.73
GLUCOSE SERPL-MCNC: 104 MG/DL (ref 65–99)
HCT VFR BLD AUTO: 29.5 % (ref 37.5–51)
HGB BLD-MCNC: 9.7 G/DL (ref 13–17.7)
LYMPHOCYTES # BLD AUTO: 0.6 10*3/MM3 (ref 0.7–3.1)
LYMPHOCYTES NFR BLD AUTO: 29.6 % (ref 19.6–45.3)
MAGNESIUM SERPL-MCNC: 1.6 MG/DL (ref 1.6–2.4)
MCH RBC QN AUTO: 30.3 PG (ref 26.6–33)
MCHC RBC AUTO-ENTMCNC: 33 G/DL (ref 31.5–35.7)
MCV RBC AUTO: 91.6 FL (ref 79–97)
MONOCYTES # BLD AUTO: 0.2 10*3/MM3 (ref 0.1–0.9)
MONOCYTES NFR BLD AUTO: 10.3 % (ref 5–12)
NEUTROPHILS NFR BLD AUTO: 1.3 10*3/MM3 (ref 1.7–7)
NEUTROPHILS NFR BLD AUTO: 60.1 % (ref 42.7–76)
PLATELET # BLD AUTO: 71 10*3/MM3 (ref 140–450)
PMV BLD AUTO: 5.5 FL (ref 6–12)
POTASSIUM SERPL-SCNC: 4.4 MMOL/L (ref 3.5–5.2)
RBC # BLD AUTO: 3.22 10*6/MM3 (ref 4.14–5.8)
SODIUM SERPL-SCNC: 137 MMOL/L (ref 136–145)
WBC # BLD AUTO: 2.1 10*3/MM3 (ref 3.4–10.8)

## 2021-01-20 PROCEDURE — 25010000002 PALONOSETRON 0.25 MG/5ML SOLUTION PREFILLED SYRINGE: Performed by: INTERNAL MEDICINE

## 2021-01-20 PROCEDURE — 25010000002 DEXAMETHASONE SODIUM PHOSPHATE 100 MG/10ML SOLUTION: Performed by: INTERNAL MEDICINE

## 2021-01-20 PROCEDURE — 85025 COMPLETE CBC W/AUTO DIFF WBC: CPT | Performed by: INTERNAL MEDICINE

## 2021-01-20 PROCEDURE — 96375 TX/PRO/DX INJ NEW DRUG ADDON: CPT

## 2021-01-20 PROCEDURE — 96367 TX/PROPH/DG ADDL SEQ IV INF: CPT

## 2021-01-20 PROCEDURE — 96366 THER/PROPH/DIAG IV INF ADDON: CPT

## 2021-01-20 PROCEDURE — 82565 ASSAY OF CREATININE: CPT

## 2021-01-20 PROCEDURE — 25010000002 MAGNESIUM SULFATE PER 500 MG OF MAGNESIUM: Performed by: INTERNAL MEDICINE

## 2021-01-20 PROCEDURE — 25010000002 POTASSIUM CHLORIDE PER 2 MEQ OF POTASSIUM: Performed by: INTERNAL MEDICINE

## 2021-01-20 PROCEDURE — 77386: CPT | Performed by: RADIOLOGY

## 2021-01-20 PROCEDURE — 80048 BASIC METABOLIC PNL TOTAL CA: CPT | Performed by: INTERNAL MEDICINE

## 2021-01-20 PROCEDURE — 25010000002 CISPLATIN PER 50 MG: Performed by: INTERNAL MEDICINE

## 2021-01-20 PROCEDURE — 96413 CHEMO IV INFUSION 1 HR: CPT

## 2021-01-20 PROCEDURE — 25010000002 FOSAPREPITANT PER 1 MG: Performed by: INTERNAL MEDICINE

## 2021-01-20 PROCEDURE — 83735 ASSAY OF MAGNESIUM: CPT | Performed by: INTERNAL MEDICINE

## 2021-01-20 RX ORDER — SODIUM CHLORIDE 9 MG/ML
250 INJECTION, SOLUTION INTRAVENOUS ONCE
Status: COMPLETED | OUTPATIENT
Start: 2021-01-20 | End: 2021-01-20

## 2021-01-20 RX ORDER — PALONOSETRON 0.05 MG/ML
0.25 INJECTION, SOLUTION INTRAVENOUS ONCE
Status: COMPLETED | OUTPATIENT
Start: 2021-01-20 | End: 2021-01-20

## 2021-01-20 RX ADMIN — DEXAMETHASONE SODIUM PHOSPHATE 12 MG: 10 INJECTION, SOLUTION INTRAMUSCULAR; INTRAVENOUS at 11:36

## 2021-01-20 RX ADMIN — SODIUM CHLORIDE 250 ML: 9 INJECTION, SOLUTION INTRAVENOUS at 10:15

## 2021-01-20 RX ADMIN — CISPLATIN 82 MG: 1 INJECTION INTRAVENOUS at 12:16

## 2021-01-20 RX ADMIN — POTASSIUM CHLORIDE 500 ML: 2 INJECTION, SOLUTION, CONCENTRATE INTRAVENOUS at 10:15

## 2021-01-20 RX ADMIN — PALONOSETRON 0.25 MG: 0.25 INJECTION, SOLUTION INTRAVENOUS at 11:32

## 2021-01-20 RX ADMIN — SODIUM CHLORIDE 150 MG: 9 INJECTION, SOLUTION INTRAVENOUS at 11:36

## 2021-01-20 RX ADMIN — POTASSIUM CHLORIDE 500 ML: 2 INJECTION, SOLUTION, CONCENTRATE INTRAVENOUS at 13:35

## 2021-01-21 ENCOUNTER — HOSPITAL ENCOUNTER (OUTPATIENT)
Dept: RADIATION ONCOLOGY | Facility: HOSPITAL | Age: 76
Discharge: HOME | End: 2021-01-21

## 2021-01-21 PROCEDURE — 77336 RADIATION PHYSICS CONSULT: CPT | Performed by: RADIOLOGY

## 2021-01-21 PROCEDURE — 77386: CPT | Performed by: RADIOLOGY

## 2021-01-22 ENCOUNTER — HOSPITAL ENCOUNTER (OUTPATIENT)
Dept: SPEECH THERAPY | Facility: HOSPITAL | Age: 76
Setting detail: THERAPIES SERIES
Discharge: HOME | End: 2021-01-22

## 2021-01-22 ENCOUNTER — HOSPITAL ENCOUNTER (OUTPATIENT)
Dept: RADIATION ONCOLOGY | Facility: HOSPITAL | Age: 76
Discharge: HOME | End: 2021-01-22

## 2021-01-22 DIAGNOSIS — C44.229 SQUAMOUS CELL CARCINOMA, EAR, LEFT: Primary | ICD-10-CM

## 2021-01-22 PROCEDURE — 77386: CPT | Performed by: RADIOLOGY

## 2021-01-22 PROCEDURE — 92526 ORAL FUNCTION THERAPY: CPT

## 2021-01-22 NOTE — THERAPY TREATMENT NOTE
Outpatient Speech Language Pathology   Adult Swallow Treatment Note  [facility]     Patient Name: Ajith Gallego  : 1945  MRN: [id number]  Today's Date: 2021         Visit Date: 2021   Patient Active Problem List   Diagnosis   • Neck mass   • Squamous cell carcinoma, ear, left   • Prostate cancer (CMS/HCC)        Visit Dx:    ICD-10-CM ICD-9-CM   1. Squamous cell carcinoma, ear, left  C44.229 173.22                         SLP OP Goals     Row Name 21 0810          Goal Type Needed    Goal Type Needed  Dysphagia;Other Adult Goals  -HG        Subjective Comments    Subjective Comments  Pt alert, cooperative, returned with homework packet. Pt notes that his throat is more sore and a little hoarse.  Reports that his mouth is sore.   -HG        Dysphagia Goals    Patient will safely consume the recommended diet without complications such as aspiration pneumonia  NTLs/mech soft diet.  -HG     Status: Patient will safely consume the recommended diet without complications such as aspiration pneumonia  Progressing as expected;Revised  -HG     Comments: Patient will safely consume the recommended diet without complications such as aspiration pneumonia  21: Pt continues to drink NTLs at meals. Pt is actually tolerated soups with no difficulty (no choking or coughing). 21: Pt reports no problems with NTLs and mech soft diet and states that he feels his swallowing has improved. Pt is tolerating soft foods, avoiding tough meats. 21: Pt with recent MBS with results: Mild-moderate pharyngeal dysphagia. Penetration/aspiration before/during the swallow w/ thin liquids 2' pre-spill, delayed initiation, and delayed/reduced vestibular closure. Aspiration was completely silent- please see MBS full report.  Diet and liquids changed to mech soft no mixed consistencies and NTLs.   -HG     Comments: Patient will improve oral skills to enhance safety and increase eating efficiency by increasing  accuracy of back of tongue control  N/A at this time.  -HG     Patient will improve stage transition duration of swallow/improve timing to reduce food falling into the airway by decreasing swallow delay after neurosensory  stimulation  super-supraglottic swallow;mendelsohn maneuver;with cues  -HG     Status: Patient will improve stage transition duration of swallow/improve timing to reduce food falling into the airway by decreasing swallow delay after neurosensory  stimulation  Progressing as expected  -HG     Comments: Patient will improve stage transition duration of swallow/improve timing to reduce food falling into the airway by decreasing swallow delay after neurosensory  stimulation  1/22/21: Pt completed each with 100% accurate x 5.  1/14/21: Pt completed both with accuracy x 3.   1/7/21: Pt completed each x 2-3x.   -HG     Patient will increase laryngeal elevation to reduce residue that might fall into airway by completing  Mendelsohn maneuver;super-supraglottic swallow;falsetto/pitch glide;with cues  -HG     Status: Patient will increase laryngeal elevation to reduce residue that might fall into airway by completing  Progressing as expected  -HG     Comments: Patient will increase laryngeal elevation to reduce residue that might fall into airway by completing  1/22/21: Pt completed x 5.  1/14/21: Pt completed with accuracy for super-supraglottic swallow and falsetto- Mendelsohn remains a challenge but pt and SLP feel increased elevation. 1/7/21: Pt feels that he has a small shah apple.  Mendelsohn x 3. Falsetto/pitch completed x 3.   -HG     Patient will increase closure of larynx to keep food from falling into the airway by completing  super-supraglottic swallow;with cues  -HG     Status: Patient will increase closure of larynx to keep food from falling into the airway by completing  Progressing as expected  -HG     Comments: Patient will increase closure of larynx to keep food from falling into the  airway by completing  1/22/21: Pt completed x 5. 1/14/21: Completed with accuracy. 1/7/12: With use of water in session, pt completed super-supraglottic swallow x 2.   -HG     Patient will increase strength of tongue base and posterior pharyngeal walls to reduce residue that might fall into airway by completing  effotful swallow;Carla (tongue hold);with cues  -HG     Status: Patient will increase strength of tongue base and posterior pharyngeal walls to reduce residue that might fall into airway by completing  Progressing as expected  -HG     Comments: Patient will increase strength of tongue base and posterior pharyngeal walls to reduce residue that might fall into airway by completing  1/22/21: Pt completed x 3. 1/14/21: Carla remains difficult but continues to try to complete daily.  1/7/21: No difficulty with effortful swallow. Carla is challenging and pt made 3 attempts during session.   -HG     Patient will improve hyolaryngeal elevation via completing Luis E (head lift)  sustained lift (comment #/duration of lifts);repetitive lift (comment #/duration of lifts);with cues 30 sustained and 15 reps.  -HG     Status: Patient will improve hyolaryngeal elevation via completing Luis E (head lift)  Progressing as expected  -HG     Comments: Patient will improve hyolaryngeal elevation via completing Luis E (head lift)  1/22/21:  Pt reports completing at home. 1/14/21: Pt completing at home on bench at end of bed. 1/7/21: Pt reports timing himself and is able to hold for 35 seconds and completes reps with no neck pain.   -HG        Other Goals    Other Adult Goal- 1  Pt will complete stretching of strap muscles in order to improve ROM.  -HG     Status: Other Adult Goal- 1  Progressing as expected  -HG     Comments: Other Adult Goal- 1  1/22/21: Pt is completing stretching exercise at home. 1/14/21: PT reports completing daily. 1/7/21: Pt reports completing stretching exercises.   -HG     Other Adult Goal- 2  Pt will  complete MBS with RECs to follow as indicated.   -HG     Status: Other Adult Goal- 2  Progressing as expected  -HG     Comments: Other Adult Goal- 2  1/22/21: Discussed repeat MBS at conclusion of treatment post 3-4 weeks. 1/7/21: MBS results from 1/5/21: Mild-moderate pharyngeal dysphagia. Penetration/aspiration before/during the swallow w/ thin liquids 2' pre-spill, delayed initiation, and delayed/reduced vestibular closure. Aspiration was completely silent.  -HG     Other Adult Goal- 3  Pt will tolerate water between meals only after oral care.  -HG     Status: Other Adult Goal- 3  Progressing as expected  -HG     Comments: Other Adult Goal- 3  1/22/21: Pt tolerated several cup drinks of water with no s/s of aspiration. Pt used water with all of his exercises this date. 1/14/21: Pt tolerating water between meals and with exercises.   -HG        SLP Time Calculation    SLP Goal Re-Cert Due Date  02/13/21  -HG       User Key  (r) = Recorded By, (t) = Taken By, (c) = Cosigned By    Initials Name Provider Type    Iris Duran MS CCC-SLP Speech and Language Pathologist          OP SLP Education     Row Name 01/22/21 0810       Education    Education Comments  Education regarding plan for cont'd treatment even after pt is finished with radiation tx.   -HG      User Key  (r) = Recorded By, (t) = Taken By, (c) = Cosigned By    Initials Name Effective Dates    Iris Duran MS CCC-SLP 08/09/20 -           OP SLP Assessment/Plan - 01/22/21 0810        SLP Plan    Plan Comments  Cont with Dysphagia tx.    -HG      User Key  (r) = Recorded By, (t) = Taken By, (c) = Cosigned By    Initials Name Provider Type    Iris Duran MS CCC-SLP Speech and Language Pathologist                Time Calculation:   SLP Start Time: 0810    Therapy Charges for Today     Code Description Service Date Service Provider Modifiers Qty    13590564815 HC ST TREATMENT SWALLOW 2 1/22/2021 Iris Zhang MS CCC-SLP GN 1                    Iris Zhang, MS CCC-SLP  1/22/2021

## 2021-01-25 ENCOUNTER — HOSPITAL ENCOUNTER (OUTPATIENT)
Dept: RADIATION ONCOLOGY | Facility: HOSPITAL | Age: 76
Discharge: HOME | End: 2021-01-25

## 2021-01-25 PROCEDURE — 77386: CPT | Performed by: RADIOLOGY

## 2021-01-26 ENCOUNTER — DOCUMENTATION (OUTPATIENT)
Dept: NUTRITION | Facility: HOSPITAL | Age: 76
End: 2021-01-26

## 2021-01-26 ENCOUNTER — HOSPITAL ENCOUNTER (OUTPATIENT)
Dept: RADIATION ONCOLOGY | Facility: HOSPITAL | Age: 76
Discharge: HOME | End: 2021-01-26

## 2021-01-26 VITALS — WEIGHT: 190.7 LBS | BODY MASS INDEX: 31.73 KG/M2

## 2021-01-26 PROCEDURE — 77386: CPT | Performed by: RADIOLOGY

## 2021-01-26 NOTE — PROGRESS NOTES
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 Subsequently he developed adenopathy in his neck especially around his parotid. He underwent left superficial parotidectomy and left neck dissection  with Dr. Patten  Chemotherapy: Concurrent with radiation / Cisplatin / 7 weekly cycles - 6 cycles completed / completes chemo tomorrow  Radiation: 66 [place] in 33 fractions using IMRT to spare the remaining oral cavity and uninvolved salivary glands / has completed 25/33 fractions     Weight 190.7 / approximate 16.8 lbs weight loss since initial start of treatment / 8 % weight change    Patient nearing completion of treatment with 8 remaining fractions.  He states that he is feeling a lot more fatigue this week, but continues to maintain fairly good intake.  He is modifying consistencies and textures of foods for ease of swallowing; uses the MMW fairly often.  Continues to supplement with 2-3 Boost supplements per day.  Discussed what to expect for the 2 weeks following completion of treatment.

## 2021-01-27 ENCOUNTER — HOSPITAL ENCOUNTER (OUTPATIENT)
Dept: ONCOLOGY | Facility: HOSPITAL | Age: 76
Setting detail: INFUSION SERIES
Discharge: HOME | End: 2021-01-27

## 2021-01-27 ENCOUNTER — HOSPITAL ENCOUNTER (OUTPATIENT)
Dept: RADIATION ONCOLOGY | Facility: HOSPITAL | Age: 76
Discharge: HOME | End: 2021-01-27

## 2021-01-27 VITALS
DIASTOLIC BLOOD PRESSURE: 59 MMHG | RESPIRATION RATE: 20 BRPM | HEART RATE: 92 BPM | TEMPERATURE: 98.9 F | SYSTOLIC BLOOD PRESSURE: 126 MMHG | BODY MASS INDEX: 32.02 KG/M2 | WEIGHT: 192.2 LBS | HEIGHT: 65 IN

## 2021-01-27 DIAGNOSIS — C44.229 SQUAMOUS CELL CARCINOMA, EAR, LEFT: Primary | ICD-10-CM

## 2021-01-27 LAB
ANION GAP SERPL CALCULATED.3IONS-SCNC: 12 MMOL/L (ref 5–15)
BUN SERPL-MCNC: 32 MG/DL (ref 8–23)
BUN/CREAT SERPL: 24.8 (ref 7–25)
CALCIUM SPEC-SCNC: 9.1 MG/DL (ref 8.6–10.5)
CHLORIDE SERPL-SCNC: 99 MMOL/L (ref 98–107)
CO2 SERPL-SCNC: 22 MMOL/L (ref 22–29)
CREAT BLDA-MCNC: 1.4 MG/DL
CREAT BLDA-MCNC: 1.4 MG/DL (ref 0.6–1.3)
CREAT SERPL-MCNC: 1.29 MG/DL (ref 0.76–1.27)
ERYTHROCYTE [DISTWIDTH] IN BLOOD BY AUTOMATED COUNT: 12.5 % (ref 12.3–15.4)
GFR SERPL CREATININE-BSD FRML MDRD: 54 ML/MIN/1.73
GLUCOSE SERPL-MCNC: 112 MG/DL (ref 65–99)
HCT VFR BLD AUTO: 27.9 % (ref 37.5–51)
HGB BLD-MCNC: 9.4 G/DL (ref 13–17.7)
LYMPHOCYTES # BLD AUTO: 0.6 10*3/MM3 (ref 0.7–3.1)
LYMPHOCYTES NFR BLD AUTO: 15.2 % (ref 19.6–45.3)
MAGNESIUM SERPL-MCNC: 1.6 MG/DL (ref 1.6–2.4)
MCH RBC QN AUTO: 30.3 PG (ref 26.6–33)
MCHC RBC AUTO-ENTMCNC: 33.7 G/DL (ref 31.5–35.7)
MCV RBC AUTO: 89.7 FL (ref 79–97)
MONOCYTES # BLD AUTO: 0.4 10*3/MM3 (ref 0.1–0.9)
MONOCYTES NFR BLD AUTO: 8.9 % (ref 5–12)
NEUTROPHILS NFR BLD AUTO: 3.1 10*3/MM3 (ref 1.7–7)
NEUTROPHILS NFR BLD AUTO: 75.9 % (ref 42.7–76)
PLATELET # BLD AUTO: 124 10*3/MM3 (ref 140–450)
PMV BLD AUTO: 5.6 FL (ref 6–12)
POTASSIUM SERPL-SCNC: 4.3 MMOL/L (ref 3.5–5.2)
RBC # BLD AUTO: 3.11 10*6/MM3 (ref 4.14–5.8)
SODIUM SERPL-SCNC: 133 MMOL/L (ref 136–145)
WBC # BLD AUTO: 4.1 10*3/MM3 (ref 3.4–10.8)

## 2021-01-27 PROCEDURE — 80048 BASIC METABOLIC PNL TOTAL CA: CPT | Performed by: INTERNAL MEDICINE

## 2021-01-27 PROCEDURE — 25010000002 MAGNESIUM SULFATE PER 500 MG OF MAGNESIUM: Performed by: INTERNAL MEDICINE

## 2021-01-27 PROCEDURE — 25010000002 PALONOSETRON 0.25 MG/5ML SOLUTION PREFILLED SYRINGE: Performed by: INTERNAL MEDICINE

## 2021-01-27 PROCEDURE — 25010000002 POTASSIUM CHLORIDE PER 2 MEQ OF POTASSIUM: Performed by: INTERNAL MEDICINE

## 2021-01-27 PROCEDURE — 96413 CHEMO IV INFUSION 1 HR: CPT

## 2021-01-27 PROCEDURE — 96366 THER/PROPH/DIAG IV INF ADDON: CPT

## 2021-01-27 PROCEDURE — 77386: CPT | Performed by: RADIOLOGY

## 2021-01-27 PROCEDURE — 25010000002 CISPLATIN PER 50 MG: Performed by: INTERNAL MEDICINE

## 2021-01-27 PROCEDURE — 96367 TX/PROPH/DG ADDL SEQ IV INF: CPT

## 2021-01-27 PROCEDURE — 96375 TX/PRO/DX INJ NEW DRUG ADDON: CPT

## 2021-01-27 PROCEDURE — 85025 COMPLETE CBC W/AUTO DIFF WBC: CPT | Performed by: INTERNAL MEDICINE

## 2021-01-27 PROCEDURE — 83735 ASSAY OF MAGNESIUM: CPT | Performed by: INTERNAL MEDICINE

## 2021-01-27 PROCEDURE — 82565 ASSAY OF CREATININE: CPT

## 2021-01-27 PROCEDURE — 25010000002 DEXAMETHASONE SODIUM PHOSPHATE 100 MG/10ML SOLUTION: Performed by: INTERNAL MEDICINE

## 2021-01-27 PROCEDURE — 25010000002 FOSAPREPITANT PER 1 MG: Performed by: INTERNAL MEDICINE

## 2021-01-27 RX ORDER — SODIUM CHLORIDE 9 MG/ML
250 INJECTION, SOLUTION INTRAVENOUS ONCE
Status: COMPLETED | OUTPATIENT
Start: 2021-01-27 | End: 2021-01-27

## 2021-01-27 RX ORDER — PALONOSETRON 0.05 MG/ML
0.25 INJECTION, SOLUTION INTRAVENOUS ONCE
Status: COMPLETED | OUTPATIENT
Start: 2021-01-27 | End: 2021-01-27

## 2021-01-27 RX ADMIN — SODIUM CHLORIDE 150 MG: 9 INJECTION, SOLUTION INTRAVENOUS at 11:21

## 2021-01-27 RX ADMIN — POTASSIUM CHLORIDE 500 ML: 2 INJECTION, SOLUTION, CONCENTRATE INTRAVENOUS at 10:13

## 2021-01-27 RX ADMIN — DEXAMETHASONE SODIUM PHOSPHATE 12 MG: 10 INJECTION, SOLUTION INTRAMUSCULAR; INTRAVENOUS at 11:21

## 2021-01-27 RX ADMIN — CISPLATIN 82 MG: 1 INJECTION INTRAVENOUS at 12:07

## 2021-01-27 RX ADMIN — PALONOSETRON 0.25 MG: 0.25 INJECTION, SOLUTION INTRAVENOUS at 11:19

## 2021-01-27 RX ADMIN — SODIUM CHLORIDE 250 ML: 9 INJECTION, SOLUTION INTRAVENOUS at 10:13

## 2021-01-27 RX ADMIN — POTASSIUM CHLORIDE 500 ML: 2 INJECTION, SOLUTION, CONCENTRATE INTRAVENOUS at 13:14

## 2021-01-28 ENCOUNTER — HOSPITAL ENCOUNTER (OUTPATIENT)
Dept: RADIATION ONCOLOGY | Facility: HOSPITAL | Age: 76
Discharge: HOME | End: 2021-01-28

## 2021-01-28 PROCEDURE — 77386: CPT | Performed by: RADIOLOGY

## 2021-01-29 ENCOUNTER — HOSPITAL ENCOUNTER (OUTPATIENT)
Dept: RADIATION ONCOLOGY | Facility: HOSPITAL | Age: 76
Discharge: HOME | End: 2021-01-29

## 2021-01-29 ENCOUNTER — HOSPITAL ENCOUNTER (OUTPATIENT)
Dept: SPEECH THERAPY | Facility: HOSPITAL | Age: 76
Setting detail: THERAPIES SERIES
Discharge: HOME | End: 2021-01-29

## 2021-01-29 DIAGNOSIS — C44.229 SQUAMOUS CELL CARCINOMA, EAR, LEFT: Primary | ICD-10-CM

## 2021-01-29 PROCEDURE — 92526 ORAL FUNCTION THERAPY: CPT

## 2021-01-29 PROCEDURE — 77336 RADIATION PHYSICS CONSULT: CPT | Performed by: RADIOLOGY

## 2021-01-29 PROCEDURE — 77386: CPT | Performed by: RADIOLOGY

## 2021-01-29 NOTE — THERAPY TREATMENT NOTE
"Outpatient Speech Language Pathology   Adult Swallow Treatment Note  [facility]     Patient Name: Ajith Gallego  : 1945  MRN: [id number]  Today's Date: 2021         Visit Date: 2021   Patient Active Problem List   Diagnosis   • Neck mass   • Squamous cell carcinoma, ear, left   • Prostate cancer (CMS/HCC)        Visit Dx:    ICD-10-CM ICD-9-CM   1. Squamous cell carcinoma, ear, left  C44.229 173.22                         SLP OP Goals     Row Name 21 0800          Goal Type Needed    Goal Type Needed  Dysphagia;Other Adult Goals  -HG        Subjective Comments    Subjective Comments  Pt alert, cooperative, returned with a sore throat but able to swallow. Pt used his mouthwash this morning.   -HG        Dysphagia Goals    Patient will safely consume the recommended diet without complications such as aspiration pneumonia  NTLs/mech soft diet.  -HG     Status: Patient will safely consume the recommended diet without complications such as aspiration pneumonia  Progressing as expected;Revised  -HG     Comments: Patient will safely consume the recommended diet without complications such as aspiration pneumonia  21: Pt continues to tolerate NTLs at meals with \"no issues\". Pt eating cream soups, cream of corn, chicken noodle soup with extra chicken, corn bread and buttermilk, yogurt. Meats are too dry unless with cream of gravy and pt stated, \"my mouth is too dry and I just have to chew and chew.\" Pt tolerating puddings and ice cream. Pt is also drinking a supplement drink 3x/day.  21: Pt continues to drink NTLs at meals. Pt is actually tolerated soups with no difficulty (no choking or coughing). 21: Pt reports no problems with NTLs and mech soft diet and states that he feels his swallowing has improved. Pt is tolerating soft foods, avoiding tough meats. 21: Pt with recent MBS with results: Mild-moderate pharyngeal dysphagia. Penetration/aspiration before/during the swallow " w/ thin liquids 2' pre-spill, delayed initiation, and delayed/reduced vestibular closure. Aspiration was completely silent- please see MBS full report.  Diet and liquids changed to mech soft no mixed consistencies and NTLs.   -HG     Comments: Patient will improve oral skills to enhance safety and increase eating efficiency by increasing accuracy of back of tongue control  N/A at this time.  -HG     Patient will improve stage transition duration of swallow/improve timing to reduce food falling into the airway by decreasing swallow delay after neurosensory  stimulation  super-supraglottic swallow;mendelsohn maneuver;with cues  -HG     Status: Patient will improve stage transition duration of swallow/improve timing to reduce food falling into the airway by decreasing swallow delay after neurosensory  stimulation  Progressing as expected  -HG     Comments: Patient will improve stage transition duration of swallow/improve timing to reduce food falling into the airway by decreasing swallow delay after neurosensory  stimulation  1/29/21: Pt completed with accuracy during session.  1/22/21: Pt completed each with 100% accurate x 5.  1/14/21: Pt completed both with accuracy x 3.   1/7/21: Pt completed each x 2-3x.   -HG     Patient will increase laryngeal elevation to reduce residue that might fall into airway by completing  Mendelsohn maneuver;super-supraglottic swallow;falsetto/pitch glide;with cues  -HG     Status: Patient will increase laryngeal elevation to reduce residue that might fall into airway by completing  Progressing as expected  -HG     Comments: Patient will increase laryngeal elevation to reduce residue that might fall into airway by completing  1/29/21: Pt completed with accuracy during session.  1/22/21: Pt completed x 5.  1/14/21: Pt completed with accuracy for super-supraglottic swallow and falsetto- Mendelsohn remains a challenge but pt and SLP feel increased elevation. 1/7/21: Pt feels that he has a  small shah apple.  Mendelsohn x 3. Falsetto/pitch completed x 3.   -HG     Patient will increase closure of larynx to keep food from falling into the airway by completing  super-supraglottic swallow;with cues  -HG     Status: Patient will increase closure of larynx to keep food from falling into the airway by completing  Progressing as expected  -HG     Comments: Patient will increase closure of larynx to keep food from falling into the airway by completing  1/29/21: Pt completed with accuracy during session.  1/22/21: Pt completed x 5. 1/14/21: Completed with accuracy. 1/7/12: With use of water in session, pt completed super-supraglottic swallow x 2.   -HG     Patient will increase strength of tongue base and posterior pharyngeal walls to reduce residue that might fall into airway by completing  effotful swallow;Carla (tongue hold);with cues  -HG     Status: Patient will increase strength of tongue base and posterior pharyngeal walls to reduce residue that might fall into airway by completing  Progressing as expected  -HG     Comments: Patient will increase strength of tongue base and posterior pharyngeal walls to reduce residue that might fall into airway by completing  1/29/21: Pt completed with accuracy during session.  1/22/21: Pt completed x 3. 1/14/21: Carla remains difficult but continues to try to complete daily.  1/7/21: No difficulty with effortful swallow. Carla is challenging and pt made 3 attempts during session.   -HG     Patient will improve hyolaryngeal elevation via completing Luis E (head lift)  sustained lift (comment #/duration of lifts);repetitive lift (comment #/duration of lifts);with cues 30 sustained and 15 reps.  -HG     Status: Patient will improve hyolaryngeal elevation via completing Luis E (head lift)  Progressing as expected  -HG     Comments: Patient will improve hyolaryngeal elevation via completing Luis E (head lift)  1/29/21:Pt reports completing those at home and using a  timer to hold for full 30 secs and completes the repetitive lifts with no difficulties.  1/22/21:  Pt reports completing at home. 1/14/21: Pt completing at home on bench at end of bed. 1/7/21: Pt reports timing himself and is able to hold for 35 seconds and completes reps with no neck pain.   -HG        Other Goals    Other Adult Goal- 1  Pt will complete stretching of strap muscles in order to improve ROM.  -HG     Status: Other Adult Goal- 1  Progressing as expected  -HG     Comments: Other Adult Goal- 1  1/29/21: Pt completes at home daily.  1/22/21: Pt is completing stretching exercise at home. 1/14/21: PT reports completing daily. 1/7/21: Pt reports completing stretching exercises.   -HG     Other Adult Goal- 2  Pt will complete MBS with RECs to follow as indicated.   -HG     Status: Other Adult Goal- 2  Progressing as expected  -HG     Comments: Other Adult Goal- 2  1/22/21: Discussed repeat MBS at conclusion of treatment post 3-4 weeks. 1/7/21: MBS results from 1/5/21: Mild-moderate pharyngeal dysphagia. Penetration/aspiration before/during the swallow w/ thin liquids 2' pre-spill, delayed initiation, and delayed/reduced vestibular closure. Aspiration was completely silent.  -HG     Other Adult Goal- 3  Pt will tolerate water between meals only after oral care.  -HG     Status: Other Adult Goal- 3  Progressing as expected  -HG     Comments: Other Adult Goal- 3  1/29/21: Pt tolerated water during session with only one cough out of 5 drinks during session while doing exercises.   1/22/21: Pt tolerated several cup drinks of water with no s/s of aspiration. Pt used water with all of his exercises this date. 1/14/21: Pt tolerating water between meals and with exercises.   -HG        SLP Time Calculation    SLP Goal Re-Cert Due Date  02/13/21  -HG       User Key  (r) = Recorded By, (t) = Taken By, (c) = Cosigned By    Initials Name Provider Type    Iris Duran MS CCC-SLP Speech and Language Pathologist           OP SLP Education     Row Name 01/29/21 0800       Education    Education Comments  Education for plan of care and to repeat MBS ~3 weeks post tx to determine if pt can return to thin liquids.   -      User Key  (r) = Recorded By, (t) = Taken By, (c) = Cosigned By    Initials Name Effective Dates    Iris Duran MS CCC-SLP 08/09/20 -           OP SLP Assessment/Plan - 01/29/21 0800        SLP Plan    Plan Comments  Cont with Dysphagia tx.    -      User Key  (r) = Recorded By, (t) = Taken By, (c) = Cosigned By    Initials Name Provider Type    Iris Duran MS CCC-SLP Speech and Language Pathologist                Time Calculation:   SLP Start Time: 0800    Therapy Charges for Today     Code Description Service Date Service Provider Modifiers Qty    93851374514 HC ST TREATMENT SWALLOW 2 1/29/2021 Iris Zhang MS CCC-SLP GN 1                   Iris Zhang MS CCC-SLP  1/29/2021

## 2021-02-01 ENCOUNTER — HOSPITAL ENCOUNTER (OUTPATIENT)
Dept: RADIATION ONCOLOGY | Facility: HOSPITAL | Age: 76
Discharge: HOME | End: 2021-02-01

## 2021-02-01 ENCOUNTER — HOSPITAL ENCOUNTER (OUTPATIENT)
Dept: RADIATION ONCOLOGY | Facility: HOSPITAL | Age: 76
Setting detail: RADIATION/ONCOLOGY SERIES
Discharge: HOME | End: 2021-02-01

## 2021-02-01 PROCEDURE — 77386: CPT | Performed by: RADIOLOGY

## 2021-02-02 ENCOUNTER — HOSPITAL ENCOUNTER (OUTPATIENT)
Dept: RADIATION ONCOLOGY | Facility: HOSPITAL | Age: 76
Discharge: HOME | End: 2021-02-02

## 2021-02-02 ENCOUNTER — DOCUMENTATION (OUTPATIENT)
Dept: NUTRITION | Facility: HOSPITAL | Age: 76
End: 2021-02-02

## 2021-02-02 VITALS — BODY MASS INDEX: 31.3 KG/M2 | WEIGHT: 188.1 LBS

## 2021-02-02 PROCEDURE — 77386: CPT | Performed by: RADIOLOGY

## 2021-02-02 NOTE — PROGRESS NOTES
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 Subsequently he developed adenopathy in his neck especially around his parotid. He underwent left superficial parotidectomy and left neck dissection  with Dr. Patten  Chemotherapy: Concurrent with radiation / Cisplatin / 7 weekly cycles - 6 cycles completed   Radiation: 66 Gray in 33 fractions using IMRT to spare the remaining oral cavity and uninvolved salivary glands / has completed 30/33 fractions     Weight 188.1 / approximate 18.7 lbs weight loss since initial start of treatment / 9 % weight change    As patient nears completion of treatment, he continues to manage the nutritional impact symptoms of treatment well.  He is eating very soft / liquid consistencies;  minimal difficulty swallowing that is managed well with MMW.  Continues to supplement oral intake with at least 2 nutritional supplements per day; encouraged patient to increase to 3 servings per day to prevent additional weight loss and support nutritional status during the next couple of weeks following completion of treatment.  Discussed what he might expect as far as nutritional impact symptoms worsening over the 2 weeks following treatment completion.

## 2021-02-03 ENCOUNTER — HOSPITAL ENCOUNTER (OUTPATIENT)
Dept: RADIATION ONCOLOGY | Facility: HOSPITAL | Age: 76
Discharge: HOME | End: 2021-02-03

## 2021-02-03 ENCOUNTER — OFFICE VISIT (OUTPATIENT)
Dept: ONCOLOGY | Facility: CLINIC | Age: 76
End: 2021-02-03

## 2021-02-03 VITALS
HEART RATE: 97 BPM | WEIGHT: 188 LBS | TEMPERATURE: 97.1 F | DIASTOLIC BLOOD PRESSURE: 69 MMHG | RESPIRATION RATE: 20 BRPM | HEIGHT: 65 IN | OXYGEN SATURATION: 99 % | BODY MASS INDEX: 31.32 KG/M2 | SYSTOLIC BLOOD PRESSURE: 174 MMHG

## 2021-02-03 DIAGNOSIS — C44.229 SQUAMOUS CELL CARCINOMA, EAR, LEFT: Primary | ICD-10-CM

## 2021-02-03 PROCEDURE — 77386: CPT | Performed by: RADIOLOGY

## 2021-02-03 PROCEDURE — 99215 OFFICE O/P EST HI 40 MIN: CPT | Performed by: INTERNAL MEDICINE

## 2021-02-03 RX ORDER — LIDOCAINE HYDROCHLORIDE 20 MG/ML
SOLUTION OROPHARYNGEAL
COMMUNITY
Start: 2021-01-19 | End: 2022-10-17

## 2021-02-03 RX ORDER — CEPHALEXIN 500 MG/1
TABLET ORAL EVERY 8 HOURS SCHEDULED
COMMUNITY
End: 2021-03-05

## 2021-02-03 NOTE — PROGRESS NOTES
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  male presents today for follow-up of metastatic squamous cell of the skin.  He is currently on cisplatin weekly with concurrent radiation due to unresectable disease.  He has 2 more treatments of radiation left.  Completing his cisplatin.  No major issues ongoing from a toxicity standpoint.  Hearing seems relatively intact.  Denies any issues with nausea and vomiting.  No issues with headaches.    Past medical history, social history and family history was reviewed and unchanged from prior visit.    Review of Systems:    Review of Systems   Constitutional: Positive for fatigue.   HENT:  Negative.    Eyes: Negative.    Respiratory: Negative.    Cardiovascular: Negative.    Gastrointestinal: Negative.    Endocrine: Negative.    Genitourinary: Negative.     Musculoskeletal: Negative.    Skin: Negative.    Neurological: Negative.    Hematological: Negative.    Psychiatric/Behavioral: Negative.             Medications:        Current Outpatient Medications:   •  aspirin 81 MG EC tablet, Take 81 mg by mouth Daily., Disp: , Rfl:   •  bacitracin-polymyxin b (POLYSPORIN) 500-22515 UNIT/GM ointment, bacitracin zinc 500 unit-polymyxin B 10,000 unit/gram topical ointment  apply a small amount to affected area twice daily, Disp: , Rfl:   •  Bee Pollen 500 MG chewable tablet, Chew., Disp: , Rfl:   •  Cephalexin 500 MG tablet, Every 8 (Eight) Hours., Disp: , Rfl:   •  dexamethasone  "(DECADRON) 4 MG tablet, Take 2 tablets in the morning daily on days 2, 3 & 4.  Take with food., Disp: 30 tablet, Rfl: 5  •  HYDROcodone-acetaminophen (Norco) 7.5-325 MG per tablet, Every 6 (Six) Hours., Disp: , Rfl:   •  Lidocaine Viscous HCl (XYLOCAINE) 2 % solution, , Disp: , Rfl:   •  lisinopril (PRINIVIL,ZESTRIL) 10 MG tablet, Take 10 mg by mouth Daily., Disp: , Rfl:   •  LORATADINE PO, Take 1 tablet by mouth Daily., Disp: , Rfl:   •  Magic mouthwash Radonc, Swish and swallow 10 mL 4 (Four) Times a Day Before Meals & at Bedtime., Disp: 480 mL, Rfl: 3  •  multivitamin with minerals (MULTIVITAMIN ADULT PO), Take 1 tablet by mouth Daily., Disp: , Rfl:   •  ondansetron (ZOFRAN) 8 MG tablet, Take 1 tablet by mouth 3 (Three) Times a Day As Needed for Nausea or Vomiting., Disp: 30 tablet, Rfl: 5  •  ondansetron ODT (ZOFRAN-ODT) 4 MG disintegrating tablet, DISSOLVE 1 TABLET IN MOUTH EVERY 4 HOURS AS NEEDED (MAX 3 DOSES), Disp: , Rfl:   •  simvastatin (ZOCOR) 40 MG tablet, Take 40 mg by mouth Every Night., Disp: , Rfl:     Pain Medications             aspirin 81 MG EC tablet Take 81 mg by mouth Daily.    dexamethasone (DECADRON) 4 MG tablet Take 2 tablets in the morning daily on days 2, 3 & 4.  Take with food.    HYDROcodone-acetaminophen (Norco) 7.5-325 MG per tablet Every 6 (Six) Hours.    Magic mouthwash Radonc Swish and swallow 10 mL 4 (Four) Times a Day Before Meals & at Bedtime.             ALLERGIES:    Allergies   Allergen Reactions   • Penicillins Rash         Physical Exam    VITAL SIGNS:  /69   Pulse 97   Temp 97.1 °F (36.2 °C) (Temporal)   Resp 20   Ht 165.1 cm (65\")   Wt 85.3 kg (188 lb)   SpO2 99%   BMI 31.28 kg/m²     Wt Readings from Last 3 Encounters:   02/03/21 85.3 kg (188 lb)   02/02/21 85.3 kg (188 lb 1.6 oz)   01/27/21 87.2 kg (192 lb 3.2 oz)       Body mass index is 31.28 kg/m². Body surface area is 1.93 meters squared.    Pain Score    02/03/21 0846   PainSc: 0-No pain         " Performance Status: 0    General: well appearing, in no acute distress  HEENT: sclera anicteric, neck is supple  Lymphatics: no cervical, supraclavicular, or axillary adenopathy  Cardiovascular: regular rate and rhythm, no murmurs, rubs or gallops  Lungs: clear to auscultation bilaterally  Abdomen: soft, nontender, nondistended.  No palpable organomegaly  Extremities: no lower extremity edema  Skin: no rashes, lesions, bruising, or petechiae  Msk:  Shows no weakness of the large muscle groups  Psych: Mood is stable        RECENT LABS:    Lab Results   Component Value Date    HGB 9.4 (L) 01/27/2021    HCT 27.9 (L) 01/27/2021    MCV 89.7 01/27/2021     (L) 01/27/2021    WBC 4.10 01/27/2021    NEUTROABS 3.10 01/27/2021    LYMPHSABS 0.60 (L) 01/27/2021    MONOSABS 0.40 01/27/2021       Lab Results   Component Value Date    GLUCOSE 112 (H) 01/27/2021    BUN 32 (H) 01/27/2021    CREATININE 1.40 01/27/2021     (L) 01/27/2021    K 4.3 01/27/2021    CL 99 01/27/2021    CO2 22.0 01/27/2021    CALCIUM 9.1 01/27/2021    PROTEINTOT 6.0 01/13/2021    ALBUMIN 3.60 01/13/2021    BILITOT 0.3 01/13/2021    ALKPHOS 80 01/13/2021    AST 16 01/13/2021    ALT 21 01/13/2021       Nm Pet Skull Base To Mid Thigh    Result Date: 11/12/2020  Abnormal activity identified correlating to bulky adenopathy posterior to the left parotid gland suggesting metastatic disease. Direct visualization is recommended at the base of the left neck to exclude possible skin lesion or fold in the skin creating very low-level activity. There is also low-level activity nodule in the left parotid gland. Continued follow-up recommended as indicated.   D:  11/09/2020 E:  11/09/2020  This report was finalized on 11/12/2020 4:07 PM by Dr. Lilia Carmen MD.            Assessment/Plan    1.  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  He had resection.  Now he is undergoing chemotherapy and radiation to the  unresectable disease.  He will complete all treatment in 2 days.  I am going to wait 4 months to repeat his PET scan.  We will see how he does going forward.      highly complex patient with discussion regarding his treatment and symptoms and management of those symptoms.    Patient has need for management and monitoring of toxicity of his chemotherapy and has a life-threatening illness that requires intervention.      Jesus Manuel Viveros MD  [facility] Hematology and Oncology    Return on: 06/02/21  Return in (Approximately): 4 months    Orders Placed This Encounter   Procedures   • NM Pet Skull Base To Mid Thigh       2/3/2021

## 2021-02-04 ENCOUNTER — HOSPITAL ENCOUNTER (OUTPATIENT)
Dept: RADIATION ONCOLOGY | Facility: HOSPITAL | Age: 76
Discharge: HOME | End: 2021-02-04

## 2021-02-04 PROCEDURE — 77386: CPT | Performed by: RADIOLOGY

## 2021-02-04 PROCEDURE — 77336 RADIATION PHYSICS CONSULT: CPT | Performed by: RADIOLOGY

## 2021-02-05 ENCOUNTER — HOSPITAL ENCOUNTER (OUTPATIENT)
Dept: SPEECH THERAPY | Facility: HOSPITAL | Age: 76
Setting detail: THERAPIES SERIES
Discharge: HOME | End: 2021-02-05

## 2021-02-05 ENCOUNTER — HOSPITAL ENCOUNTER (OUTPATIENT)
Dept: RADIATION ONCOLOGY | Facility: HOSPITAL | Age: 76
Discharge: HOME | End: 2021-02-05

## 2021-02-05 DIAGNOSIS — C44.229 SQUAMOUS CELL CARCINOMA, EAR, LEFT: Primary | ICD-10-CM

## 2021-02-05 PROCEDURE — 92526 ORAL FUNCTION THERAPY: CPT

## 2021-02-05 PROCEDURE — 77386: CPT | Performed by: RADIOLOGY

## 2021-02-05 NOTE — THERAPY TREATMENT NOTE
"Outpatient Speech Language Pathology   Adult Swallow Treatment Note  [facility]     Patient Name: Ajith Gallego  : 1945  MRN: [id number]  Today's Date: 2021         Visit Date: 2021   Patient Active Problem List   Diagnosis   • Neck mass   • Squamous cell carcinoma, ear, left   • Prostate cancer (CMS/HCC)        Visit Dx:    ICD-10-CM ICD-9-CM   1. Squamous cell carcinoma, ear, left  C44.229 173.22                         SLP OP Goals     Row Name 21 0810          Goal Type Needed    Goal Type Needed  Dysphagia;Other Adult Goals  -HG        Subjective Comments    Subjective Comments  Pt alert, cooperative, returned with folder with handouts. Pt feels that his throat is \"narrow\" or \"tight\".  Pt with topical burns due to intensified radiation. Pt   -HG        Dysphagia Goals    Patient will safely consume the recommended diet without complications such as aspiration pneumonia  NTLs/mech soft diet.  -HG     Status: Patient will safely consume the recommended diet without complications such as aspiration pneumonia  Progressing as expected;Revised  -HG     Comments: Patient will safely consume the recommended diet without complications such as aspiration pneumonia  21: Pt states that his solids (soft at this time) are going down with no difficulty followed by NTLs.  21: Pt continues to tolerate NTLs at meals with \"no issues\". Pt eating cream soups, cream of corn, chicken noodle soup with extra chicken, corn bread and buttermilk, yogurt. Meats are too dry unless with cream of gravy and pt stated, \"my mouth is too dry and I just have to chew and chew.\" Pt tolerating puddings and ice cream. Pt is also drinking a supplement drink 3x/day.  21: Pt continues to drink NTLs at meals. Pt is actually tolerated soups with no difficulty (no choking or coughing). 21: Pt reports no problems with NTLs and mech soft diet and states that he feels his swallowing has improved. Pt is " tolerating soft foods, avoiding tough meats. 1/7/21: Pt with recent MBS with results: Mild-moderate pharyngeal dysphagia. Penetration/aspiration before/during the swallow w/ thin liquids 2' pre-spill, delayed initiation, and delayed/reduced vestibular closure. Aspiration was completely silent- please see MBS full report.  Diet and liquids changed to [facility] soft no mixed consistencies and NTLs.   -HG     Comments: Patient will improve oral skills to enhance safety and increase eating efficiency by increasing accuracy of back of tongue control  N/A at this time.  -HG     Patient will improve stage transition duration of swallow/improve timing to reduce food falling into the airway by decreasing swallow delay after neurosensory  stimulation  super-supraglottic swallow;mendelsohn maneuver;with cues  -HG     Status: Patient will improve stage transition duration of swallow/improve timing to reduce food falling into the airway by decreasing swallow delay after neurosensory  stimulation  Progressing as expected  -HG     Comments: Patient will improve stage transition duration of swallow/improve timing to reduce food falling into the airway by decreasing swallow delay after neurosensory  stimulation  2/5/21: Pt completed with accuracy during session.  1/29/21: Pt completed with accuracy during session.  1/22/21: Pt completed each with 100% accurate x 5.  1/14/21: Pt completed both with accuracy x 3.   1/7/21: Pt completed each x 2-3x.   -HG     Patient will increase laryngeal elevation to reduce residue that might fall into airway by completing  Mendelsohn maneuver;super-supraglottic swallow;falsetto/pitch glide;with cues  -HG     Status: Patient will increase laryngeal elevation to reduce residue that might fall into airway by completing  Progressing as expected  -HG     Comments: Patient will increase laryngeal elevation to reduce residue that might fall into airway by completing  2/5/21: Pt completed with accuracy during  session. 1/29/21: Pt completed with accuracy during session.  1/22/21: Pt completed x 5.  1/14/21: Pt completed with accuracy for super-supraglottic swallow and falsetto- Mendelsohn remains a challenge but pt and SLP feel increased elevation. 1/7/21: Pt feels that he has a small shah apple.  Mendelsohn x 3. Falsetto/pitch completed x 3.   -HG     Patient will increase closure of larynx to keep food from falling into the airway by completing  super-supraglottic swallow;with cues  -HG     Status: Patient will increase closure of larynx to keep food from falling into the airway by completing  Progressing as expected  -HG     Comments: Patient will increase closure of larynx to keep food from falling into the airway by completing  2/5/21: Pt completed with accuracy during the session. 1/29/21: Pt completed with accuracy during session.  1/22/21: Pt completed x 5. 1/14/21: Completed with accuracy. 1/7/12: With use of water in session, pt completed super-supraglottic swallow x 2.   -HG     Patient will increase strength of tongue base and posterior pharyngeal walls to reduce residue that might fall into airway by completing  effotful swallow;Carla (tongue hold);with cues  -HG     Status: Patient will increase strength of tongue base and posterior pharyngeal walls to reduce residue that might fall into airway by completing  Progressing as expected  -HG     Comments: Patient will increase strength of tongue base and posterior pharyngeal walls to reduce residue that might fall into airway by completing  2/5/21: Pt completed with accuracy during session. 1/29/21: Pt completed with accuracy during session.  1/22/21: Pt completed x 3. 1/14/21: Carla remains difficult but continues to try to complete daily.  1/7/21: No difficulty with effortful swallow. Carla is challenging and pt made 3 attempts during session.   -HG     Patient will improve hyolaryngeal elevation via completing Luis E (head lift)  sustained lift (comment  #/duration of lifts);repetitive lift (comment #/duration of lifts);with cues 30 sustained and 15 reps.  -HG     Status: Patient will improve hyolaryngeal elevation via completing Luis E (head lift)  Progressing as expected  -HG     Comments: Patient will improve hyolaryngeal elevation via completing Luis E (head lift)  2/5/21: Pt reported completing this morning before he got out of bed and it was tighter on his neck. 1/29/21:Pt reports completing those at home and using a timer to hold for full 30 secs and completes the repetitive lifts with no difficulties.  1/22/21:  Pt reports completing at home. 1/14/21: Pt completing at home on bench at end of bed. 1/7/21: Pt reports timing himself and is able to hold for 35 seconds and completes reps with no neck pain.   -HG        Other Goals    Other Adult Goal- 1  Pt will complete stretching of strap muscles in order to improve ROM.  -HG     Status: Other Adult Goal- 1  Progressing as expected  -HG     Comments: Other Adult Goal- 1  2/5/21: Pt completing and SLP showed pt on his right side what he can do once his left side has healed.  1/29/21: Pt completes at home daily.  1/22/21: Pt is completing stretching exercise at home. 1/14/21: PT reports completing daily. 1/7/21: Pt reports completing stretching exercises.   -HG     Other Adult Goal- 2  Pt will complete MBS with RECs to follow as indicated.   -HG     Status: Other Adult Goal- 2  Progressing as expected  -HG     Comments: Other Adult Goal- 2  1/22/21: Discussed repeat MBS at conclusion of treatment post 3-4 weeks. 1/7/21: MBS results from 1/5/21: Mild-moderate pharyngeal dysphagia. Penetration/aspiration before/during the swallow w/ thin liquids 2' pre-spill, delayed initiation, and delayed/reduced vestibular closure. Aspiration was completely silent.  -HG     Other Adult Goal- 3  Pt will tolerate water between meals only after oral care.  -HG     Status: Other Adult Goal- 3  Progressing as expected  -HG      Comments: Other Adult Goal- 3  2/5/21: Pt did exhibit intermittent coughing and throat clearing this date with water trials. 1/29/21: Pt tolerated water during session with only one cough out of 5 drinks during session while doing exercises.   1/22/21: Pt tolerated several cup drinks of water with no s/s of aspiration. Pt used water with all of his exercises this date. 1/14/21: Pt tolerating water between meals and with exercises.   -HG        SLP Time Calculation    SLP Goal Re-Cert Due Date  02/13/21  -HG       User Key  (r) = Recorded By, (t) = Taken By, (c) = Cosigned By    Initials Name Provider Type    Iris Duran MS CCC-SLP Speech and Language Pathologist          OP SLP Education     Row Name 02/05/21 0810       Education    Education Comments  Education with pt regarding cont'd plan for tx and plan to repeat MBS within the next 4 weeks.   -HG      User Key  (r) = Recorded By, (t) = Taken By, (c) = Cosigned By    Initials Name Effective Dates    Iris Duran MS CCC-SLP 08/09/20 -           OP SLP Assessment/Plan - 02/05/21 0810        SLP Plan    Plan Comments  Cont with Dysphagia and repeat MBS in the next 4 weeks post radiation tx.   -HG      User Key  (r) = Recorded By, (t) = Taken By, (c) = Cosigned By    Initials Name Provider Type    Iris Duran MS CCC-SLP Speech and Language Pathologist                Time Calculation:   SLP Start Time: 0810    Therapy Charges for Today     Code Description Service Date Service Provider Modifiers Qty    [id number]  ST TREATMENT SWALLOW 2 2/5/2021 Iris Zhang MS CCC-SLP GN 1                   MS JAMILA Fabian  2/5/2021

## 2021-02-05 NOTE — RADIATION COMPLETION NOTES
DATE OF COMPLETION: 2/5/2021  DIAGNOSIS: Squamous cell carcinoma, ear, left  - Stage IV (cT1, cN2b(U), cM0)      REFERRING: SOLIS HessAjith completed radiation therapy today.      BACKGROUND: Ajith Gallego is a very pleasant 75 y.o. male  with a past medical history significant for hypertension, coronary artery disease, as well as a history of multiple cutaneous skin cancers.  He was found in July of this year to have a aggressive appearing squamous cell carcinoma involving the left helix of his ear.  He underwent a resection by his dermatologist and was then placed under follow-up.  Over the next 2 to 3 months, he noticed a gradually enlarging left cheek and neck mass that unfortunately was found to be consistent with a metastasis of a squamous cell carcinoma consistent with a primary from his ear.  He was evaluated by Dr. Patten with ENT.  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  Dr. Patten personally contacted me to discuss several of the high risk features including multifocal disease, tumor is densely adherent to the parotid gland and concern that there could be microscopic disease left along the facial nerve towards the base of skull.  The patient completed radiation as below.  Treatment Summary     Dates of Therapy: 12/21/2020-/5/21  Treatment Site: Left neck  Dose: 66 Gy in 33 fractions of 2 Gy each  Technique: VMAT 6 x    Treatment Course and Tolerance: The patient tolerated treatment with expected toxicities in a timely fashion.    The initial follow up visit will be in 1 month.    Ajith Gallego knows to call if any problems or concerns develop in the meantime.     Electronically signed by: Baldemra  Terell Jenkins MD                    Cc: Marlon Romero MD

## 2021-02-18 ENCOUNTER — DOCUMENTATION (OUTPATIENT)
Dept: NUTRITION | Facility: HOSPITAL | Age: 76
End: 2021-02-18

## 2021-02-18 NOTE — PROGRESS NOTES
ONC Nutrition    Follow up phone call with patient to ascertain progress following completion of chemoradiation for H&N cancer.     Patient is doing well; oral food intake has improved and he is able to consume soft cooked foods; states that since completion of treatment, he has regained 3 lbs.  He continues to supplement with ONS to meet his increased needs and promote weight gain.    His mouth has healed and he is now able to brush his teeth without pain or discomfort.  He continues to use the baking soda, salt and water mouth rinses numerous times throughout the day.  He continues to follow with Iris Zhang SLP.    He continues to struggle with taste changes, though they have improved slightly.  Provided tips for management.    Patient has my contact information for any additional questions or concerns.  He has a FU appointment in RAD ON[facility] 3/5/21.

## 2021-02-19 ENCOUNTER — HOSPITAL ENCOUNTER (OUTPATIENT)
Dept: SPEECH THERAPY | Facility: HOSPITAL | Age: 76
Setting detail: THERAPIES SERIES
Discharge: HOME | End: 2021-02-19

## 2021-02-19 DIAGNOSIS — C44.229 SQUAMOUS CELL CARCINOMA, EAR, LEFT: Primary | ICD-10-CM

## 2021-02-19 DIAGNOSIS — R13.13 PHARYNGEAL DYSPHAGIA: ICD-10-CM

## 2021-02-19 PROCEDURE — 92526 ORAL FUNCTION THERAPY: CPT

## 2021-02-19 NOTE — THERAPY PROGRESS REPORT/RE-CERT
"Outpatient Speech Language Pathology   Adult Swallow Progress Note  [facility]     Patient Name: Ajith Gallego  : 1945  MRN: [id number]  Today's Date: 2021         Visit Date: 2021   Patient Active Problem List   Diagnosis   • Neck mass   • Squamous cell carcinoma, ear, left   • Prostate cancer (CMS/HCC)        Visit Dx:    ICD-10-CM ICD-9-CM   1. Squamous cell carcinoma, ear, left  C44.229 173.22                         SLP OP Goals     Row Name 21 0900          Goal Type Needed    Goal Type Needed  Dysphagia;Other Adult Goals  -HG        Subjective Comments    Subjective Comments  Pt alert, cooperative, returned with exercises. Pt does report taste slowly improving.   -HG        Dysphagia Goals    Patient will safely consume the recommended diet without complications such as aspiration pneumonia  NTLs/[facility] soft diet.  -HG     Status: Patient will safely consume the recommended diet without complications such as aspiration pneumonia  Progressing as expected;Revised  -HG     Comments: Patient will safely consume the recommended diet without complications such as aspiration pneumonia  21: Pt tolerating \"harder\" foods such as tougher bread for grilled cheese and pt tolerated with no difficulty. Pt ate two chili dog minus the buns.  Pt continues to eat a soft diet.  Pt continues to drink NTLs with meals and reports no coughing. 21: Pt states that his solids (soft at this time) are going down with no difficulty followed by NTLs.  21: Pt continues to tolerate NTLs at meals with \"no issues\". Pt eating cream soups, cream of corn, chicken noodle soup with extra chicken, corn bread and buttermilk, yogurt. Meats are too dry unless with cream of gravy and pt stated, \"my mouth is too dry and I just have to chew and chew.\" Pt tolerating puddings and ice cream. Pt is also drinking a supplement drink 3x/day.  21: Pt continues to drink NTLs at meals. Pt is actually tolerated soups " with no difficulty (no choking or coughing). 1/14/21: Pt reports no problems with NTLs and mech soft diet and states that he feels his swallowing has improved. Pt is tolerating soft foods, avoiding tough meats. 1/7/21: Pt with recent MBS with results: Mild-moderate pharyngeal dysphagia. Penetration/aspiration before/during the swallow w/ thin liquids 2' pre-spill, delayed initiation, and delayed/reduced vestibular closure. Aspiration was completely silent- please see MBS full report.  Diet and liquids changed to mech soft no mixed consistencies and NTLs.   -HG     Comments: Patient will improve oral skills to enhance safety and increase eating efficiency by increasing accuracy of back of tongue control  N/A at this time.  -HG     Patient will improve stage transition duration of swallow/improve timing to reduce food falling into the airway by decreasing swallow delay after neurosensory  stimulation  super-supraglottic swallow;mendelsohn maneuver;with cues  -HG     Status: Patient will improve stage transition duration of swallow/improve timing to reduce food falling into the airway by decreasing swallow delay after neurosensory  stimulation  Progressing as expected  -HG     Comments: Patient will improve stage transition duration of swallow/improve timing to reduce food falling into the airway by decreasing swallow delay after neurosensory  stimulation  2/19/21: Pt completed with accuracy during session x 2. 2/5/21: Pt completed with accuracy during session.  1/29/21: Pt completed with accuracy during session.  1/22/21: Pt completed each with 100% accurate x 5.  1/14/21: Pt completed both with accuracy x 3.   1/7/21: Pt completed each x 2-3x.   -HG     Patient will increase laryngeal elevation to reduce residue that might fall into airway by completing  Mendelsohn maneuver;super-supraglottic swallow;falsetto/pitch glide;with cues  -HG     Status: Patient will increase laryngeal elevation to reduce residue that might  fall into airway by completing  Progressing as expected  -HG     Comments: Patient will increase laryngeal elevation to reduce residue that might fall into airway by completing  2/19/21: Pt completed with accuracy during session x 2.  2/5/21: Pt completed with accuracy during session. 1/29/21: Pt completed with accuracy during session.  1/22/21: Pt completed x 5.  1/14/21: Pt completed with accuracy for super-supraglottic swallow and falsetto- Mendelsohn remains a challenge but pt and SLP feel increased elevation. 1/7/21: Pt feels that he has a small shah apple.  Mendelsohn x 3. Falsetto/pitch completed x 3.   -HG     Patient will increase closure of larynx to keep food from falling into the airway by completing  super-supraglottic swallow;with cues  -HG     Status: Patient will increase closure of larynx to keep food from falling into the airway by completing  Progressing as expected  -HG     Comments: Patient will increase closure of larynx to keep food from falling into the airway by completing  2/19/21: Pt completed with accuracy during session x 2. Pt does not that she repeat swallow is hard to complete at times.  2/5/21: Pt completed with accuracy during the session. 1/29/21: Pt completed with accuracy during session.  1/22/21: Pt completed x 5. 1/14/21: Completed with accuracy. 1/7/12: With use of water in session, pt completed super-supraglottic swallow x 2.   -HG     Patient will increase strength of tongue base and posterior pharyngeal walls to reduce residue that might fall into airway by completing  effotful swallow;Carla (tongue hold);with cues  -HG     Status: Patient will increase strength of tongue base and posterior pharyngeal walls to reduce residue that might fall into airway by completing  Progressing as expected  -HG     Comments: Patient will increase strength of tongue base and posterior pharyngeal walls to reduce residue that might fall into airway by completing  2/19/21: Pt completed with  accuracy during session x 2.  2/5/21: Pt completed with accuracy during session. 1/29/21: Pt completed with accuracy during session.  1/22/21: Pt completed x 3. 1/14/21: Carla remains difficult but continues to try to complete daily.  1/7/21: No difficulty with effortful swallow. Carla is challenging and pt made 3 attempts during session.   -HG     Patient will improve hyolaryngeal elevation via completing Luis E (head lift)  sustained lift (comment #/duration of lifts);repetitive lift (comment #/duration of lifts);with cues 30 sustained and 15 reps.  -HG     Status: Patient will improve hyolaryngeal elevation via completing Luis E (head lift)  Progressing as expected  -HG     Comments: Patient will improve hyolaryngeal elevation via completing Luis E (head lift)  2/19/21: Pt continues to complete those at home and feels a good stretch in the back of his neck.  2/5/21: Pt reported completing this morning before he got out of bed and it was tighter on his neck. 1/29/21:Pt reports completing those at home and using a timer to hold for full 30 secs and completes the repetitive lifts with no difficulties.  1/22/21:  Pt reports completing at home. 1/14/21: Pt completing at home on bench at end of bed. 1/7/21: Pt reports timing himself and is able to hold for 35 seconds and completes reps with no neck pain.   -HG        Other Goals    Other Adult Goal- 1  Pt will complete stretching of strap muscles in order to improve ROM.  -HG     Status: Other Adult Goal- 1  Progressing as expected  -HG     Comments: Other Adult Goal- 1  2/19/21: Pt completed x 3 during session.  2/5/21: Pt completing and SLP showed pt on his right side what he can do once his left side has healed.  1/29/21: Pt completes at home daily.  1/22/21: Pt is completing stretching exercise at home. 1/14/21: PT reports completing daily. 1/7/21: Pt reports completing stretching exercises.   -HG     Other Adult Goal- 2  Pt will complete MBS with RECs to follow  as indicated.   -HG     Status: Other Adult Goal- 2  Progressing as expected  -HG     Comments: Other Adult Goal- 2  2/19/21: Ordered repeat MBS for post tx swallow fxn. 1/22/21: Discussed repeat MBS at conclusion of treatment post 3-4 weeks. 1/7/21: MBS results from 1/5/21: Mild-moderate pharyngeal dysphagia. Penetration/aspiration before/during the swallow w/ thin liquids 2' pre-spill, delayed initiation, and delayed/reduced vestibular closure. Aspiration was completely silent.  -HG     Other Adult Goal- 3  Pt will tolerate water between meals only after oral care.  -HG     Status: Other Adult Goal- 3  Progressing as expected  -HG     Comments: Other Adult Goal- 3  2/19/21: Water trials during session and pt exhibited a spont throat clear and no other s/s t/o session. Pt has learned at home that he must take his time, not talk and small sips when drinking water.  2/5/21: Pt did exhibit intermittent coughing and throat clearing this date with water trials. 1/29/21: Pt tolerated water during session with only one cough out of 5 drinks during session while doing exercises.   1/22/21: Pt tolerated several cup drinks of water with no s/s of aspiration. Pt used water with all of his exercises this date. 1/14/21: Pt tolerating water between meals and with exercises.   -HG        SLP Time Calculation    SLP Goal Re-Cert Due Date  03/21/21  -HG       User Key  (r) = Recorded By, (t) = Taken By, (c) = Cosigned By    Initials Name Provider Type    Iris Duran MS CCC-SLP Speech and Language Pathologist          OP SLP Education     Row Name 02/19/21 0900       Education    Education Comments  Education for plan of care and plan for repeat MBS and it to be an MBS with limited upper GI due to hx of reflux and esophageal retention on previous MBS.   -      User Key  (r) = Recorded By, (t) = Taken By, (c) = Cosigned By    Initials Name Effective Dates    Iris Duran MS CCC-SLP 08/09/20 -           OP SLP  Assessment/Plan - 02/19/21 0900        SLP Assessment    Functional Problems  Swallowing   -HG    Impact on Function: Swallowing  Risk of aspiration;Risk of pneumonia   -HG    Clinical Impression: Swallowing  Mild-Moderate:;pharyngeal phase dysphagia   -HG    Functional Problems Comment  Pt reports continuing to tolerate NTLs and water between meals. Pt is tolerated more regular solids via trials.   -HG    Clinical Impression Comments  Water trials during session with intermittent s/s of aspiration. MBS ordered this date.   -HG    Please refer to paper survey for additional self-reported information  No   -HG    Please refer to items scanned into chart for additional diagnostic informaiton and handouts as provided by clinician  No   -HG    SLP Diagnosis  Mild to Moderate pharyngeal dysphagia    -HG    Prognosis  Excellent (comment)   -HG    Patient/caregiver participated in establishment of treatment plan and goals  Yes   -HG    Patient would benefit from skilled therapy intervention  Yes   -HG       SLP Plan    Frequency  1x/week   -HG    Duration  4-6 weeks   -HG    Planned CPT's?  SLP SWALLOW THERAPY: [id number]   -HG    Expected Duration of Therapy Session (SLP Eval)  45   -HG    Plan Comments  Cont with Dysphagia tx and repeat MBS.    -HG      User Key  (r) = Recorded By, (t) = Taken By, (c) = Cosigned By    Initials Name Provider Type    HG Iris Zhang MS CCC-SLP Speech and Language Pathologist                Time Calculation:   SLP Start Time: 0900    Therapy Charges for Today     Code Description Service Date Service Provider Modifiers Qty    [id number]  ST TREATMENT SWALLOW 3 2/19/2021 Iris Zhang MS CCC-SLP GN 1                   Iris Zhang MS CCC-SLP  2/19/2021

## 2021-03-05 ENCOUNTER — HOSPITAL ENCOUNTER (OUTPATIENT)
Dept: RADIATION ONCOLOGY | Facility: HOSPITAL | Age: 76
Setting detail: RADIATION/ONCOLOGY SERIES
Discharge: HOME | End: 2021-03-05

## 2021-03-05 ENCOUNTER — OFFICE VISIT (OUTPATIENT)
Dept: RADIATION ONCOLOGY | Facility: HOSPITAL | Age: 76
End: 2021-03-05

## 2021-03-05 VITALS
HEART RATE: 93 BPM | HEIGHT: 67 IN | TEMPERATURE: 97.8 F | OXYGEN SATURATION: 97 % | WEIGHT: 189.9 LBS | BODY MASS INDEX: 29.81 KG/M2 | RESPIRATION RATE: 17 BRPM | DIASTOLIC BLOOD PRESSURE: 72 MMHG | SYSTOLIC BLOOD PRESSURE: 155 MMHG

## 2021-03-05 DIAGNOSIS — C44.229 SQUAMOUS CELL CARCINOMA, EAR, LEFT: ICD-10-CM

## 2021-03-05 PROCEDURE — G0463 HOSPITAL OUTPT CLINIC VISIT: HCPCS

## 2021-03-05 NOTE — PROGRESS NOTES
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 Gallego is a 75 y.o. gentleman with multiple medical comorbidities, including a history of multiple cutaneous skin cancers.  Last summer, he underwent surgical resection of a squamous cell carcinoma involving the left helix of the ear.  More recently, he was found to have metastases to the left parotid and left neck, with no other evidence of distant disease on staging PET/CT scan.  He underwent left superficial peridectomy and left neck dissection, with pathology demonstrating metastatic squamous cell carcinoma as well as multiple high risk features including multifocal disease, tumor densely adherent to the parotid gland, and concern for microscopic disease left along the facial nerve towards the base of skull.  He therefore underwent a course of concurrent cisplatin with radiation.  The neck left with additional coverage to cover the base of skull was treated to a dose of 66 Gy in 33 fractions.  He tolerated treatment well and is pleased with his recovery, particularly over the past 2 weeks.  He reports improvement in posttreatment fatigue, taste, xerostomia, appetite, and swallowing.  He is able to chew and swallow without difficulty and is tolerating a wide range of solids and liquids.  He has gained a few pounds since we saw him last.  He continues with baseline numbness and stiffness within the left neck.  He otherwise denies any pain, shortness of air, new or aggressive skin lesions, or other acute concerns today.        MEDICATIONS: Medication reconciliation for the patient was reviewed and confirmed in the  electronic medical record.    Review of Systems   Constitutional: Positive for fatigue.   HENT:   Positive for tinnitus (baseline).    Neurological: Positive for numbness (s/p surgery).   All other systems reviewed and are negative.      KPS 80%    Physical Exam  Vitals signs and nursing note reviewed.   Constitutional:       General: He is not in acute distress.     Appearance: Normal appearance. He is well-developed.   HENT:      Head: Normocephalic and atraumatic.      Mouth/Throat:      Mouth: Mucous membranes are moist. No oral lesions.      Tongue: No lesions.      Palate: No lesions.      Comments: Erythematous posterior pharynx without evidence of oral lesion.  Eyes:      Conjunctiva/sclera: Conjunctivae normal.      Pupils: Pupils are equal, round, and reactive to light.   Neck:      Musculoskeletal: Normal range of motion and neck supple.      Comments: Slight volume loss and mild fibrosis of the left neck consistent with postsurgical changes.  There are no aggressive residual skin lesions or abnormalities.  Well-healed surgical incision CDI, without focal nodularity.  No residual palpable adenopathy in the bilateral neck.  Cardiovascular:      Rate and Rhythm: Normal rate and regular rhythm.      Heart sounds: No murmur. No friction rub.   Pulmonary:      Effort: Pulmonary effort is normal.      Breath sounds: Normal breath sounds. No wheezing.   Musculoskeletal: Normal range of motion.      Comments: Full flexion/extension/rotation of the neck   Lymphadenopathy:      Head:      Right side of head: No posterior auricular adenopathy.      Left side of head: No posterior auricular adenopathy.      Cervical: No cervical adenopathy.      Upper Body:      Right upper body: No supraclavicular adenopathy.      Left upper body: No supraclavicular adenopathy.   Skin:     General: Skin is warm and dry.   Neurological:      Mental Status: He is alert and oriented to person, place, and time.   Psychiatric:          "Behavior: Behavior normal.         Thought Content: Thought content normal.         Judgment: Judgment normal.         VITAL SIGNS:   Vitals:    03/05/21 1122   BP: 155/72   Pulse: 93   Resp: 17   Temp: 97.8 °F (36.6 °C)   SpO2: 97%   Weight: 86.1 kg (189 lb 14.4 oz)   Height: 170.2 cm (67\")   PainSc: 0-No pain                   The following portions of the patient's history were reviewed and updated as appropriate: allergies, current medications, past family history, past medical history, past social history, past surgical history and problem list.         Diagnoses and all orders for this visit:    1. Squamous cell carcinoma, ear, left         IMPRESSION:   Mr. Gallego is a 75 y.o. 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  He underwent left modified radical neck dissection and left parotidectomy, and given multiple high risk features, he was treated with concurrent chemoradiation to the unresectable disease.  He tolerated treatment fairly well.  Acute residual radiation-related toxicities continue to sherri.  From a clinical standpoint, I am pleased with his recovery.  He is expanding his diet without issue, and is to be seen next week for repeat swallow study.  He continues with good range of motion of the left and continues exercises/massage to minimize fibrosis.  Medical oncology has already ordered restaging PET/CT in approximately 3 months to determine treatment response.  He has a follow-up with Dr. Patten in the interim.  We reviewed follow-up intervals with clinical exams, repeat imaging, and expectations for response to treatment.      RECOMMENDATIONS:  Mr. Gallego continues routine oncologic surveillance under the care of Dr. Snow, with restaging PET/CT scan and follow-up scheduled 6/2/2021.  I will schedule him to return in approximately 3 months.    Return in about 3 months (around 6/2/2021) for Office " Visit.    Allen Arroyo APRN

## 2021-03-09 ENCOUNTER — APPOINTMENT (OUTPATIENT)
Dept: PREADMISSION TESTING | Facility: HOSPITAL | Age: 76
End: 2021-03-09

## 2021-03-09 PROCEDURE — U0005 INFEC AGEN DETEC AMPLI PROBE: HCPCS

## 2021-03-09 PROCEDURE — U0004 COV-19 TEST NON-CDC HGH THRU: HCPCS

## 2021-03-09 PROCEDURE — C9803 HOPD COVID-19 SPEC COLLECT: HCPCS

## 2021-03-10 LAB — SARS-COV-2 RNA RESP QL NAA+PROBE: NOT DETECTED

## 2021-03-12 ENCOUNTER — HOSPITAL ENCOUNTER (OUTPATIENT)
Dept: GENERAL RADIOLOGY | Facility: HOSPITAL | Age: 76
Discharge: HOME | End: 2021-03-12

## 2021-03-12 DIAGNOSIS — R13.13 PHARYNGEAL DYSPHAGIA: ICD-10-CM

## 2021-03-12 PROCEDURE — A9270 NON-COVERED ITEM OR SERVICE: HCPCS | Performed by: RADIOLOGY

## 2021-03-12 PROCEDURE — 74230 X-RAY XM SWLNG FUNCJ C+: CPT

## 2021-03-12 PROCEDURE — 63710000001 BARIUM SULFATE 40 % RECONSTITUTED SUSPENSION: Performed by: RADIOLOGY

## 2021-03-12 PROCEDURE — 92611 MOTION FLUOROSCOPY/SWALLOW: CPT

## 2021-03-12 PROCEDURE — 63710000001 BARIUM SULFATE 40 % PASTE: Performed by: RADIOLOGY

## 2021-03-12 PROCEDURE — 74240 X-RAY XM UPR GI TRC 1CNTRST: CPT

## 2021-03-12 RX ADMIN — BARIUM SULFATE 100 ML: 0.81 POWDER, FOR SUSPENSION ORAL at 09:38

## 2021-03-12 RX ADMIN — BARIUM SULFATE 20 ML: 400 PASTE ORAL at 09:38

## 2021-03-12 NOTE — MBS/VFSS/FEES
Outpatient Speech Language Pathology   Adult Swallow Initial Evaluation   Modified Barium Swallow Study (MBS)   [facility]     Patient Name: Ajith Gallego  : 1945  MRN: [id number]  Today's Date: 3/12/2021         Visit Date: 2021   Patient Active Problem List   Diagnosis   • Neck mass   • Squamous cell carcinoma, ear, left   • Prostate cancer (CMS/HCC)        Past Medical History:   Diagnosis Date   • Arthritis    • Cancer of neck (CMS/HCC)    • Elevated cholesterol    • History of radiation therapy 2021    left neck   • Hypertension    • Pacemaker    • Primary parotid gland squamous cell carcinoma (CMS/HCC) 2020   • Prostate cancer (CMS/HCC)    • Skin cancer    • Tattoo    • Wears glasses         Past Surgical History:   Procedure Laterality Date   • CATARACT EXTRACTION, BILATERAL     • NECK DISSECTION Left 2020    Procedure: MODIFIED RADICAL NECK DISSECTION LEFT;  Surgeon: Adams Patten MD;  Location: [facility] OR;  Service: ENT;  Laterality: Left;   • OTHER SURGICAL HISTORY      left ear skin cancer removed   • PACEMAKER IMPLANTATION     • PAROTIDECTOMY Left 2020    Procedure: PAROTIDECTOMY WITH FACIAL NERVE DISSECTION LEFT;  Surgeon: Adams Patten MD;  Location: [facility] OR;  Service: ENT;  Laterality: Left;   • PROSTATECTOMY           Visit Dx:     ICD-10-CM ICD-9-CM   1. Pharyngeal dysphagia  R13.13 787.23           SLP Adult Swallow Evaluation     Row Name 21 1000       General Information    Pertinent History Of Current Problem  Ear CA with mets to neck and parotid gland. S/p L helix ear resections and later L superficial peridectomy and L neck diessection. Subsequent radiation and chemo therapy. MBS 20 revealed silent aspiration of thin liquids. Patient has since been tolerating mech-soft diet and nectar-thick liquids. Reports over past two weeks has been able to increase solids to essentially regular textures and has not noticed difficulties with  thin H2O. MBS to determine if ready for further advancement of diet.   -    Current Method of Nutrition  soft textures;nectar/syrup-thick liquids;water between meals  -    Prior Level of Function-Swallowing  safe, efficient swallowing in all situations  -    Plans/Goals Discussed with  patient;agreed upon  -    Barriers to Rehab  none identified  -    Patient's Goals for Discharge  return to regular diet;eat/drink without coughing/choking  -       Pain    Additional Documentation  Pain Scale: Numbers Pre/Post-Treatment (Group)  -       Pain Scale: Numbers Pre/Post-Treatment    Pretreatment Pain Rating  0/10 - no pain  -SM    Posttreatment Pain Rating  0/10 - no pain  -       MBS/VFSS    Utensils Used  spoon;cup;straw  -    Consistencies Trialed  thin liquids;pureed;regular textures  -       MBS/VFSS Interpretation    Oral Phase  WFL  -SM    Pharyngeal Phase  WFL  -SM    Pharyngeal Phase, Comment  No aspiration with any consistency. High-mid depth penetration of thin liquids into the laryngeal vestibule. Good airway protection throughout. Intermittent, though not significant, valleculae residue with pudding/solids.   -       Clinical Impression    SLP Swallowing Diagnosis  Functional oropharyngeal swallow  -    Plan for Continued Treatment (SLP)  To continue dysphagia exercises as home exercise program for maintenance purpose and to help limit late effects radiation. Updated exercises and provided written copy (to complete 1x/day, 3 days/week of neck/jaw stretching, super-supraglottic swallow, effortful pitch glide, tongue press + effortful swallow, and Shaker vs Chin Tuck Against Resistance (pt to trial both and determine which is more effective for him)). If any increased difficulties of swallowing in future, to notify MD and arrange for repeat MBS as needed.   -       Recommendations    Therapy Frequency (Swallow)  evaluation only  -    SLP Diet Recommendation  regular textures;thin  liquids  -SM    Recommended Precautions and Strategies  upright posture during/after eating  -SM    Oral Care Recommendations  Oral Care BID/PRN  -SM    SLP Rec. for Method of Medication Administration  as tolerated  -SM      User Key  (r) = Recorded By, (t) = Taken By, (c) = Cosigned By    Initials Name Provider Type    Renetta Cornell MS CCC-SLP Speech and Language Pathologist                        OP SLP Education     Row Name 03/12/21 1143       Education    Assessed  Learning needs;Learning motivation;Learning preferences;Learning readiness  -    Learning Motivation  Strong  -    Learning Method  Explanation  -    Teaching Response  Verbalized understanding  -SM      User Key  (r) = Recorded By, (t) = Taken By, (c) = Cosigned By    Initials Name Effective Dates    Renetta Cornell MS CCC-SLP 08/09/20 -                    Time Calculation:   SLP Start Time: 1000    Therapy Charges for Today     Code Description Service Date Service Provider Modifiers Qty    [id number] HC ST MOTION FLUORO EVAL SWALLOW 4 3/12/2021 Renetta Aceves MS CCC-SLP GN 1            Patient was not wearing a face mask and did not exhibit coughing during this therapy encounter.  Procedure performed was aerosolizing, involved close contact (within 6 feet for at least 15 minutes or longer), and did not involve contact with infectious secretions or specimens.  Therapist used appropriate personal protective equipment including gloves, standard procedure mask and eye protection.  Appropriate PPE was worn during the entire therapy session.  Hand hygiene was completed before and after therapy session.         Renetta Aceves MS CCC-SLP  3/12/2021

## 2021-03-22 ENCOUNTER — DOCUMENTATION (OUTPATIENT)
Dept: SPEECH THERAPY | Facility: HOSPITAL | Age: 76
End: 2021-03-22

## 2021-03-22 DIAGNOSIS — R13.13 PHARYNGEAL DYSPHAGIA: Primary | ICD-10-CM

## 2021-03-22 DIAGNOSIS — C44.229 SQUAMOUS CELL CARCINOMA, EAR, LEFT: ICD-10-CM

## 2021-03-22 NOTE — THERAPY DISCHARGE NOTE
"Speech Language Pathology Discharge Summary         Patient Name: Ajith Gallego  : 1945  MRN: [id number]    Today's Date: 3/22/2021      SLP OP Goals     Row Name 21 1000          Goal Type Needed    Goal Type Needed  Dysphagia;Other Adult Goals  -HG        Subjective Comments    Subjective Comments  Pt seen for 6 treatment sessions for dysphagia tx with post radiation MBS completed on 3/12/21 revealing a functional swallow- please see formal report.   -HG        Dysphagia Goals    Patient will safely consume the recommended diet without complications such as aspiration pneumonia  [facility].  -HG     Status: Patient will safely consume the recommended diet without complications such as aspiration pneumonia  Progressing as expected;Revised  -HG     Comments: Patient will safely consume the recommended diet without complications such as aspiration pneumonia  21: Pt tolerating \"harder\" foods such as tougher bread for grilled cheese and pt tolerated with no difficulty. Pt ate two chili dog minus the buns.  Pt continues to eat a soft diet.  Pt continues to drink NTLs with meals and reports no coughing. 21: Pt states that his solids (soft at this time) are going down with no difficulty followed by NTLs.  21: Pt continues to tolerate NTLs at meals with \"no issues\". Pt eating cream soups, cream of corn, chicken noodle soup with extra chicken, corn bread and buttermilk, yogurt. Meats are too dry unless with cream of gravy and pt stated, \"my mouth is too dry and I just have to chew and chew.\" Pt tolerating puddings and ice cream. Pt is also drinking a supplement drink 3x/day.  21: Pt continues to drink NTLs at meals. Pt is actually tolerated soups with no difficulty (no choking or coughing). 21: Pt reports no problems with NTLs and [facility] soft diet and states that he feels his swallowing has improved. Pt is tolerating soft foods, avoiding tough meats. 21: Pt with recent " MBS with results: Mild-moderate pharyngeal dysphagia. Penetration/aspiration before/during the swallow w/ thin liquids 2' pre-spill, delayed initiation, and delayed/reduced vestibular closure. Aspiration was completely silent- please see MBS full report.  D[facility] and [facility] no mixed consistencies and NTLs.   -HG     Comments: Patient will improve oral skills to enhance safety and increase eating efficiency by increasing accuracy of back of tongue control  N/A at this time.  -HG     Patient will improve stage transition duration of swallow/improve timing to reduce food falling into the airway by decreasing swallow delay after neurosensory  stimulation  super-supraglottic swallow;mendelsohn maneuver;with cues  -HG     Status: Patient will improve stage transition duration of swallow/improve timing to reduce food falling into the airway by decreasing swallow delay after neurosensory  stimulation  Progressing as expected  -HG     Comments: Patient will improve stage transition duration of swallow/improve timing to reduce food falling into the airway by decreasing swallow delay after neurosensory  stimulation  2/19/21: Pt completed with accuracy during session x 2. 2/5/21: Pt completed with accuracy during session.  1/29/21: Pt completed with accuracy during session.  1/22/21: Pt completed each with 100% accurate x 5.  1/14/21: Pt completed both with accuracy x 3.   1/7/21: Pt completed each x 2-3x.   -HG     Patient will increase laryngeal elevation to reduce residue that might fall into airway by completing  Mendelsohn maneuver;super-supraglottic swallow;falsetto/pitch glide;with cues  -HG     Status: Patient will increase laryngeal elevation to reduce residue that might fall into airway by completing  Progressing as expected  -HG     Comments: Patient will increase laryngeal elevation to reduce residue that might fall into airway by completing  2/19/21: Pt completed with accuracy during session x 2.   2/5/21: Pt completed with accuracy during session. 1/29/21: Pt completed with accuracy during session.  1/22/21: Pt completed x 5.  1/14/21: Pt completed with accuracy for super-supraglottic swallow and falsetto- Mendelsohn remains a challenge but pt and SLP feel increased elevation. 1/7/21: Pt feels that he has a small shah apple.  Mendelsohn x 3. Falsetto/pitch completed x 3.   -HG     Patient will increase closure of larynx to keep food from falling into the airway by completing  super-supraglottic swallow;with cues  -HG     Status: Patient will increase closure of larynx to keep food from falling into the airway by completing  Progressing as expected  -HG     Comments: Patient will increase closure of larynx to keep food from falling into the airway by completing  2/19/21: Pt completed with accuracy during session x 2. Pt does not that she repeat swallow is hard to complete at times.  2/5/21: Pt completed with accuracy during the session. 1/29/21: Pt completed with accuracy during session.  1/22/21: Pt completed x 5. 1/14/21: Completed with accuracy. 1/7/12: With use of water in session, pt completed super-supraglottic swallow x 2.   -HG     Patient will increase strength of tongue base and posterior pharyngeal walls to reduce residue that might fall into airway by completing  effotful swallow;Carla (tongue hold);with cues  -HG     Status: Patient will increase strength of tongue base and posterior pharyngeal walls to reduce residue that might fall into airway by completing  Progressing as expected  -HG     Comments: Patient will increase strength of tongue base and posterior pharyngeal walls to reduce residue that might fall into airway by completing  2/19/21: Pt completed with accuracy during session x 2.  2/5/21: Pt completed with accuracy during session. 1/29/21: Pt completed with accuracy during session.  1/22/21: Pt completed x 3. 1/14/21: Carla remains difficult but continues to try to complete daily.   1/7/21: No difficulty with effortful swallow. Carla is challenging and pt made 3 attempts during session.   -HG     Patient will improve hyolaryngeal elevation via completing Luis E (head lift)  sustained lift (comment #/duration of lifts);repetitive lift (comment #/duration of lifts);with cues 30 sustained and 15 reps.  -HG     Status: Patient will improve hyolaryngeal elevation via completing Luis E (head lift)  Progressing as expected  -HG     Comments: Patient will improve hyolaryngeal elevation via completing Luis E (head lift)  2/19/21: Pt continues to complete those at home and feels a good stretch in the back of his neck.  2/5/21: Pt reported completing this morning before he got out of bed and it was tighter on his neck. 1/29/21:Pt reports completing those at home and using a timer to hold for full 30 secs and completes the repetitive lifts with no difficulties.  1/22/21:  Pt reports completing at home. 1/14/21: Pt completing at home on bench at end of bed. 1/7/21: Pt reports timing himself and is able to hold for 35 seconds and completes reps with no neck pain.   -HG        Other Goals    Other Adult Goal- 1  Pt will complete stretching of strap muscles in order to improve ROM.  -HG     Status: Other Adult Goal- 1  Progressing as expected  -HG     Comments: Other Adult Goal- 1  2/19/21: Pt completed x 3 during session.  2/5/21: Pt completing and SLP showed pt on his right side what he can do once his left side has healed.  1/29/21: Pt completes at home daily.  1/22/21: Pt is completing stretching exercise at home. 1/14/21: PT reports completing daily. 1/7/21: Pt reports completing stretching exercises.   -HG     Other Adult Goal- 2  Pt will complete MBS with RECs to follow as indicated.   -HG     Status: Other Adult Goal- 2  Progressing as expected  -HG     Comments: Other Adult Goal- 2  2/19/21: Ordered repeat MBS for post tx swallow fxn. 1/22/21: Discussed repeat MBS at conclusion of treatment post 3-4  weeks. 1/7/21: MBS results from 1/5/21: Mild-moderate pharyngeal dysphagia. Penetration/aspiration before/during the swallow w/ thin liquids 2' pre-spill, delayed initiation, and delayed/reduced vestibular closure. Aspiration was completely silent.  -HG     Other Adult Goal- 3  Pt will tolerate water between meals only after oral care.  -HG     Status: Other Adult Goal- 3  Progressing as expected  -HG     Comments: Other Adult Goal- 3  2/19/21: Water trials during session and pt exhibited a spont throat clear and no other s/s t/o session. Pt has learned at home that he must take his time, not talk and small sips when drinking water.  2/5/21: Pt did exhibit intermittent coughing and throat clearing this date with water trials. 1/29/21: Pt tolerated water during session with only one cough out of 5 drinks during session while doing exercises.   1/22/21: Pt tolerated several cup drinks of water with no s/s of aspiration. Pt used water with all of his exercises this date. 1/14/21: Pt tolerating water between meals and with exercises.   -        SLP Time Calculation    SLP Goal Re-Cert Due Date  03/21/21  -       User Key  (r) = Recorded By, (t) = Taken By, (c) = Cosigned By    Initials Name Provider Type    Iris Duran MS CCC-SLP Speech and Language Pathologist                 Time Calculation:                    Iris Zhang MS CCC-SLP  3/22/2021

## 2021-06-01 ENCOUNTER — HOSPITAL ENCOUNTER (OUTPATIENT)
Dept: PET IMAGING | Facility: HOSPITAL | Age: 76
Discharge: HOME | End: 2021-06-01

## 2021-06-01 DIAGNOSIS — C44.229 SQUAMOUS CELL CARCINOMA, EAR, LEFT: ICD-10-CM

## 2021-06-01 LAB — GLUCOSE BLDC GLUCOMTR-MCNC: 83 MG/DL (ref 70–130)

## 2021-06-01 PROCEDURE — 82962 GLUCOSE BLOOD TEST: CPT

## 2021-06-01 PROCEDURE — A9552 F18 FDG: HCPCS | Performed by: INTERNAL MEDICINE

## 2021-06-01 PROCEDURE — 0 FLUDEOXYGLUCOSE F18 SOLUTION: Performed by: INTERNAL MEDICINE

## 2021-06-01 PROCEDURE — 78815 PET IMAGE W/CT SKULL-THIGH: CPT

## 2021-06-01 RX ADMIN — FLUDEOXYGLUCOSE F18 1 DOSE: 300 INJECTION INTRAVENOUS at 08:46

## 2021-06-02 ENCOUNTER — OFFICE VISIT (OUTPATIENT)
Dept: ONCOLOGY | Facility: CLINIC | Age: 76
End: 2021-06-02

## 2021-06-02 VITALS
HEIGHT: 67 IN | TEMPERATURE: 96.9 F | DIASTOLIC BLOOD PRESSURE: 85 MMHG | RESPIRATION RATE: 16 BRPM | OXYGEN SATURATION: 97 % | HEART RATE: 93 BPM | WEIGHT: 193.1 LBS | BODY MASS INDEX: 30.31 KG/M2 | SYSTOLIC BLOOD PRESSURE: 137 MMHG

## 2021-06-02 DIAGNOSIS — C44.229 SQUAMOUS CELL CARCINOMA, EAR, LEFT: Primary | ICD-10-CM

## 2021-06-02 PROCEDURE — 99214 OFFICE O/P EST MOD 30 MIN: CPT | Performed by: INTERNAL MEDICINE

## 2021-06-02 NOTE — PROGRESS NOTES
PROBLEM LIST:  Oncology/Hematology History   Squamous cell carcinoma, ear, left   11/18/2020 Initial Diagnosis    Squamous cell carcinoma, ear, left     11/19/2020 Cancer Staged    Staging form: Cutaneous Carcinoma Of The Head And Neck, AJCC 8th Edition  - Clinical stage from 11/19/2020: Stage IV (cT1, cN2b(U), cM0) - Signed by Juan Francisco Francois MD on 12/3/2020     12/18/2020 -  Chemotherapy    OP Squamous of the Skin CISplatin (weekly) + XRT     12/21/2020 - 2/5/2021 Radiation    Radiation OncologyTreatment Course:  Ajith Gallego received 6600 cGy in 33 fractions to left neck via External Beam Radiation - EBRT.         REASON FOR VISIT: Metastatic squamous cell of the skin  HISTORY OF PRESENT ILLNESS:   75 y.o.  male presents today for follow-up of metastatic squamous cell of the skin.  Completed chemotherapy with cisplatin and radiation and early February 2021.  Presents after PET scan.  Clinically doing well.  Reports gaining weight back.    Past medical history, social history and family history was reviewed and unchanged from prior visit.    Review of Systems:    Review of Systems   Constitutional: Positive for fatigue.   HENT:  Negative.    Eyes: Negative.    Respiratory: Negative.    Cardiovascular: Negative.    Gastrointestinal: Negative.    Endocrine: Negative.    Genitourinary: Negative.     Musculoskeletal: Negative.    Skin: Negative.    Neurological: Negative.    Hematological: Negative.    Psychiatric/Behavioral: Negative.             Medications:        Current Outpatient Medications:   •  aspirin 81 MG EC tablet, Take 81 mg by mouth Daily., Disp: , Rfl:   •  bacitracin-polymyxin b (POLYSPORIN) 500-77566 UNIT/GM ointment, bacitracin zinc 500 unit-polymyxin B 10,000 unit/gram topical ointment  apply a small amount to affected area twice daily, Disp: , Rfl:   •  Bee Pollen 500 MG chewable tablet, Chew., Disp: , Rfl:   •  HYDROcodone-acetaminophen (Norco) 7.5-325 MG per tablet, Every 6  "(Six) Hours., Disp: , Rfl:   •  Lidocaine Viscous HCl (XYLOCAINE) 2 % solution, , Disp: , Rfl:   •  lisinopril (PRINIVIL,ZESTRIL) 10 MG tablet, Take 10 mg by mouth Daily., Disp: , Rfl:   •  LORATADINE PO, Take 1 tablet by mouth Daily., Disp: , Rfl:   •  multivitamin with minerals (MULTIVITAMIN ADULT PO), Take 1 tablet by mouth Daily., Disp: , Rfl:   •  ondansetron (ZOFRAN) 8 MG tablet, Take 1 tablet by mouth 3 (Three) Times a Day As Needed for Nausea or Vomiting., Disp: 30 tablet, Rfl: 5  •  ondansetron ODT (ZOFRAN-ODT) 4 MG disintegrating tablet, DISSOLVE 1 TABLET IN MOUTH EVERY 4 HOURS AS NEEDED (MAX 3 DOSES), Disp: , Rfl:   •  simvastatin (ZOCOR) 40 MG tablet, Take 40 mg by mouth Every Night., Disp: , Rfl:     Pain Medications             aspirin 81 MG EC tablet Take 81 mg by mouth Daily.    HYDROcodone-acetaminophen (Norco) 7.5-325 MG per tablet Every 6 (Six) Hours.             ALLERGIES:    Allergies   Allergen Reactions   • Penicillins Rash         Physical Exam    VITAL SIGNS:  /85   Pulse 93   Temp 96.9 °F (36.1 °C) (Temporal)   Resp 16   Ht 170.2 cm (67\")   Wt 87.6 kg (193 lb 1.6 oz)   SpO2 97%   BMI 30.24 kg/m²     Wt Readings from Last 3 Encounters:   06/02/21 87.6 kg (193 lb 1.6 oz)   03/05/21 86.1 kg (189 lb 14.4 oz)   02/03/21 85.3 kg (188 lb)       Body mass index is 30.24 kg/m². Body surface area is 1.99 meters squared.    Pain Score    06/02/21 0850   PainSc: 0-No pain         Performance Status: 0    General: well appearing, in no acute distress  HEENT: sclera anicteric, neck is supple  Lymphatics: no cervical, supraclavicular, or axillary adenopathy  Cardiovascular: regular rate and rhythm, no murmurs, rubs or gallops  Lungs: clear to auscultation bilaterally  Abdomen: soft, nontender, nondistended.  No palpable organomegaly  Extremities: no lower extremity edema  Skin: no rashes, lesions, bruising, or petechiae  Msk:  Shows no weakness of the large muscle groups  Psych: Mood is " stable        RECENT LABS:    Lab Results   Component Value Date    HGB 9.4 (L) 01/27/2021    HCT 27.9 (L) 01/27/2021    MCV 89.7 01/27/2021     (L) 01/27/2021    WBC 4.10 01/27/2021    NEUTROABS 3.10 01/27/2021    LYMPHSABS 0.60 (L) 01/27/2021    MONOSABS 0.40 01/27/2021       Lab Results   Component Value Date    GLUCOSE 112 (H) 01/27/2021    BUN 32 (H) 01/27/2021    CREATININE 1.40 01/27/2021     (L) 01/27/2021    K 4.3 01/27/2021    CL 99 01/27/2021    CO2 22.0 01/27/2021    CALCIUM 9.1 01/27/2021    PROTEINTOT 6.0 01/13/2021    ALBUMIN 3.60 01/13/2021    BILITOT 0.3 01/13/2021    ALKPHOS 80 01/13/2021    AST 16 01/13/2021    ALT 21 01/13/2021       Nm Pet Skull Base To Mid Thigh    Result Date: 11/12/2020  Abnormal activity identified correlating to bulky adenopathy posterior to the left parotid gland suggesting metastatic disease. Direct visualization is recommended at the base of the left neck to exclude possible skin lesion or fold in the skin creating very low-level activity. There is also low-level activity nodule in the left parotid gland. Continued follow-up recommended as indicated.   D:  11/09/2020 E:  11/09/2020  This report was finalized on 11/12/2020 4:07 PM by Dr. Lilia Carmen MD.          NM Pet Skull Base To Mid Thigh    Result Date: 6/1/2021  Previous areas of concern and hypermetabolism involving the left parotid gland have largely resolved in the interim with an area in the retromandibular region just deep to this previously aforementioned involvement may represent parotid involvement or an adjacent retromandibular lymph node with maximum SUV 4.0 with attention on follow-up. No distant disease is present.   D:  06/01/2021 E:  06/01/2021  This report was finalized on 6/1/2021 2:24 PM by Dr. Ankur Ogden.            Assessment/Plan    1.  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  I repeated his PET scan which shows nice improvement  of all his disease.  There is an area of concern in the left parotid region.  Okay to wait and see if this is recurrent disease.  I am going to repeat a PET scan 3 months to see if there is any changes.  If there is then may have to consider systemic therapy.    highly complex patient with discussion regarding his treatment and symptoms and management of those symptoms.    Patient has need for management and monitoring of toxicity of his chemotherapy and has a life-threatening illness that requires intervention.      Jesus Manuel Viveros MD  [facility] Hematology and Oncology    Return on: 10/06/21    Orders Placed This Encounter   Procedures   • NM Pet Skull Base To Mid Thigh       6/2/2021

## 2021-06-11 ENCOUNTER — OFFICE VISIT (OUTPATIENT)
Dept: RADIATION ONCOLOGY | Facility: HOSPITAL | Age: 76
End: 2021-06-11

## 2021-06-11 ENCOUNTER — HOSPITAL ENCOUNTER (OUTPATIENT)
Dept: RADIATION ONCOLOGY | Facility: HOSPITAL | Age: 76
Setting detail: RADIATION/ONCOLOGY SERIES
Discharge: HOME | End: 2021-06-11

## 2021-06-11 VITALS
HEART RATE: 90 BPM | TEMPERATURE: 97.1 F | DIASTOLIC BLOOD PRESSURE: 77 MMHG | OXYGEN SATURATION: 98 % | BODY MASS INDEX: 30.38 KG/M2 | RESPIRATION RATE: 18 BRPM | SYSTOLIC BLOOD PRESSURE: 160 MMHG | WEIGHT: 194 LBS

## 2021-06-11 DIAGNOSIS — C44.229 SQUAMOUS CELL CARCINOMA, EAR, LEFT: Primary | ICD-10-CM

## 2021-06-11 NOTE — PROGRESS NOTES
FOLLOW UP NOTE    PATIENT:                                                      Ajith Gallego  MEDICAL RECORD #:                        [id number]  :                                                          1945  COMPLETION DATE:    2021  DIAGNOSIS:     Squamous cell carcinoma, ear, left  - Stage IV (cT1, cN2b(U), cM0)      BRIEF HISTORY:  Mr. Gallego is a 75-year-old gentleman who was diagnosed in late  and recurrent aggressive cutaneous squamous cell carcinoma that was found to have metastasized to the left parotid gland and lymph nodes.  Was seen that this came from a previous skin cancer from his left ear.  He underwent superficial parotidectomy and lymph node dissection, and then due to multiple risk factors, he underwent a course of adjuvant radiation therapy to a cumulative dose of 66 Gray, which she completed on 2021.  Following treatment, he has recovered rather well.  He reports good energy level, with good range of tolerance as well as good sense of taste.  He denies any pain in swallowing, although he does report some tightness and discomfort in his left neck and swelling in the front part of his neck.  He recently underwent a repeat PET/CT scan, and he returns to clinic today for results.    MEDICATIONS: Medication reconciliation for the patient was reviewed and confirmed in the electronic medical record.    Review of Systems   HENT:   Positive for tinnitus (r/t heavy equiptment).    All other systems reviewed and are negative.      KPS 80%    Physical Exam  Vitals and nursing note reviewed.   Constitutional:       General: He is not in acute distress.     Appearance: He is well-developed.   HENT:      Head: Normocephalic and atraumatic.      Mouth/Throat:      Comments: Oropharynx is clear without any abnormal areas of induration, erythema, or ulceration  Eyes:      Conjunctiva/sclera: Conjunctivae normal.      Pupils: Pupils are equal, round, and reactive to light.    Neck:      Comments: Mild edema in the anterior neck, grade 1 fibrosis in the left neck with reduced range of motion to turning his head towards the left, looking upwards, as well as looking down.  There are no palpable nodules or masses.  He does have some fullness in the upper part of the left neck and points to a nodular area, which on exam I think is the hyoid bone.  Cardiovascular:      Rate and Rhythm: Normal rate and regular rhythm.      Heart sounds: No murmur heard.   No friction rub.   Pulmonary:      Effort: Pulmonary effort is normal.      Breath sounds: Normal breath sounds. No wheezing.   Abdominal:      General: Bowel sounds are normal. There is no distension.      Palpations: Abdomen is soft. There is no mass.      Tenderness: There is no abdominal tenderness.   Musculoskeletal:         General: Normal range of motion.      Cervical back: Normal range of motion and neck supple.   Lymphadenopathy:      Cervical: No cervical adenopathy.   Skin:     General: Skin is warm and dry.   Neurological:      Mental Status: He is alert and oriented to person, place, and time.   Psychiatric:         Behavior: Behavior normal.         Thought Content: Thought content normal.         Judgment: Judgment normal.         VITAL SIGNS:   Vitals:    06/11/21 0850   BP: 160/77   Pulse: 90   Resp: 18   Temp: 97.1 °F (36.2 °C)   TempSrc: Temporal   SpO2: 98%   Weight: 88 kg (194 lb)   PainSc: 0-No pain     IMAGING  I have personally reviewed the relevant imaging studies, as follows:  FL Video Swallow With Speech Single Contrast    Result Date: 3/12/2021  Fluoroscopy provided for a modified barium swallow. Please see speech therapy report for full details and recommendations.    This report was finalized on 3/12/2021 5:20 PM by Dr. Ankur Ogden.      FL Limited Ugi For Mbs Reflux Single-Contrast    Result Date: 3/12/2021  Fluoroscopy provided for a modified barium swallow. Please see speech therapy report for full details  and recommendations.    This report was finalized on 3/12/2021 5:20 PM by Dr. Ankur Ogden.      NM Pet Skull Base To Mid Thigh    Result Date: 6/1/2021  Previous areas of concern and hypermetabolism involving the left parotid gland have largely resolved in the interim with an area in the retromandibular region just deep to this previously aforementioned involvement may represent parotid involvement or an adjacent retromandibular lymph node with maximum SUV 4.0 with attention on follow-up. No distant disease is present.   D:  06/01/2021 E:  06/01/2021  This report was finalized on 6/1/2021 2:24 PM by Dr. Ankur Ogden.                The following portions of the patient's history were reviewed and updated as appropriate: allergies, current medications, past family history, past medical history, past social history, past surgical history and problem list.         Diagnoses and all orders for this visit:    1. Squamous cell carcinoma, ear, left (Primary)         IMPRESSION: Mr. 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  He is 4 months out from completion of therapy, and clinically I think he is doing very well without any clear signs of recurrence.  His PET scan shows some residual hypermetabolism in the left neck, that when taken in the context of his exam, I think is more related to posttreatment inflammation rather than any true evidence of recurrence.  I favor continued clinical observation, and I will see him back in 3 months when he is due to have a repeat PET/CT scan.    RECOMMENDATIONS:      Return in about 3 months (around 9/11/2021) for Office Visit.    Juan Francisco Francois MD

## 2021-10-04 ENCOUNTER — OFFICE VISIT (OUTPATIENT)
Dept: RADIATION ONCOLOGY | Facility: HOSPITAL | Age: 76
End: 2021-10-04

## 2021-10-04 ENCOUNTER — HOSPITAL ENCOUNTER (OUTPATIENT)
Dept: PET IMAGING | Facility: HOSPITAL | Age: 76
Discharge: HOME | End: 2021-10-04

## 2021-10-04 ENCOUNTER — HOSPITAL ENCOUNTER (OUTPATIENT)
Dept: RADIATION ONCOLOGY | Facility: HOSPITAL | Age: 76
Setting detail: RADIATION/ONCOLOGY SERIES
Discharge: HOME | End: 2021-10-04

## 2021-10-04 VITALS
HEIGHT: 67 IN | WEIGHT: 197.7 LBS | SYSTOLIC BLOOD PRESSURE: 127 MMHG | TEMPERATURE: 97.3 F | OXYGEN SATURATION: 97 % | BODY MASS INDEX: 31.03 KG/M2 | HEART RATE: 86 BPM | RESPIRATION RATE: 18 BRPM | DIASTOLIC BLOOD PRESSURE: 66 MMHG

## 2021-10-04 DIAGNOSIS — C44.229 SQUAMOUS CELL CARCINOMA, EAR, LEFT: ICD-10-CM

## 2021-10-04 LAB — GLUCOSE BLDC GLUCOMTR-MCNC: 96 MG/DL (ref 70–130)

## 2021-10-04 PROCEDURE — G0463 HOSPITAL OUTPT CLINIC VISIT: HCPCS | Performed by: RADIOLOGY

## 2021-10-04 PROCEDURE — 78815 PET IMAGE W/CT SKULL-THIGH: CPT

## 2021-10-04 PROCEDURE — 0 FLUDEOXYGLUCOSE F18 SOLUTION: Performed by: INTERNAL MEDICINE

## 2021-10-04 PROCEDURE — A9552 F18 FDG: HCPCS | Performed by: INTERNAL MEDICINE

## 2021-10-04 PROCEDURE — 82962 GLUCOSE BLOOD TEST: CPT

## 2021-10-04 RX ADMIN — FLUDEOXYGLUCOSE F18 1 DOSE: 300 INJECTION INTRAVENOUS at 09:07

## 2021-10-04 NOTE — PROGRESS NOTES
FOLLOW UP NOTE    PATIENT:                                                      Ajith Gallego  MEDICAL RECORD #:                        [id number]  :                                                          1945  COMPLETION DATE:    2021  DIAGNOSIS:     Squamous cell carcinoma, ear, left  - Stage IV (cT1, cN2b(U), cM0)    BRIEF HISTORY:  Mr. Gallego returns to clinic today for follow-up of a recurrent cutaneous squamous cell carcinoma presumed to be from his left ear, that had metastasized to the left parotid gland and lymph nodes.  He underwent superficial parotidectomy and lymph node dissection, and then due to multiple risk factors, he underwent a course of adjuvant radiation therapy to a cumulative dose of 66 Gray, which he completed on 2021.  Following treatment, he has recovered rather well.  He reports good energy level, with good saliva production, as well as a good sense of taste.  He denies any pain with swallowing.  He underwent a repeat PET/CT scan earlier today, and he returns to clinic today for results.    MEDICATIONS: Medication reconciliation for the patient was reviewed and confirmed in the electronic medical record.    Review of Systems   HENT:   Positive for tinnitus. Sore throat: r/t heavy equiptment.    All other systems reviewed and are negative.      Karnofsky score: 80     Physical Exam  Vitals and nursing note reviewed.   Constitutional:       General: He is not in acute distress.     Appearance: He is well-developed.   HENT:      Head: Normocephalic and atraumatic.      Comments: No new aggressive skin lesions are noted.     Mouth/Throat:      Comments: Oropharynx is clear.  Eyes:      Conjunctiva/sclera: Conjunctivae normal.      Pupils: Pupils are equal, round, and reactive to light.   Neck:      Comments: No palpable adenopathy bilaterally.  There is post-radiation fibrosis and tethering in the left neck along the SCM, but good range of  "motion.  Cardiovascular:      Rate and Rhythm: Normal rate and regular rhythm.      Heart sounds: No murmur heard.   No friction rub.   Pulmonary:      Effort: Pulmonary effort is normal.      Breath sounds: Normal breath sounds. No wheezing.   Abdominal:      General: Bowel sounds are normal. There is no distension.      Palpations: Abdomen is soft. There is no mass.      Tenderness: There is no abdominal tenderness.   Musculoskeletal:         General: Normal range of motion.      Cervical back: Normal range of motion and neck supple.   Lymphadenopathy:      Cervical: No cervical adenopathy.   Skin:     General: Skin is warm and dry.   Neurological:      Mental Status: He is alert and oriented to person, place, and time.   Psychiatric:         Behavior: Behavior normal.         Thought Content: Thought content normal.         Judgment: Judgment normal.         VITAL SIGNS:   Vitals:    10/04/21 1423   BP: 127/66   Pulse: 86   Resp: 18   Temp: 97.3 °F (36.3 °C)   TempSrc: Temporal   SpO2: 97%   Weight: 89.7 kg (197 lb 11.2 oz)   Height: 170.2 cm (67\")   PainSc: 0-No pain     IMAGING  I have personally reviewed the relevant imaging studies, as follows:  NM PET/CT Skull Base to Mid Thigh    Result Date: 10/4/2021  There is no evidence of hypermetabolic activity identified within the neck to suggest evidence of local recurrence or metastatic disease. Within the chest there is low-level activity correlating to the hilar lymph nodes. The findings are similar and not significantly changed in the interval. The examination is essentially negative.   D:  10/04/2021 E:  10/04/2021           Karnofsky score: 80     The following portions of the patient's history were reviewed and updated as appropriate: allergies, current medications, past family history, past medical history, past social history, past surgical history and problem list.         Diagnoses and all orders for this visit:    1. Squamous cell carcinoma, ear, " left         IMPRESSION:  Mr. Gallego is a 76-year-old gentleman who is now 8 months out from completion of therapy for a recurrent squamous cell carcinoma of the skin with metastases to the left parotid and neck.  He has recovered very well and his PET scan is clear with no concerning findings.  I again discussed with him the long-term survivorship model, and he is already scheduled for ongoing follow-up with Dr. Patten.  I recommended him today that he continue doing range of motion exercises in his left neck to minimize the impact of the post treatment fibrosis.  I plan to see him back my clinic in 1 year.    RECOMMENDATIONS:     Return in about 1 year (around 10/4/2022) for Office Visit.    Juan Francisco Francois MD

## 2021-10-06 ENCOUNTER — OFFICE VISIT (OUTPATIENT)
Dept: ONCOLOGY | Facility: CLINIC | Age: 76
End: 2021-10-06

## 2021-10-06 VITALS
RESPIRATION RATE: 16 BRPM | TEMPERATURE: 97.1 F | SYSTOLIC BLOOD PRESSURE: 148 MMHG | WEIGHT: 200.1 LBS | BODY MASS INDEX: 31.4 KG/M2 | OXYGEN SATURATION: 98 % | DIASTOLIC BLOOD PRESSURE: 82 MMHG | HEIGHT: 67 IN | HEART RATE: 95 BPM

## 2021-10-06 DIAGNOSIS — C44.229 SQUAMOUS CELL CARCINOMA, EAR, LEFT: Primary | ICD-10-CM

## 2021-10-06 PROCEDURE — 99214 OFFICE O/P EST MOD 30 MIN: CPT | Performed by: INTERNAL MEDICINE

## 2021-10-06 NOTE — PROGRESS NOTES
PROBLEM LIST:  Oncology/Hematology History   Squamous cell carcinoma, ear, left   11/18/2020 Initial Diagnosis    Squamous cell carcinoma, ear, left     11/19/2020 Cancer Staged    Staging form: Cutaneous Carcinoma Of The Head And Neck, AJCC 8th Edition  - Clinical stage from 11/19/2020: Stage IV (cT1, cN2b(U), cM0) - Signed by Juan Francisco Francois MD on 12/3/2020     12/18/2020 -  Chemotherapy    OP Squamous of the Skin CISplatin (weekly) + XRT     12/21/2020 - 2/5/2021 Radiation    Radiation OncologyTreatment Course:  Ajith Gallego received 6600 cGy in 33 fractions to left neck via External Beam Radiation - EBRT.         REASON FOR VISIT: Metastatic squamous cell of the skin  HISTORY OF PRESENT ILLNESS:   76 y.o.  male presents today for follow-up of metastatic squamous cell of the skin.  Completed chemotherapy with cisplatin and radiation and early February 2021.  Presents after PET scan.  Clinically doing well.  No issues today.    Past medical history, social history and family history was reviewed and unchanged from prior visit.    Review of Systems:    Review of Systems   Constitutional: Positive for fatigue.   HENT:  Negative.    Eyes: Negative.    Respiratory: Negative.    Cardiovascular: Negative.    Gastrointestinal: Negative.    Endocrine: Negative.    Genitourinary: Negative.     Musculoskeletal: Negative.    Skin: Negative.    Neurological: Negative.    Hematological: Negative.    Psychiatric/Behavioral: Negative.             Medications:        Current Outpatient Medications:   •  aspirin 81 MG EC tablet, Take 81 mg by mouth Daily., Disp: , Rfl:   •  bacitracin-polymyxin b (POLYSPORIN) 500-58553 UNIT/GM ointment, bacitracin zinc 500 unit-polymyxin B 10,000 unit/gram topical ointment  apply a small amount to affected area twice daily, Disp: , Rfl:   •  Bee Pollen 500 MG chewable tablet, Chew., Disp: , Rfl:   •  HYDROcodone-acetaminophen (Norco) 7.5-325 MG per tablet, Every 6 (Six) Hours.,  "Disp: , Rfl:   •  Lidocaine Viscous HCl (XYLOCAINE) 2 % solution, , Disp: , Rfl:   •  lisinopril (PRINIVIL,ZESTRIL) 10 MG tablet, Take 10 mg by mouth Daily., Disp: , Rfl:   •  LORATADINE PO, Take 1 tablet by mouth Daily., Disp: , Rfl:   •  multivitamin with minerals (MULTIVITAMIN ADULT PO), Take 1 tablet by mouth Daily., Disp: , Rfl:   •  ondansetron (ZOFRAN) 8 MG tablet, Take 1 tablet by mouth 3 (Three) Times a Day As Needed for Nausea or Vomiting., Disp: 30 tablet, Rfl: 5  •  ondansetron ODT (ZOFRAN-ODT) 4 MG disintegrating tablet, DISSOLVE 1 TABLET IN MOUTH EVERY 4 HOURS AS NEEDED (MAX 3 DOSES), Disp: , Rfl:   •  simvastatin (ZOCOR) 40 MG tablet, Take 40 mg by mouth Every Night., Disp: , Rfl:     Pain Medications             aspirin 81 MG EC tablet Take 81 mg by mouth Daily.    HYDROcodone-acetaminophen (Norco) 7.5-325 MG per tablet Every 6 (Six) Hours.             ALLERGIES:    Allergies   Allergen Reactions   • Penicillins Rash         Physical Exam    VITAL SIGNS:  /82   Pulse 95   Temp 97.1 °F (36.2 °C) (Temporal)   Resp 16   Ht 170.2 cm (67\")   Wt 90.8 kg (200 lb 1.6 oz)   SpO2 98%   BMI 31.34 kg/m²     Wt Readings from Last 3 Encounters:   10/06/21 90.8 kg (200 lb 1.6 oz)   10/04/21 89.7 kg (197 lb 11.2 oz)   06/11/21 88 kg (194 lb)       Body mass index is 31.34 kg/m². Body surface area is 2.02 meters squared.    Pain Score    10/06/21 0922   PainSc: 0-No pain         Performance Status: 0    General: well appearing, in no acute distress  HEENT: sclera anicteric, neck is supple  Lymphatics: no cervical, supraclavicular, or axillary adenopathy  Cardiovascular: regular rate and rhythm, no murmurs, rubs or gallops  Lungs: clear to auscultation bilaterally  Abdomen: soft, nontender, nondistended.  No palpable organomegaly  Extremities: no lower extremity edema  Skin: no rashes, lesions, bruising, or petechiae  Msk:  Shows no weakness of the large muscle groups  Psych: Mood is stable        RECENT " LABS:    Lab Results   Component Value Date    HGB 9.4 (L) 01/27/2021    HCT 27.9 (L) 01/27/2021    MCV 89.7 01/27/2021     (L) 01/27/2021    WBC 4.10 01/27/2021    NEUTROABS 3.10 01/27/2021    LYMPHSABS 0.60 (L) 01/27/2021    MONOSABS 0.40 01/27/2021       Lab Results   Component Value Date    GLUCOSE 112 (H) 01/27/2021    BUN 32 (H) 01/27/2021    CREATININE 1.40 01/27/2021     (L) 01/27/2021    K 4.3 01/27/2021    CL 99 01/27/2021    CO2 22.0 01/27/2021    CALCIUM 9.1 01/27/2021    PROTEINTOT 6.0 01/13/2021    ALBUMIN 3.60 01/13/2021    BILITOT 0.3 01/13/2021    ALKPHOS 80 01/13/2021    AST 16 01/13/2021    ALT 21 01/13/2021     EXAMINATION: NUCLEAR MEDICINE PET, SKULL BASE TO MID THIGH-10/04/2021:     INDICATION: Metastatic squamous cell carcinoma with an area of concern  near the parotid; C44.229-Squamous cell carcinoma of skin of left ear  and external auricular canal, history of squamous cell carcinoma.     TECHNIQUE: With fasting blood glucose level of 96 mg/dL, a total of  12.84 mCi of FDG was administered via the right antecubital vein.  Following appropriate delay, PET/CT imaging was obtained from the skull  vertex to the mid thighs bilaterally and fused multiplanar images were  reconstructed. The CT scan is an unenhanced study and used for reference  to the PET scan only and should not be considered a diagnostic CT scan.  PET/CT imaging was reviewed in the axial, coronal, and sagittal planes  as well as within the cine mode.     The radiation dose reduction device was turned on as low as reasonably  achievable for each scan per ALARA protocol.     COMPARISON: NONE.     FINDINGS: There is normal variant activity identified within the  oropharynx and muscles of phonation. Normal variant activity identified  in the region of the vocal cords. No evidence of hypermetabolic activity  identified within the neck to suggest evidence of malignancy or  metastatic disease. There is some stranding  identified suggesting  postradiation changes to the left neck and face. The findings are stable  and unchanged when compared to the prior study. There is no focal area  of hypermetabolic activity identified within the neck to suggest  evidence of an area of local recurrence or metastasis. Multiple surgical  clips identified within the left neck. The patient is status post  radical left neck dissection. No adenopathy identified in the  supraclavicular regions. The chest reveals normal variant activity seen  within the heart. Low-level areas of activity identified in the hilar  regions bilaterally. Hypermetabolic activity with maximum SUV of 4.0  seen in the right hilar region and previously noted activity was 3.26 in  the right suprahilar region. The right infrahilar region previously  demonstrated hypermetabolic activity of 3.6 maximum SUV and today this  area demonstrates hypermetabolic activity of 3.4. The findings may  suggest old healed granulomatous disease. There is no evidence of  enlargement of the lymph nodes in the hilar regions. The findings are  essentially stable. The chest is otherwise unremarkable. The abdomen and  pelvis reveals normal variant activity seen within the GI and  tract.  No hypermetabolic activity identified to suggest evidence of malignancy  or metastatic disease. Upper thighs are unremarkable in appearance.     IMPRESSION:  There is no evidence of hypermetabolic activity identified  within the neck to suggest evidence of local recurrence or metastatic  disease. Within the chest there is low-level activity correlating to the  hilar lymph nodes. The findings are similar and not significantly  changed in the interval. The examination is essentially negative.      D:  10/04/2021  E:  10/04/2021      Assessment/Plan    1.  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  I repeated his PET scan which shows complete improvement of the area of concern.   At this time he is GIL.  At this point I am going to just avail him periodically.  I will repeat CAT scans of the neck and chest in 6 months and see where we stand with everything.    Total time of patient care on day of service including time prior to, face to face with patient, and following visit spent in reviewing records, lab results, imaging studies, discussion with patient, and documentation/charting was > 25 minutes.        Jesus Manuel Viveros MD  [facility]    Return on: 04/07/22  Return in (Approximately): 6 months    Orders Placed This Encounter   Procedures   • CT Chest With Contrast Diagnostic   • CT Soft Tissue Neck With Contrast       10/6/2021

## 2021-10-26 ENCOUNTER — HOSPITAL ENCOUNTER (OUTPATIENT)
Dept: CT IMAGING | Facility: HOSPITAL | Age: 76
Discharge: HOME | End: 2021-10-26
Admitting: INTERNAL MEDICINE

## 2021-10-26 DIAGNOSIS — C44.229 SQUAMOUS CELL CARCINOMA, EAR, LEFT: ICD-10-CM

## 2021-10-26 LAB — CREAT BLDA-MCNC: 1.5 MG/DL (ref 0.6–1.3)

## 2021-10-26 PROCEDURE — 82565 ASSAY OF CREATININE: CPT

## 2021-10-26 PROCEDURE — 25010000002 IOPAMIDOL 61 % SOLUTION: Performed by: INTERNAL MEDICINE

## 2021-10-26 PROCEDURE — 70491 CT SOFT TISSUE NECK W/DYE: CPT

## 2021-10-26 PROCEDURE — 71260 CT THORAX DX C+: CPT

## 2021-10-26 RX ADMIN — IOPAMIDOL 98 ML: 612 INJECTION, SOLUTION INTRAVENOUS at 08:44

## 2021-10-29 ENCOUNTER — TELEPHONE (OUTPATIENT)
Dept: RADIATION ONCOLOGY | Facility: HOSPITAL | Age: 76
End: 2021-10-29

## 2021-10-29 ENCOUNTER — TELEPHONE (OUTPATIENT)
Dept: ONCOLOGY | Facility: CLINIC | Age: 76
End: 2021-10-29

## 2021-10-29 NOTE — TELEPHONE ENCOUNTER
Received call from Dr. Francois nurse at this time nurse stating patient had called to say that patient left message requesting the results of his scan. Nurse stating she would discuss with Dr. Snow and call patient.    Upon calling patient nurse discussed with Dr. Snow. Informing patient that Dr. Snow stating scans are stable.

## 2021-10-29 NOTE — TELEPHONE ENCOUNTER
Pt left VM asking if I could ask Dr. Snow's office to call him r/t recent scans-I contacted Hilda ( Dr. Snow's nurse) and she will have Dr. Snow review the scans and contact the pt.  Pt notified of the above-verbalized understanding

## 2022-01-28 ENCOUNTER — TRANSCRIBE ORDERS (OUTPATIENT)
Dept: ADMINISTRATIVE | Facility: HOSPITAL | Age: 77
End: 2022-01-28

## 2022-01-28 DIAGNOSIS — C79.9: Primary | ICD-10-CM

## 2022-03-28 ENCOUNTER — TELEPHONE (OUTPATIENT)
Dept: ONCOLOGY | Facility: CLINIC | Age: 77
End: 2022-03-28

## 2022-03-28 NOTE — TELEPHONE ENCOUNTER
Caller: Ajith Gallego    Relationship: Self    Best call back number: [phone]    What is the best time to reach you: ANYTIME    Who are you requesting to speak with (clinical staff, provider,  specific staff member): DR REIS OR HIS NURSE     Do you know the name of the person who called: AJITH    What was the call regarding:     HAS APPOINTMENT TO FOLLOW UP WITH DR BRONSON ON 04/06/22, HE HAD WANTED AJITH TO COMPLETE CT CHEST AND NECK , HAS ONE SCHEDULED 05/11/22    ORDERED BY Adams Huber MD     WANTED TO KNOW IF DR BRONSON WANTED TO KEEP APPOINTMENT ON 04/06/22 AND OR WAIT UNTIL HAS SCANS COMPLETED 05/11 AND ALSO DOES DR BRONSON WANT TO USE THE SAME CT SCANS ORDERED BY BY DR HUBER ?      Do you require a callback: YES

## 2022-05-11 ENCOUNTER — HOSPITAL ENCOUNTER (OUTPATIENT)
Dept: CT IMAGING | Facility: HOSPITAL | Age: 77
Discharge: HOME | End: 2022-05-11
Admitting: OTOLARYNGOLOGY

## 2022-05-11 DIAGNOSIS — C79.9: ICD-10-CM

## 2022-05-11 LAB — CREAT BLDA-MCNC: 1.4 MG/DL (ref 0.6–1.3)

## 2022-05-11 PROCEDURE — 82565 ASSAY OF CREATININE: CPT

## 2022-05-11 PROCEDURE — 71260 CT THORAX DX C+: CPT

## 2022-05-11 PROCEDURE — 25010000002 IOPAMIDOL 61 % SOLUTION: Performed by: OTOLARYNGOLOGY

## 2022-05-11 PROCEDURE — 70491 CT SOFT TISSUE NECK W/DYE: CPT

## 2022-05-11 RX ADMIN — IOPAMIDOL 80 ML: 612 INJECTION, SOLUTION INTRAVENOUS at 11:07

## 2022-05-16 ENCOUNTER — LAB (OUTPATIENT)
Dept: LAB | Facility: HOSPITAL | Age: 77
End: 2022-05-16

## 2022-05-16 ENCOUNTER — OFFICE VISIT (OUTPATIENT)
Dept: ONCOLOGY | Facility: CLINIC | Age: 77
End: 2022-05-16

## 2022-05-16 VITALS
BODY MASS INDEX: 31.55 KG/M2 | SYSTOLIC BLOOD PRESSURE: 142 MMHG | HEART RATE: 86 BPM | WEIGHT: 201 LBS | HEIGHT: 67 IN | DIASTOLIC BLOOD PRESSURE: 66 MMHG | RESPIRATION RATE: 18 BRPM | TEMPERATURE: 97.1 F | OXYGEN SATURATION: 97 %

## 2022-05-16 DIAGNOSIS — C44.229 SQUAMOUS CELL CARCINOMA, EAR, LEFT: ICD-10-CM

## 2022-05-16 DIAGNOSIS — R53.83 CHEMOTHERAPY-INDUCED FATIGUE: ICD-10-CM

## 2022-05-16 DIAGNOSIS — C44.229 SQUAMOUS CELL CARCINOMA, EAR, LEFT: Primary | ICD-10-CM

## 2022-05-16 DIAGNOSIS — T45.1X5A CHEMOTHERAPY-INDUCED FATIGUE: ICD-10-CM

## 2022-05-16 LAB — TSH SERPL DL<=0.05 MIU/L-ACNC: 4.8 UIU/ML (ref 0.27–4.2)

## 2022-05-16 PROCEDURE — 99214 OFFICE O/P EST MOD 30 MIN: CPT | Performed by: INTERNAL MEDICINE

## 2022-05-16 PROCEDURE — 84443 ASSAY THYROID STIM HORMONE: CPT

## 2022-05-16 PROCEDURE — 36415 COLL VENOUS BLD VENIPUNCTURE: CPT

## 2022-05-16 RX ORDER — BICALUTAMIDE 50 MG/1
TABLET, FILM COATED ORAL
COMMUNITY
End: 2022-10-17

## 2022-05-16 NOTE — PROGRESS NOTES
PROBLEM LIST:  Oncology/Hematology History   Squamous cell carcinoma, ear, left   11/18/2020 Initial Diagnosis    Squamous cell carcinoma, ear, left     11/19/2020 Cancer Staged    Staging form: Cutaneous Carcinoma Of The Head And Neck, AJCC 8th Edition  - Clinical stage from 11/19/2020: Stage IV (cT1, cN2b(U), cM0) - Signed by Juan Francisco Francois MD on 12/3/2020     12/18/2020 -  Chemotherapy    OP Squamous of the Skin CISplatin (weekly) + XRT     12/21/2020 - 2/5/2021 Radiation    Radiation OncologyTreatment Course:  Ajith Gallego received 6600 cGy in 33 fractions to left neck via External Beam Radiation - EBRT.         REASON FOR VISIT: Metastatic squamous cell of the skin  HISTORY OF PRESENT ILLNESS:   76 y.o.  male presents today for follow-up of metastatic squamous cell of the skin.  Completed chemotherapy with cisplatin and radiation and early February 2021.  He is having some fatigue.  But otherwise doing well.  No difficulty swallowing.  Almost back to normal.    Past medical history, social history and family history was reviewed and unchanged from prior visit.    Review of Systems:    Review of Systems   Constitutional: Positive for fatigue.   HENT:  Negative.    Eyes: Negative.    Respiratory: Negative.    Cardiovascular: Negative.    Gastrointestinal: Negative.    Endocrine: Negative.    Genitourinary: Negative.     Musculoskeletal: Negative.    Skin: Negative.    Neurological: Negative.    Hematological: Negative.    Psychiatric/Behavioral: Negative.             Medications:        Current Outpatient Medications:   •  aspirin 81 MG EC tablet, Take 81 mg by mouth Daily., Disp: , Rfl:   •  bicalutamide (Casodex) 50 MG chemo tablet, Casodex 50 mg tablet  Take 1 tablet every day by oral route., Disp: , Rfl:   •  Lidocaine Viscous HCl (XYLOCAINE) 2 % solution, , Disp: , Rfl:   •  lisinopril (PRINIVIL,ZESTRIL) 10 MG tablet, Take 10 mg by mouth Daily., Disp: , Rfl:   •  LORATADINE PO, Take 1  "tablet by mouth Daily., Disp: , Rfl:   •  multivitamin with minerals (MULTIVITAMIN ADULT PO), Take 1 tablet by mouth Daily., Disp: , Rfl:   •  ondansetron (ZOFRAN) 8 MG tablet, Take 1 tablet by mouth 3 (Three) Times a Day As Needed for Nausea or Vomiting., Disp: 30 tablet, Rfl: 5  •  simvastatin (ZOCOR) 40 MG tablet, Take 40 mg by mouth Every Night., Disp: , Rfl:     Pain Medications             aspirin 81 MG EC tablet Take 81 mg by mouth Daily.             ALLERGIES:    Allergies   Allergen Reactions   • Penicillins Rash         Physical Exam    VITAL SIGNS:  /66   Pulse 86   Temp 97.1 °F (36.2 °C) (Temporal)   Resp 18   Ht 170.2 cm (67.01\")   Wt 91.2 kg (201 lb)   SpO2 97%   BMI 31.47 kg/m²     Wt Readings from Last 3 Encounters:   05/16/22 91.2 kg (201 lb)   10/06/21 90.8 kg (200 lb 1.6 oz)   10/04/21 89.7 kg (197 lb 11.2 oz)       Body mass index is 31.47 kg/m². Body surface area is 2.03 meters squared.    Pain Score    05/16/22 1040   PainSc: 0-No pain         Performance Status: 0    General: well appearing, in no acute distress  HEENT: sclera anicteric, neck is supple  Lymphatics: no cervical, supraclavicular, or axillary adenopathy  Cardiovascular: regular rate and rhythm, no murmurs, rubs or gallops  Lungs: clear to auscultation bilaterally  Abdomen: soft, nontender, nondistended.  No palpable organomegaly  Extremities: no lower extremity edema  Skin: no rashes, lesions, bruising, or petechiae  Msk:  Shows no weakness of the large muscle groups  Psych: Mood is stable        RECENT LABS:    Lab Results   Component Value Date    HGB 9.4 (L) 01/27/2021    HCT 27.9 (L) 01/27/2021    MCV 89.7 01/27/2021     (L) 01/27/2021    WBC 4.10 01/27/2021    NEUTROABS 3.10 01/27/2021    LYMPHSABS 0.60 (L) 01/27/2021    MONOSABS 0.40 01/27/2021       Lab Results   Component Value Date    GLUCOSE 112 (H) 01/27/2021    BUN 32 (H) 01/27/2021    CREATININE 1.40 (H) 05/11/2022     (L) 01/27/2021    K " 4.3 01/27/2021    CL 99 01/27/2021    CO2 22.0 01/27/2021    CALCIUM 9.1 01/27/2021    PROTEINTOT 6.0 01/13/2021    ALBUMIN 3.60 01/13/2021    BILITOT 0.3 01/13/2021    ALKPHOS 80 01/13/2021    AST 16 01/13/2021    ALT 21 01/13/2021     CT Soft Tissue Neck With Contrast    Result Date: 5/11/2022  Stable postoperative and posttreatment changes of the left neck, without evidence of recurrent soft tissue mass or pathologic cervical lymphadenopathy to suggest recurrence or metastatic involvement.  No evidence of metastatic or recurrent disease in the chest. No acute findings.  This report was finalized on 5/11/2022 12:15 PM by Nguyễn Milton.      CT Chest With Contrast Diagnostic    Result Date: 5/11/2022  Stable postoperative and posttreatment changes of the left neck, without evidence of recurrent soft tissue mass or pathologic cervical lymphadenopathy to suggest recurrence or metastatic involvement.  No evidence of metastatic or recurrent disease in the chest. No acute findings.  This report was finalized on 5/11/2022 12:15 PM by Nguyễn Milton.          Assessment/Plan    1.  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  Seen by Dr. Patten recently.  Exam was normal.  Imaging of the chest and soft tissue of the neck was clear.  At this point I will see him in 6 months.    2.  Fatigue.  We will check thyroid function since he had radiation to his neck.    Total time of patient care on day of service including time prior to, face to face with patient, and following visit spent in reviewing records, lab results, imaging studies, discussion with patient, and documentation/charting was > 25 minutes.        Jesus Manuel Viveros MD  [facility] Hematology and On[facility]         No orders of the defined types were placed in this encounter.      5/16/2022

## 2022-09-09 ENCOUNTER — TRANSCRIBE ORDERS (OUTPATIENT)
Dept: ADMINISTRATIVE | Facility: HOSPITAL | Age: 77
End: 2022-09-09

## 2022-09-09 DIAGNOSIS — IMO0002 METASTATIC SQUAMOUS CELL CARCINOMA: Primary | ICD-10-CM

## 2022-10-04 ENCOUNTER — TRANSCRIBE ORDERS (OUTPATIENT)
Dept: ADMINISTRATIVE | Facility: HOSPITAL | Age: 77
End: 2022-10-04

## 2022-10-04 DIAGNOSIS — C79.9 METASTATIC CARCINOMA: Primary | ICD-10-CM

## 2022-10-05 ENCOUNTER — HOSPITAL ENCOUNTER (OUTPATIENT)
Dept: PET IMAGING | Facility: HOSPITAL | Age: 77
End: 2022-10-05

## 2022-10-06 ENCOUNTER — HOSPITAL ENCOUNTER (OUTPATIENT)
Dept: CT IMAGING | Facility: HOSPITAL | Age: 77
Discharge: HOME | End: 2022-10-06
Admitting: OTOLARYNGOLOGY

## 2022-10-06 ENCOUNTER — HOSPITAL ENCOUNTER (OUTPATIENT)
Dept: RADIATION ONCOLOGY | Facility: HOSPITAL | Age: 77
Setting detail: RADIATION/ONCOLOGY SERIES
Discharge: HOME | End: 2022-10-06

## 2022-10-06 DIAGNOSIS — C79.9 METASTATIC CARCINOMA: ICD-10-CM

## 2022-10-06 LAB — CREAT BLDA-MCNC: 1.9 MG/DL (ref 0.6–1.3)

## 2022-10-06 PROCEDURE — 70491 CT SOFT TISSUE NECK W/DYE: CPT

## 2022-10-06 PROCEDURE — 25010000002 IOPAMIDOL 61 % SOLUTION: Performed by: OTOLARYNGOLOGY

## 2022-10-06 PROCEDURE — 82565 ASSAY OF CREATININE: CPT

## 2022-10-06 RX ADMIN — IOPAMIDOL 68 ML: 612 INJECTION, SOLUTION INTRAVENOUS at 15:30

## 2022-10-17 ENCOUNTER — OFFICE VISIT (OUTPATIENT)
Dept: RADIATION ONCOLOGY | Facility: HOSPITAL | Age: 77
End: 2022-10-17

## 2022-10-17 VITALS
OXYGEN SATURATION: 96 % | RESPIRATION RATE: 16 BRPM | DIASTOLIC BLOOD PRESSURE: 70 MMHG | HEIGHT: 67 IN | WEIGHT: 199.9 LBS | BODY MASS INDEX: 31.37 KG/M2 | HEART RATE: 72 BPM | TEMPERATURE: 96.9 F | SYSTOLIC BLOOD PRESSURE: 148 MMHG

## 2022-10-17 DIAGNOSIS — C44.229 SQUAMOUS CELL CARCINOMA, EAR, LEFT: Primary | ICD-10-CM

## 2022-10-17 PROCEDURE — G0463 HOSPITAL OUTPT CLINIC VISIT: HCPCS

## 2022-11-21 ENCOUNTER — LAB (OUTPATIENT)
Dept: LAB | Facility: HOSPITAL | Age: 77
End: 2022-11-21

## 2022-11-21 ENCOUNTER — OFFICE VISIT (OUTPATIENT)
Dept: ONCOLOGY | Facility: CLINIC | Age: 77
End: 2022-11-21

## 2022-11-21 VITALS
HEART RATE: 90 BPM | BODY MASS INDEX: 31.39 KG/M2 | HEIGHT: 67 IN | SYSTOLIC BLOOD PRESSURE: 141 MMHG | WEIGHT: 200 LBS | OXYGEN SATURATION: 98 % | TEMPERATURE: 97.7 F | RESPIRATION RATE: 18 BRPM | DIASTOLIC BLOOD PRESSURE: 80 MMHG

## 2022-11-21 DIAGNOSIS — T45.1X5A CHEMOTHERAPY-INDUCED FATIGUE: ICD-10-CM

## 2022-11-21 DIAGNOSIS — R53.83 CHEMOTHERAPY-INDUCED FATIGUE: ICD-10-CM

## 2022-11-21 DIAGNOSIS — C44.229 SQUAMOUS CELL CARCINOMA, EAR, LEFT: ICD-10-CM

## 2022-11-21 DIAGNOSIS — C44.229 SQUAMOUS CELL CARCINOMA, EAR, LEFT: Primary | ICD-10-CM

## 2022-11-21 DIAGNOSIS — T66.XXXA ADVERSE EFFECT OF RADIATION, INITIAL ENCOUNTER: ICD-10-CM

## 2022-11-21 LAB — TSH SERPL DL<=0.05 MIU/L-ACNC: 3.57 UIU/ML (ref 0.27–4.2)

## 2022-11-21 PROCEDURE — 84443 ASSAY THYROID STIM HORMONE: CPT

## 2022-11-21 PROCEDURE — 36415 COLL VENOUS BLD VENIPUNCTURE: CPT

## 2022-11-21 PROCEDURE — 99213 OFFICE O/P EST LOW 20 MIN: CPT | Performed by: INTERNAL MEDICINE

## 2022-11-21 NOTE — PROGRESS NOTES
PROBLEM LIST:  Oncology/Hematology History   Squamous cell carcinoma, ear, left   11/18/2020 Initial Diagnosis    Squamous cell carcinoma, ear, left     11/19/2020 Cancer Staged    Staging form: Cutaneous Carcinoma Of The Head And Neck, AJCC 8th Edition  - Clinical stage from 11/19/2020: Stage IV (cT1, cN2b(U), cM0) - Signed by Juan Francisco Francois MD on 12/3/2020     12/18/2020 - 1/27/2021 Chemotherapy    OP Squamous of the Skin CISplatin (weekly) + XRT     12/21/2020 - 2/5/2021 Radiation    Radiation OncologyTreatment Course:  Ajith Gallego received 6600 cGy in 33 fractions to left neck via External Beam Radiation - EBRT.         REASON FOR VISIT: Metastatic squamous cell of the skin  HISTORY OF PRESENT ILLNESS:   77 y.o.  male presents today for follow-up of metastatic squamous cell of the skin.  Completed chemotherapy with cisplatin and radiation and early February 2021.  He is having some fatigue.  He has a small skin lesion on his left wrist that was recently resected.  Is waiting dermatology to remove most of it.  Otherwise doing fine.  He is scheduled to see Dr. Patten this coming week.    Past medical history, social history and family history was reviewed and unchanged from prior visit.    Review of Systems:    Review of Systems   Constitutional: Positive for fatigue.   HENT:  Negative.    Eyes: Negative.    Respiratory: Negative.    Cardiovascular: Negative.    Gastrointestinal: Negative.    Endocrine: Negative.    Genitourinary: Negative.     Musculoskeletal: Negative.    Skin: Positive for wound.   Neurological: Negative.    Hematological: Negative.    Psychiatric/Behavioral: Negative.             Medications:        Current Outpatient Medications:   •  aspirin 81 MG EC tablet, Take 81 mg by mouth Daily., Disp: , Rfl:   •  lisinopril (PRINIVIL,ZESTRIL) 10 MG tablet, Take 10 mg by mouth Daily., Disp: , Rfl:   •  LORATADINE PO, Take 1 tablet by mouth Daily., Disp: , Rfl:   •  multivitamin  "with minerals tablet tablet, Take 1 tablet by mouth Daily., Disp: , Rfl:   •  simvastatin (ZOCOR) 40 MG tablet, Take 40 mg by mouth Every Night., Disp: , Rfl:     Pain Medications             aspirin 81 MG EC tablet Take 81 mg by mouth Daily.             ALLERGIES:    Allergies   Allergen Reactions   • Penicillins Rash         Physical Exam    VITAL SIGNS:  /80 Comment: LUE  Pulse 90   Temp 97.7 °F (36.5 °C) (Infrared)   Resp 18   Ht 170.2 cm (67\")   Wt 90.7 kg (200 lb)   SpO2 98% Comment: RA  BMI 31.32 kg/m²     Wt Readings from Last 3 Encounters:   11/21/22 90.7 kg (200 lb)   10/17/22 90.7 kg (199 lb 14.4 oz)   05/16/22 91.2 kg (201 lb)       Body mass index is 31.32 kg/m². Body surface area is 2.02 meters squared.    Pain Score    11/21/22 1015   PainSc: 0-No pain         Performance Status: 0    General: well appearing, in no acute distress  HEENT: sclera anicteric, neck is supple  Lymphatics: no cervical, supraclavicular, or axillary adenopathy  Cardiovascular: regular rate and rhythm, no murmurs, rubs or gallops  Lungs: clear to auscultation bilaterally  Abdomen: soft, nontender, nondistended.  No palpable organomegaly  Extremities: no lower extremity edema  Skin: no rashes, lesions, bruising, or petechiae  Msk:  Shows no weakness of the large muscle groups  Psych: Mood is stable        RECENT LABS:    Lab Results   Component Value Date    HGB 9.4 (L) 01/27/2021    HCT 27.9 (L) 01/27/2021    MCV 89.7 01/27/2021     (L) 01/27/2021    WBC 4.10 01/27/2021    NEUTROABS 3.10 01/27/2021    LYMPHSABS 0.60 (L) 01/27/2021    MONOSABS 0.40 01/27/2021       Lab Results   Component Value Date    GLUCOSE 112 (H) 01/27/2021    BUN 32 (H) 01/27/2021    CREATININE 1.90 (H) 10/06/2022     (L) 01/27/2021    K 4.3 01/27/2021    CL 99 01/27/2021    CO2 22.0 01/27/2021    CALCIUM 9.1 01/27/2021    PROTEINTOT 6.0 01/13/2021    ALBUMIN 3.60 01/13/2021    BILITOT 0.3 01/13/2021    ALKPHOS 80 01/13/2021    " AST 16 01/13/2021    ALT 21 01/13/2021     CT Soft Tissue Neck With Contrast    Result Date: 5/11/2022  Stable postoperative and posttreatment changes of the left neck, without evidence of recurrent soft tissue mass or pathologic cervical lymphadenopathy to suggest recurrence or metastatic involvement.  No evidence of metastatic or recurrent disease in the chest. No acute findings.  This report was finalized on 5/11/2022 12:15 PM by Nguyễn Milton.      CT Chest With Contrast Diagnostic    Result Date: 5/11/2022  Stable postoperative and posttreatment changes of the left neck, without evidence of recurrent soft tissue mass or pathologic cervical lymphadenopathy to suggest recurrence or metastatic involvement.  No evidence of metastatic or recurrent disease in the chest. No acute findings.  This report was finalized on 5/11/2022 12:15 PM by Nguyễn Milton.          Assessment/Plan    1.  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      Imaging of the chest and soft tissue of the neck was clear.  At this time his risk is lower for recurrence.  He is almost a year and a half out from completion of treatment.  Plan to see him in 1 week    2.  Fatigue.  We will check thyroid function since he had radiation to his neck.          Jesus Manuel Viveros MD  [facility] Hematology and Oncology    Return in (Approximately): 1 year    Orders Placed This Encounter   Procedures   • TSH       11/21/2022

## 2023-11-20 ENCOUNTER — OFFICE VISIT (OUTPATIENT)
Dept: RADIATION ONCOLOGY | Facility: HOSPITAL | Age: 78
End: 2023-11-20
Payer: MEDICARE

## 2023-11-20 ENCOUNTER — HOSPITAL ENCOUNTER (OUTPATIENT)
Dept: RADIATION ONCOLOGY | Facility: HOSPITAL | Age: 78
Setting detail: RADIATION/ONCOLOGY SERIES
Discharge: HOME | End: 2023-11-20
Payer: MEDICARE

## 2023-11-20 ENCOUNTER — OFFICE VISIT (OUTPATIENT)
Dept: ONCOLOGY | Facility: CLINIC | Age: 78
End: 2023-11-20
Payer: MEDICARE

## 2023-11-20 VITALS
HEIGHT: 67 IN | RESPIRATION RATE: 20 BRPM | DIASTOLIC BLOOD PRESSURE: 81 MMHG | OXYGEN SATURATION: 99 % | BODY MASS INDEX: 31.86 KG/M2 | SYSTOLIC BLOOD PRESSURE: 130 MMHG | HEART RATE: 80 BPM | TEMPERATURE: 97.7 F | WEIGHT: 203 LBS

## 2023-11-20 VITALS
OXYGEN SATURATION: 94 % | TEMPERATURE: 96.2 F | DIASTOLIC BLOOD PRESSURE: 59 MMHG | WEIGHT: 203.4 LBS | BODY MASS INDEX: 31.86 KG/M2 | HEART RATE: 84 BPM | RESPIRATION RATE: 18 BRPM | SYSTOLIC BLOOD PRESSURE: 133 MMHG

## 2023-11-20 DIAGNOSIS — C44.229 SQUAMOUS CELL CARCINOMA, EAR, LEFT: Primary | ICD-10-CM

## 2023-11-20 PROCEDURE — G0463 HOSPITAL OUTPT CLINIC VISIT: HCPCS | Performed by: RADIOLOGY

## 2023-11-20 PROCEDURE — 99213 OFFICE O/P EST LOW 20 MIN: CPT | Performed by: INTERNAL MEDICINE

## 2023-11-20 PROCEDURE — 1126F AMNT PAIN NOTED NONE PRSNT: CPT | Performed by: INTERNAL MEDICINE

## 2023-11-20 RX ORDER — BICALUTAMIDE 50 MG/1
50 TABLET, FILM COATED ORAL DAILY
COMMUNITY

## 2023-11-20 RX ORDER — UBIDECARENONE 100 MG
100 CAPSULE ORAL DAILY
COMMUNITY

## 2023-11-20 NOTE — LETTER
November 20, 2023       No Recipients    Patient: Ajith Gallego   YOB: 1945   Date of Visit: 11/20/2023     Dear Marlon Romero MD:       Thank you for referring Ajith Gallego to me for evaluation. Below are the relevant portions of my assessment and plan of care.    If you have questions, please do not hesitate to call me. I look forward to following Ajith along with you.         Sincerely,        Jesus Manuel Viveros MD        CC:   No Recipients    Jesus Manuel Viveros MD  11/20/23 1047  Sign when Signing Visit  PROBLEM LIST:  Oncology/Hematology History   Squamous cell carcinoma, ear, left   11/18/2020 Initial Diagnosis    Squamous cell carcinoma, ear, left     11/19/2020 Cancer Staged    Staging form: Cutaneous Carcinoma Of The Head And Neck, AJCC 8th Edition  - Clinical stage from 11/19/2020: Stage IV (cT1, cN2b(U), cM0) - Signed by Juan Francisco Francois MD on 12/3/2020     12/18/2020 - 1/27/2021 Chemotherapy    OP Squamous of the Skin CISplatin (weekly) + XRT     12/21/2020 - 2/5/2021 Radiation    Radiation OncologyTreatment Course:  Ajith Gallego received 6600 cGy in 33 fractions to left neck via External Beam Radiation - EBRT.         REASON FOR VISIT: Metastatic squamous cell of the skin  HISTORY OF PRESENT ILLNESS:   78 y.o.  male presents today for follow-up of metastatic squamous cell of the skin.  Completed chemotherapy with cisplatin and radiation and early February 2021.  He is having some fatigue.  He follows with Dr. Patten and Dr. Francois.  Clinically doing reasonably well.  Recently underwent an exam and scans which are clear.    Past medical history, social history and family history was reviewed and unchanged from prior visit.    Review of Systems:    Review of Systems   Constitutional:  Positive for fatigue.   HENT:  Negative.     Eyes: Negative.    Respiratory: Negative.     Cardiovascular: Negative.    Gastrointestinal: Negative.    Endocrine: Negative.   "  Genitourinary: Negative.     Musculoskeletal: Negative.    Skin:  Positive for wound.   Neurological: Negative.    Hematological: Negative.    Psychiatric/Behavioral: Negative.              Medications:        Current Outpatient Medications:   •  apixaban (ELIQUIS) 5 MG tablet tablet, Take 1 tablet by mouth., Disp: , Rfl:   •  bicalutamide (CASODEX) 50 MG tablet, Take 1 tablet by mouth Daily., Disp: , Rfl:   •  Cholecalciferol (VITAMIN D-3 PO), Take 1 tablet by mouth Daily., Disp: , Rfl:   •  coenzyme Q10 100 MG capsule, Take 1 capsule by mouth Daily., Disp: , Rfl:   •  lisinopril (PRINIVIL,ZESTRIL) 10 MG tablet, Take 1 tablet by mouth Daily., Disp: , Rfl:   •  LORATADINE PO, Take 1 tablet by mouth Daily., Disp: , Rfl:   •  multivitamin with minerals tablet tablet, Take 1 tablet by mouth Daily., Disp: , Rfl:   •  simvastatin (ZOCOR) 40 MG tablet, Take 1 tablet by mouth Every Night., Disp: , Rfl:              ALLERGIES:    Allergies   Allergen Reactions   • Penicillins Rash         Physical Exam    VITAL SIGNS:  /81 Comment: LUE  Pulse 80   Temp 97.7 °F (36.5 °C) (Infrared)   Resp 20   Ht 170.2 cm (67\")   Wt 92.1 kg (203 lb)   SpO2 99% Comment: RA  BMI 31.79 kg/m²     Wt Readings from Last 3 Encounters:   11/20/23 92.1 kg (203 lb)   11/21/22 90.7 kg (200 lb)   10/17/22 90.7 kg (199 lb 14.4 oz)       Body mass index is 31.79 kg/m². Body surface area is 2.04 meters squared.    Pain Score    11/20/23 1014   PainSc: 0-No pain         Performance Status: 0    General: well appearing, in no acute distress  HEENT: sclera anicteric, neck is supple  Lymphatics: no cervical, supraclavicular, or axillary adenopathy  Cardiovascular: regular rate and rhythm, no murmurs, rubs or gallops  Lungs: clear to auscultation bilaterally  Abdomen: soft, nontender, nondistended.  No palpable organomegaly  Extremities: no lower extremity edema  Skin: no rashes, lesions, bruising, or petechiae  Msk:  Shows no weakness of the " large muscle groups  Psych: Mood is stable        RECENT LABS:    Lab Results   Component Value Date    HGB 9.4 (L) 01/27/2021    HCT 27.9 (L) 01/27/2021    MCV 89.7 01/27/2021     (L) 01/27/2021    WBC 4.10 01/27/2021    NEUTROABS 3.10 01/27/2021    LYMPHSABS 0.60 (L) 01/27/2021    MONOSABS 0.40 01/27/2021       Lab Results   Component Value Date    GLUCOSE 112 (H) 01/27/2021    BUN 32 (H) 01/27/2021    CREATININE 1.90 (H) 10/06/2022     (L) 01/27/2021    K 4.3 01/27/2021    CL 99 01/27/2021    CO2 22.0 01/27/2021    CALCIUM 9.1 01/27/2021    PROTEINTOT 6.0 01/13/2021    ALBUMIN 3.60 01/13/2021    BILITOT 0.3 01/13/2021    ALKPHOS 80 01/13/2021    AST 16 01/13/2021    ALT 21 01/13/2021     CT Soft Tissue Neck With Contrast    Result Date: 5/11/2022  Stable postoperative and posttreatment changes of the left neck, without evidence of recurrent soft tissue mass or pathologic cervical lymphadenopathy to suggest recurrence or metastatic involvement.  No evidence of metastatic or recurrent disease in the chest. No acute findings.  This report was finalized on 5/11/2022 12:15 PM by Nguyễn Milton.      CT Chest With Contrast Diagnostic    Result Date: 5/11/2022  Stable postoperative and posttreatment changes of the left neck, without evidence of recurrent soft tissue mass or pathologic cervical lymphadenopathy to suggest recurrence or metastatic involvement.  No evidence of metastatic or recurrent disease in the chest. No acute findings.  This report was finalized on 5/11/2022 12:15 PM by Nguyễn Milton.          Assessment/Plan    1.  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      He is now 3 years out from his diagnosis and clinically doing well.  At this point his risk of recurrence is fairly low.  He will continue to follow-up with Dr. Patten and Dr. Francois.  I will see him on an as-needed basis.              Jesus Manuel Viveros MD  [facility]  Hematology and Oncology    Return in (Approximately): PRN    No orders of the defined types were placed in this encounter.      11/20/2023

## 2023-11-20 NOTE — PROGRESS NOTES
FOLLOW UP NOTE    PATIENT:                                                      Ajith Gallego  MEDICAL RECORD #:                        [id number]  :                                                          1945  COMPLETION DATE:    2021  DIAGNOSIS:     Squamous cell carcinoma, ear, left  - Stage IV (cT1, cN2b(U), cM0)      BRIEF HISTORY: Mr. 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  He underwent superficial parotidectomy and lymph node dissection followed by course of adjuvant radiation therapy to [address] which she completed in 2001.  He is done very well following treatment.  He was last seen by Dr. Patten in September when he had a repeat laryngoscopy and exam that was normal.  His most recent CT scans were from 2022, and he is currently awaiting a repeat scan which is scheduled for January.  He reports a very good swallowing function and a good range of foods.  He denies any symptoms of pain.  His only complaint is related to mild tightness in the left neck.  He has no other complaints.    MEDICATIONS: Medication reconciliation for the patient was reviewed and confirmed in the electronic medical record.    Review of Systems   All other systems reviewed and are negative.      Karnofsky score: 80     Physical Exam  Vitals and nursing note reviewed.   Constitutional:       General: He is not in acute distress.     Appearance: He is well-developed.   HENT:      Head: Normocephalic and atraumatic.      Comments: No facial droop  Eyes:      Conjunctiva/sclera: Conjunctivae normal.      Pupils: Pupils are equal, round, and reactive to light.   Neck:      Comments: Postsurgical changes in the left neck consistent with prior parotidectomy and lymph node dissection.  Tightening of the left sternocleidomastoid muscle.  No dominant nodules, masses,  or adenopathy.  Cardiovascular:      Rate and Rhythm: Normal rate and regular rhythm.      Heart sounds: No murmur heard.     No friction rub.   Pulmonary:      Effort: Pulmonary effort is normal.      Breath sounds: Normal breath sounds. No wheezing.   Abdominal:      General: Bowel sounds are normal. There is no distension.      Palpations: Abdomen is soft. There is no mass.      Tenderness: There is no abdominal tenderness.   Musculoskeletal:         General: Normal range of motion.      Cervical back: Normal range of motion and neck supple.   Lymphadenopathy:      Cervical: No cervical adenopathy.   Skin:     General: Skin is warm and dry.   Neurological:      Mental Status: He is alert and oriented to person, place, and time.   Psychiatric:         Behavior: Behavior normal.         Thought Content: Thought content normal.         Judgment: Judgment normal.         VITAL SIGNS:   Vitals:    11/20/23 1046   BP: 133/59   Pulse: 84   Resp: 18   Temp: 96.2 °F (35.7 °C)   TempSrc: Temporal   SpO2: 94%   Weight: 92.3 kg (203 lb 6.4 oz)             Karnofsky score: 80         The following portions of the patient's history were reviewed and updated as appropriate: allergies, current medications, past family history, past medical history, past social history, past surgical history and problem list.         Diagnoses and all orders for this visit:    1. Squamous cell carcinoma, ear, left (Primary)         IMPRESSION: Mr. Gallego is a 78-year-old gentleman with a history of a recurrent cutaneous squamous cell carcinoma of the left ear and neck.  He is approaching 3 years out from completion of radiation therapy and clinically, he is doing exceptionally well without evidence of recurrent disease.  He is already being followed closely in both the Ear, Nose, and Throat clinic as well as Dermatology.  I will plan to see him back on an as-needed basis.    RECOMMENDATIONS: Follow-up care per ENT and dermatology    I spent a  total of 30 minutes on todays visit, with more than 20 minutes in direct face to face communication, and the remainder of the time spent in reviewing the relevant history, records, available imaging, and for documentation.    Return Folow up per ENT and Dermatology.    Juan Francisco Francois MD

## 2023-11-20 NOTE — PROGRESS NOTES
PROBLEM LIST:  Oncology/Hematology History   Squamous cell carcinoma, ear, left   11/18/2020 Initial Diagnosis    Squamous cell carcinoma, ear, left     11/19/2020 Cancer Staged    Staging form: Cutaneous Carcinoma Of The Head And Neck, AJCC 8th Edition  - Clinical stage from 11/19/2020: Stage IV (cT1, cN2b(U), cM0) - Signed by Juan Francisco Francois MD on 12/3/2020     12/18/2020 - 1/27/2021 Chemotherapy    OP Squamous of the Skin CISplatin (weekly) + XRT     12/21/2020 - 2/5/2021 Radiation    Radiation OncologyTreatment Course:  Ajith Gallego received 6600 cGy in 33 fractions to left neck via External Beam Radiation - EBRT.         REASON FOR VISIT: Metastatic squamous cell of the skin  HISTORY OF PRESENT ILLNESS:   78 y.o.  male presents today for follow-up of metastatic squamous cell of the skin.  Completed chemotherapy with cisplatin and radiation and early February 2021.  He is having some fatigue.  He follows with Dr. Patten and Dr. Francois.  Clinically doing reasonably well.  Recently underwent an exam and scans which are clear.    Past medical history, social history and family history was reviewed and unchanged from prior visit.    Review of Systems:    Review of Systems   Constitutional:  Positive for fatigue.   HENT:  Negative.     Eyes: Negative.    Respiratory: Negative.     Cardiovascular: Negative.    Gastrointestinal: Negative.    Endocrine: Negative.    Genitourinary: Negative.     Musculoskeletal: Negative.    Skin:  Positive for wound.   Neurological: Negative.    Hematological: Negative.    Psychiatric/Behavioral: Negative.              Medications:        Current Outpatient Medications:     apixaban (ELIQUIS) 5 MG tablet tablet, Take 1 tablet by mouth., Disp: , Rfl:     bicalutamide (CASODEX) 50 MG tablet, Take 1 tablet by mouth Daily., Disp: , Rfl:     Cholecalciferol (VITAMIN D-3 PO), Take 1 tablet by mouth Daily., Disp: , Rfl:     coenzyme Q10 100 MG capsule, Take 1 capsule by  "mouth Daily., Disp: , Rfl:     lisinopril (PRINIVIL,ZESTRIL) 10 MG tablet, Take 1 tablet by mouth Daily., Disp: , Rfl:     LORATADINE PO, Take 1 tablet by mouth Daily., Disp: , Rfl:     multivitamin with minerals tablet tablet, Take 1 tablet by mouth Daily., Disp: , Rfl:     simvastatin (ZOCOR) 40 MG tablet, Take 1 tablet by mouth Every Night., Disp: , Rfl:              ALLERGIES:    Allergies   Allergen Reactions    Penicillins Rash         Physical Exam    VITAL SIGNS:  /81 Comment: LUE  Pulse 80   Temp 97.7 °F (36.5 °C) (Infrared)   Resp 20   Ht 170.2 cm (67\")   Wt 92.1 kg (203 lb)   SpO2 99% Comment: RA  BMI 31.79 kg/m²     Wt Readings from Last 3 Encounters:   11/20/23 92.1 kg (203 lb)   11/21/22 90.7 kg (200 lb)   10/17/22 90.7 kg (199 lb 14.4 oz)       Body mass index is 31.79 kg/m². Body surface area is 2.04 meters squared.    Pain Score    11/20/23 1014   PainSc: 0-No pain         Performance Status: 0    General: well appearing, in no acute distress  HEENT: sclera anicteric, neck is supple  Lymphatics: no cervical, supraclavicular, or axillary adenopathy  Cardiovascular: regular rate and rhythm, no murmurs, rubs or gallops  Lungs: clear to auscultation bilaterally  Abdomen: soft, nontender, nondistended.  No palpable organomegaly  Extremities: no lower extremity edema  Skin: no rashes, lesions, bruising, or petechiae  Msk:  Shows no weakness of the large muscle groups  Psych: Mood is stable        RECENT LABS:    Lab Results   Component Value Date    HGB 9.4 (L) 01/27/2021    HCT 27.9 (L) 01/27/2021    MCV 89.7 01/27/2021     (L) 01/27/2021    WBC 4.10 01/27/2021    NEUTROABS 3.10 01/27/2021    LYMPHSABS 0.60 (L) 01/27/2021    MONOSABS 0.40 01/27/2021       Lab Results   Component Value Date    GLUCOSE 112 (H) 01/27/2021    BUN 32 (H) 01/27/2021    CREATININE 1.90 (H) 10/06/2022     (L) 01/27/2021    K 4.3 01/27/2021    CL 99 01/27/2021    CO2 22.0 01/27/2021    CALCIUM 9.1 " 01/27/2021    PROTEINTOT 6.0 01/13/2021    ALBUMIN 3.60 01/13/2021    BILITOT 0.3 01/13/2021    ALKPHOS 80 01/13/2021    AST 16 01/13/2021    ALT 21 01/13/2021     CT Soft Tissue Neck With Contrast    Result Date: 5/11/2022  Stable postoperative and posttreatment changes of the left neck, without evidence of recurrent soft tissue mass or pathologic cervical lymphadenopathy to suggest recurrence or metastatic involvement.  No evidence of metastatic or recurrent disease in the chest. No acute findings.  This report was finalized on 5/11/2022 12:15 PM by Nguyễn Milton.      CT Chest With Contrast Diagnostic    Result Date: 5/11/2022  Stable postoperative and posttreatment changes of the left neck, without evidence of recurrent soft tissue mass or pathologic cervical lymphadenopathy to suggest recurrence or metastatic involvement.  No evidence of metastatic or recurrent disease in the chest. No acute findings.  This report was finalized on 5/11/2022 12:15 PM by Nguyễn Milton.          Assessment/Plan    1.  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      He is now 3 years out from his diagnosis and clinically doing well.  At this point his risk of recurrence is fairly low.  He will continue to follow-up with Dr. Patten and Dr. Francois.  I will see him on an as-needed basis.              Jesus Manuel Viveros MD  [facility] Hematology and Oncology    Return in (Approximately): PRN    No orders of the defined types were placed in this encounter.      11/20/2023

## (undated) DEVICE — CATHETER,FOLEY,SILI-ELAST,LTX,14FR,10ML: Brand: MEDLINE

## (undated) DEVICE — NASOGASTRIC FEEDING TUBE,5 G WEIGHTED TIP, RIGID PORT, STYLET: Brand: KANGAROO

## (undated) DEVICE — ANTIBACTERIAL UNDYED BRAIDED (POLYGLACTIN 910), SYNTHETIC ABSORBABLE SUTURE: Brand: COATED VICRYL

## (undated) DEVICE — PREMIUM DRY TRAY LF: Brand: MEDLINE INDUSTRIES, INC.

## (undated) DEVICE — SUT GUT CHRM 3/0 PS2 27IN 1638H

## (undated) DEVICE — SUT ETHLN 4/0 PC3 18IN 1864G

## (undated) DEVICE — ELECTRODE 8227411 PAIRED 4 CH SET ROHS

## (undated) DEVICE — DISPOSABLE BIPOLAR FORCEPS 7 3/4" (19.7CM) SCOVILLE BAYONET, INSULATED, 1.5MM TIP AND 12 FT. (3.6M) CABLE: Brand: KIRWAN

## (undated) DEVICE — HARMONIC FOCUS SHEARS 9CM LENGTH + ADAPTIVE TISSUE TECHNOLOGY FOR USE WITH BLUE HAND PIECE ONLY: Brand: HARMONIC FOCUS

## (undated) DEVICE — DRSNG TELFA PAD NONADH STR 1S 3X8IN

## (undated) DEVICE — RETR STAY HK ELAS SHRP 5MM 50PK

## (undated) DEVICE — TOTAL TRAY, 16FR 10ML SIL FOLEY, URN: Brand: MEDLINE

## (undated) DEVICE — TP PLSTC CLR TRNSPORE 1IN SURG

## (undated) DEVICE — INTENDED FOR TISSUE SEPARATION, AND OTHER PROCEDURES THAT REQUIRE A SHARP SURGICAL BLADE TO PUNCTURE OR CUT.: Brand: BARD-PARKER ® STAINLESS STEEL BLADES

## (undated) DEVICE — BLAKE SILICONE DRAIN, 19 FR ROUND, HUBLESS: Brand: BLAKE

## (undated) DEVICE — PK MAJ ENT 10

## (undated) DEVICE — PROBE 8225101 5PK STD PRASS FL TIP ROHS

## (undated) DEVICE — IRRIGATOR BULB ASEPTO 60CC STRL

## (undated) DEVICE — PROXIMATE RH ROTATING HEAD SKIN STAPLERS (35 WIDE) CONTAINS 35 STAINLESS STEEL STAPLES: Brand: PROXIMATE

## (undated) DEVICE — CLTH CLENS READYCLEANSE PERI CARE PK/5

## (undated) DEVICE — GLV SURG SENSICARE PI MIC PF SZ7.5 LF STRL

## (undated) DEVICE — BOWL UTIL STRL 32OZ

## (undated) DEVICE — SUT GUT PLN 4/0 FS2 27IN H821H

## (undated) DEVICE — PATIENT RETURN ELECTRODE, SINGLE-USE, CONTACT QUALITY MONITORING, ADULT, WITH 9FT CORD, FOR PATIENTS WEIGING OVER 33LBS. (15KG): Brand: MEGADYNE

## (undated) DEVICE — SUT GUT PLN FAST ABS 5/0 PC1 18IN 1915G

## (undated) DEVICE — LP VESL MAXI 2.5X1MM BLU 2PK

## (undated) DEVICE — ELECTRD NDL EZ CLN MOD 2.75IN

## (undated) DEVICE — SUT SILK 3/0 TIES 18IN A184H

## (undated) DEVICE — JACKSON-PRATT 100CC BULB RESERVOIR: Brand: CARDINAL HEALTH

## (undated) DEVICE — SUT SILK 2/0 PS 18IN 1588H

## (undated) DEVICE — 3M™ MEDIPORE™ H SOFT CLOTH SURGICAL TAPE, 2863, 3 IN X 10 YD, 12/CASE: Brand: 3M™ MEDIPORE™

## (undated) DEVICE — SUT SILK 2/0 TIES 18IN A185H

## (undated) DEVICE — GLV SURG SENSICARE W/ALOE PF LF 6.5 STRL

## (undated) DEVICE — SUT VIC RAPD SZ4/0 18IN PC3 VR845

## (undated) DEVICE — GAUZE,SPONGE,4"X4",16PLY,XRAY,STRL,LF: Brand: MEDLINE

## (undated) DEVICE — SYR LL TP 10ML STRL

## (undated) DEVICE — SUT ETHLN 5/0 P3 18IN 698G

## (undated) DEVICE — CVR HNDL LIGHT RIGID

## (undated) DEVICE — 3M™ STERI-DRAPE™ INSTRUMENT POUCH 1018: Brand: STERI-DRAPE™